# Patient Record
Sex: FEMALE | Race: WHITE | NOT HISPANIC OR LATINO | Employment: STUDENT | URBAN - METROPOLITAN AREA
[De-identification: names, ages, dates, MRNs, and addresses within clinical notes are randomized per-mention and may not be internally consistent; named-entity substitution may affect disease eponyms.]

---

## 2017-01-30 ENCOUNTER — TELEPHONE (OUTPATIENT)
Dept: FAMILY MEDICINE | Facility: CLINIC | Age: 22
End: 2017-01-30

## 2017-01-30 NOTE — TELEPHONE ENCOUNTER
----- Message from Ngozi Freeman sent at 1/30/2017 11:29 AM CST -----  Contact: [phone]/ Jessica   Patient would like to be scheduled as a knew patient since her has passed the age that her pediatrician treats. Patient is on ADD medication and would like to know if you prescribe that medication. Please advise.

## 2017-01-30 NOTE — TELEPHONE ENCOUNTER
Called pt left message that Naila does prescribe ADHD medication but she would need your records from the physician that diagnose you with ADHD so we need a release from sign to get the records.

## 2017-01-30 NOTE — TELEPHONE ENCOUNTER
----- Message from Jeny Mcdaniels sent at 1/30/2017  1:15 PM CST -----  Contact: [phone]/ self   Pt would like to know if Sarah prescribes ADHD medication . Please advise

## 2017-01-31 NOTE — TELEPHONE ENCOUNTER
Called pt concerning her ADD medication ask pt to call office need a release form sign to get pt records on her ADHD from pass physician, and inform her that Naila does prescribe ADD medication.

## 2017-02-13 ENCOUNTER — TELEPHONE (OUTPATIENT)
Dept: FAMILY MEDICINE | Facility: CLINIC | Age: 22
End: 2017-02-13

## 2017-02-13 NOTE — TELEPHONE ENCOUNTER
Call patient and get records from Dr. Harris on ADHD and immunization records first and then advise patient we will call her to set up appointment once records reviewed.  Keep this call open so you remember to keep checking for the record.

## 2017-02-13 NOTE — TELEPHONE ENCOUNTER
----- Message from Sarika Garcia sent at 2/10/2017 12:43 PM CST -----  Contact: [phone]  Patient came in wanting an appointment to see Naila. Patient stated that she would be a New Patient for you. She would like to come in and be treated for her  ADHD, and to get a med refill on her medication for it  as well. Please call # [phone]. Thanks.

## 2017-02-15 NOTE — TELEPHONE ENCOUNTER
Call Dr. Harris's office and inform them that we are waiting on her record before I can schedule her appointment - could they please send ASAP

## 2017-02-16 NOTE — TELEPHONE ENCOUNTER
Called and was told should receive about Monday have not receive in there system yet.had to call   [phone].

## 2017-02-17 NOTE — TELEPHONE ENCOUNTER
They did not send me the physician progress notes to show evaluation and what medications have been prescribed - please resent request with the correct records.

## 2017-02-21 NOTE — TELEPHONE ENCOUNTER
Spoke with patient on phone.  She has been on Adderall, Adderall XR, Ritalin, Vyvanse and now Adzenys XR.  She reports that the Vyvanse worked best in past but felt that it was no longer effective so that was why they had changed medication to Adzenys XR but that one is not effective.    Advised patient that since her ADHD is uncontrolled despite multiple medication trials, I would recommend that she she a psych. Specialist.  Advised patient that there is a psych. NP at [facility] in [place].  States she will call there to set up appointment.

## 2017-02-22 ENCOUNTER — TELEPHONE (OUTPATIENT)
Dept: FAMILY MEDICINE | Facility: CLINIC | Age: 22
End: 2017-02-22

## 2017-02-22 NOTE — TELEPHONE ENCOUNTER
----- Message from Bruna Mora sent at 2/22/2017 11:13 AM CST -----  Patient's mother, Jessica, called.  No. [phone]   Have you received the records from Dr. Gomez.   Please call.

## 2017-02-22 NOTE — TELEPHONE ENCOUNTER
No answer.  Left message on voicemail that I have already spoken with the patient about her ADHD and records and gave recommendations.  Advised to call back with questions.

## 2017-02-23 ENCOUNTER — TELEPHONE (OUTPATIENT)
Dept: FAMILY MEDICINE | Facility: CLINIC | Age: 22
End: 2017-02-23

## 2017-02-23 NOTE — TELEPHONE ENCOUNTER
Spoke with pt mother want to know what doctor Naila referred her to. MA told pt  [facility].

## 2017-02-23 NOTE — TELEPHONE ENCOUNTER
----- Message from Ofelia Mccormick sent at 2/23/2017  8:08 AM CST -----  Contact: [phone]  Please call back with the name of doctor you were referring for my daugther/Please advise.

## 2017-03-08 ENCOUNTER — TELEPHONE (OUTPATIENT)
Dept: FAMILY MEDICINE | Facility: CLINIC | Age: 22
End: 2017-03-08

## 2017-03-08 NOTE — TELEPHONE ENCOUNTER
----- Message from Regina Zhu sent at 3/8/2017  3:50 PM CST -----  Contact: self/[phone]  She needs an appointment on a Friday for a physical, she will be a new patient.

## 2017-03-08 NOTE — TELEPHONE ENCOUNTER
Spoke with pt said that she spoke with Naila about staying on Vyvance, asked pt did she see NP at [facility] pt said no she wants to stay on Vyvance.pt wants to schedule a physical for Friday as a new pt.

## 2017-03-08 NOTE — TELEPHONE ENCOUNTER
May schedule as new patient - physical.  Advise patient that I am willing to put back on Vyvanse but if we continue to have problems controlling ADHD or if there are other underlying psych conditions such as anxiety - will refer to psych.

## 2017-03-10 ENCOUNTER — OFFICE VISIT (OUTPATIENT)
Dept: FAMILY MEDICINE | Facility: CLINIC | Age: 22
End: 2017-03-10
Payer: COMMERCIAL

## 2017-03-10 VITALS
TEMPERATURE: 98 F | HEIGHT: 65 IN | OXYGEN SATURATION: 99 % | WEIGHT: 133.63 LBS | SYSTOLIC BLOOD PRESSURE: 116 MMHG | HEART RATE: 75 BPM | BODY MASS INDEX: 22.26 KG/M2 | DIASTOLIC BLOOD PRESSURE: 70 MMHG

## 2017-03-10 DIAGNOSIS — L30.9 ECZEMA, UNSPECIFIED TYPE: ICD-10-CM

## 2017-03-10 DIAGNOSIS — L56.4 POLYMORPHOUS LIGHT ERUPTION: Primary | ICD-10-CM

## 2017-03-10 DIAGNOSIS — F90.2 ATTENTION DEFICIT HYPERACTIVITY DISORDER (ADHD), COMBINED TYPE: ICD-10-CM

## 2017-03-10 LAB
AMPHET+METHAMPHET UR QL: NORMAL
BARBITURATES UR QL SCN>200 NG/ML: NEGATIVE
BENZODIAZ UR QL SCN>200 NG/ML: NEGATIVE
BZE UR QL SCN: NEGATIVE
CANNABINOIDS UR QL SCN: NEGATIVE
CREAT UR-MCNC: 71 MG/DL
ETHANOL UR-MCNC: <10 MG/DL
METHADONE UR QL SCN>300 NG/ML: NEGATIVE
OPIATES UR QL SCN: NEGATIVE
PCP UR QL SCN>25 NG/ML: NEGATIVE
TOXICOLOGY INFORMATION: NORMAL

## 2017-03-10 PROCEDURE — 1160F RVW MEDS BY RX/DR IN RCRD: CPT | Mod: S$GLB,,, | Performed by: NURSE PRACTITIONER

## 2017-03-10 PROCEDURE — 80307 DRUG TEST PRSMV CHEM ANLYZR: CPT

## 2017-03-10 PROCEDURE — 99999 PR PBB SHADOW E&M-EST. PATIENT-LVL IV: CPT | Mod: PBBFAC,,, | Performed by: NURSE PRACTITIONER

## 2017-03-10 PROCEDURE — 99203 OFFICE O/P NEW LOW 30 MIN: CPT | Mod: S$GLB,,, | Performed by: NURSE PRACTITIONER

## 2017-03-10 PROCEDURE — 82570 ASSAY OF URINE CREATININE: CPT

## 2017-03-10 RX ORDER — LISDEXAMFETAMINE DIMESYLATE 50 MG/1
50 CAPSULE ORAL EVERY MORNING
Qty: 30 CAPSULE | Refills: 0 | Status: SHIPPED | OUTPATIENT
Start: 2017-03-10 | End: 2017-04-19 | Stop reason: SDUPTHER

## 2017-03-10 RX ORDER — PIMECROLIMUS 10 MG/G
CREAM TOPICAL
COMMUNITY
Start: 2017-01-23 | End: 2017-10-04 | Stop reason: ALTCHOICE

## 2017-03-10 RX ORDER — NAPROXEN 375 MG/1
375 TABLET ORAL
COMMUNITY
Start: 2016-08-31 | End: 2018-04-16

## 2017-03-10 RX ORDER — TRIAMCINOLONE ACETONIDE 1 MG/G
CREAM TOPICAL
COMMUNITY
Start: 2016-06-10 | End: 2017-10-04 | Stop reason: ALTCHOICE

## 2017-03-10 NOTE — MR AVS SNAPSHOT
[facility]  [address] [place] [address]  Phone: [phone]  Fax: [phone]                  Elisha Grimes   3/10/2017 2:00 PM   Office Visit    Description:  Female : 1995   Provider:  Naila Smith NP   Department:  [facility] [place]           Reason for Visit     Annual Exam     Medication Refill           Diagnoses this Visit        Comments    Polymorphous light eruption    -  Primary     Attention deficit hyperactivity disorder (ADHD), combined type         Eczema, unspecified type                To Do List           Goals (5 Years of Data)     None      Follow-Up and Disposition     Return in about 4 weeks (around 2017) for if doing well, wellness exam.  If complaint, schedule follow up.       These Medications        Disp Refills Start End    lisdexamfetamine (VYVANSE) 50 MG capsule 30 capsule 0 3/10/2017     Take 1 capsule (50 mg total) by mouth every morning. - Oral    Pharmacy: [facility] [place] - [address] Ph #: [phone]         [facility]     [facility] Nurse Care Line -  Assistance  Registered nurses in the [facility] Call Center provide clinical advisement, health education, appointment booking, and other advisory services.  Call for this free service at [phone].             Medications           Message regarding Medications     Verify the changes and/or additions to your medication regime listed below are the same as discussed with your clinician today.  If any of these changes or additions are incorrect, please notify your healthcare provider.        START taking these NEW medications        Refills    lisdexamfetamine (VYVANSE) 50 MG capsule 0    Sig: Take 1 capsule (50 mg total) by mouth every morning.    Class: Print    Route: Oral      STOP taking these medications     ranitidine (ZANTAC) 150 MG tablet Take 150 mg by mouth 2 (two) times daily.     "norethindrone-ethinyl estradiol (JUNEL FE 1/20) 1-20 mg-mcg per tablet Take 1 tablet by mouth once daily.           Verify that the below list of medications is an accurate representation of the medications you are currently taking.  If none reported, the list may be blank. If incorrect, please contact your healthcare provider. Carry this list with you in case of emergency.           Current Medications     naproxen (NAPROSYN) 375 MG tablet Take 375 mg by mouth.    norethindrone-e.estradiol-iron (LO LOESTRIN FE) 1 mg-10 mcg (24)/10 mcg (2) Tab TAKE 1 TABLET BY MOUTH DAILY    pimecrolimus (ELIDEL) 1 % cream Apply topically 2 (two) times daily.    triamcinolone acetonide 0.1% (KENALOG) 0.1 % cream Apply to areas of eczema on body twice a day    lisdexamfetamine (VYVANSE) 50 MG capsule Take 1 capsule (50 mg total) by mouth every morning.           Clinical Reference Information           Your Vitals Were     BP Pulse Temp Height Weight SpO2    116/70 (BP Location: Right arm, Patient Position: Sitting, BP Method: Manual) 75 98.2 °F (36.8 °C) (Oral) 5' 5" (1.651 m) 60.6 kg (133 lb 9.6 oz) 99%    BMI                22.23 kg/m2          Blood Pressure          Most Recent Value    BP  116/70      Allergies as of 3/10/2017     No Known Allergies      Immunizations Administered on Date of Encounter - 3/10/2017     None      Orders Placed During Today's Visit      Normal Orders This Visit    Toxicology screen, urine       [facility] Sign-Up     Activating your [facility] account is as easy as 1-2-3!     1) Visit [email or web address], select Sign Up Now, enter this activation code and your date of birth, then select Next.  [email or web address]  Expires: 4/24/2017  3:06 PM      2) Create a username and password to use when you visit [facility] in the future and select a security question in case you lose your password and select Next.    3) Enter your e-mail address and click Sign Up!    Additional Information  If you have questions, " please e-mail [email or web address] or call [phone] to talk to our [facility] staff. Remember, [facility] is NOT to be used for urgent needs. For medical emergencies, dial 911.         Language Assistance Services     ATTENTION: Language assistance services are available, free of charge. Please call [phone].      ATENCIÓN: Si habla español, tiene a david disposición servicios gratuitos de asistencia lingüística. Llame al [phone].     [facility] Ý: N?u b?n nói Ti?ng Vi?t, có các d?ch v? h? tr? ngôn ng? mi?n phí dành cho b?n. G?i s? [phone].         [facility] complies with applicable Federal civil rights laws and does not discriminate on the basis of race, color, national origin, age, disability, or sex.

## 2017-03-10 NOTE — PROGRESS NOTES
Subjective:       Patient ID: Elisha Grimes is a 21 y.o. female.    Chief Complaint: Annual Exam and Medication Refill    HPI Comments: ####NEW PATIENT###    Patient is here today to establish care with new PCP.    Patient has ADHD and has been tried on several different ADHD medications by her pediatrician.  She states that the Vyvanse seems to work the best with the least amount of side effects.  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    Patient also complains of a rash to bilateral arm and the v of her chest - has a sunburn that turned into rash/red bumps.      Previous Medications    NAPROXEN (NAPROSYN) 375 MG TABLET    Take 375 mg by mouth.    NORETHINDRONE-E.ESTRADIOL-IRON (LO LOESTRIN FE) 1 MG-10 MCG (24)/10 MCG (2) TAB    TAKE 1 TABLET BY MOUTH DAILY    PIMECROLIMUS (ELIDEL) 1 % CREAM    Apply topically 2 (two) times daily.    TRIAMCINOLONE ACETONIDE 0.1% (KENALOG) 0.1 % CREAM    Apply to areas of eczema on body twice a day       Past Medical History:   Diagnosis Date    Attention deficit hyperactivity disorder (ADHD), combined type     Eczema        History reviewed. No pertinent surgical history.    Family History   Problem Relation Age of Onset    No Known Problems Mother     Heart disease Father      passed age 63 Acute MI; had prior cardiac arrest events prior -     Hypertension Father     Hyperlipidemia Father     No Known Problems Brother     Emphysema Maternal Grandmother        Social History     Social History    Marital status: Single     Spouse name: N/A    Number of children: N/A    Years of education: N/A     Occupational History    student      Social History Main Topics    Smoking status: Never Smoker    Smokeless tobacco: None    Alcohol use Yes      Comment: once monthly on average; vodka and water usually 2 drinks on a night she drinks    Drug use: No    Sexual activity: Yes      "Partners: Male     Birth control/ protection: OCP     Other Topics Concern    None     Social History Narrative    Going to [employer] - with plans for PA school.       Review of Systems   Constitutional: Negative for appetite change, chills, fatigue, fever and unexpected weight change.   HENT: Negative for congestion, ear pain, mouth sores, nosebleeds, postnasal drip, rhinorrhea, sinus pressure, sneezing, sore throat, trouble swallowing and voice change.    Eyes: Negative for photophobia, pain, discharge, redness, itching and visual disturbance.   Respiratory: Negative for cough, chest tightness and shortness of breath.    Cardiovascular: Negative for chest pain, palpitations and leg swelling.   Gastrointestinal: Negative for abdominal pain, blood in stool, constipation, diarrhea, nausea and vomiting.   Genitourinary: Negative for dysuria, frequency, hematuria and urgency.   Musculoskeletal: Negative for arthralgias, back pain, joint swelling and myalgias.   Skin: Positive for rash. Negative for color change.   Allergic/Immunologic: Negative for immunocompromised state.   Neurological: Negative for dizziness, seizures, syncope, weakness and headaches.   Hematological: Negative for adenopathy. Does not bruise/bleed easily.   Psychiatric/Behavioral: Negative for agitation, dysphoric mood, sleep disturbance and suicidal ideas. The patient is not nervous/anxious.          Objective:     Vitals:    03/10/17 1402   BP: 116/70   BP Location: Right arm   Patient Position: Sitting   BP Method: Manual   Pulse: 75   Temp: 98.2 °F (36.8 °C)   TempSrc: Oral   SpO2: 99%   Weight: 60.6 kg (133 lb 9.6 oz)   Height: 5' 5" (1.651 m)          Physical Exam   Constitutional: She is oriented to person, place, and time. She appears well-developed and well-nourished.   HENT:   Head: Normocephalic and atraumatic.   Right Ear: External ear normal.   Left Ear: External ear normal.   Nose: Nose normal.   Mouth/Throat: " Oropharynx is clear and moist. No oropharyngeal exudate.   Eyes: EOM are normal. Pupils are equal, round, and reactive to light.   Neck: Normal range of motion. Neck supple. No tracheal deviation present. No thyromegaly present.   Cardiovascular: Normal rate, regular rhythm and normal heart sounds.    No murmur heard.  Pulmonary/Chest: Effort normal and breath sounds normal. No respiratory distress.   Abdominal: Soft. She exhibits no distension.   Musculoskeletal: Normal range of motion. She exhibits no edema.   Lymphadenopathy:     She has no cervical adenopathy.   Neurological: She is alert and oriented to person, place, and time. No cranial nerve deficit. Coordination normal.   Skin: Skin is warm and dry. Rash noted. Rash is papular.        Psychiatric: She has a normal mood and affect.         Assessment:         ICD-10-CM ICD-9-CM   1. Attention deficit hyperactivity disorder (ADHD), combined type F90.2 314.01   2. Eczema, unspecified type L30.9 692.9       Plan:       Polymorphous light eruption  -  Patient is already prescribed triamcinolone for eczema - advised to apply to area.  Advised on using sunscreens, etc.    Attention deficit hyperactivity disorder (ADHD), combined type  -  Start back on Vyvanse 50 mg daily and return in 4 weeks for follow up.  -     lisdexamfetamine (VYVANSE) 50 MG capsule; Take 1 capsule (50 mg total) by mouth every morning.  Dispense: 30 capsule; Refill: 0  -     Toxicology screen, urine    Eczema, unspecified type      Return in about 4 weeks (around 4/7/2017) for if doing well, wellness exam.  If complaint, schedule follow up.     Patient's Medications   New Prescriptions    LISDEXAMFETAMINE (VYVANSE) 50 MG CAPSULE    Take 1 capsule (50 mg total) by mouth every morning.   Previous Medications    NAPROXEN (NAPROSYN) 375 MG TABLET    Take 375 mg by mouth.    NORETHINDRONE-E.ESTRADIOL-IRON (LO LOESTRIN FE) 1 MG-10 MCG (24)/10 MCG (2) TAB    TAKE 1 TABLET BY MOUTH DAILY     PIMECROLIMUS (ELIDEL) 1 % CREAM    Apply topically 2 (two) times daily.    TRIAMCINOLONE ACETONIDE 0.1% (KENALOG) 0.1 % CREAM    Apply to areas of eczema on body twice a day   Modified Medications    No medications on file   Discontinued Medications    NAPROXEN (NAPROSYN) 500 MG TABLET    Take 500 mg by mouth 2 (two) times daily.    NORETHINDRONE-ETHINYL ESTRADIOL (JUNEL FE 1/20) 1-20 MG-MCG PER TABLET    Take 1 tablet by mouth once daily.    RANITIDINE (ZANTAC) 150 MG TABLET    Take 150 mg by mouth 2 (two) times daily.

## 2017-03-12 DIAGNOSIS — Z13.220 SCREENING CHOLESTEROL LEVEL: ICD-10-CM

## 2017-03-12 DIAGNOSIS — Z13.1 DIABETES MELLITUS SCREENING: ICD-10-CM

## 2017-03-12 DIAGNOSIS — Z13.0 SCREENING, ANEMIA, DEFICIENCY, IRON: Primary | ICD-10-CM

## 2017-03-12 DIAGNOSIS — Z13.29 THYROID DISORDER SCREEN: ICD-10-CM

## 2017-04-19 ENCOUNTER — OFFICE VISIT (OUTPATIENT)
Dept: FAMILY MEDICINE | Facility: CLINIC | Age: 22
End: 2017-04-19
Payer: COMMERCIAL

## 2017-04-19 VITALS
OXYGEN SATURATION: 99 % | BODY MASS INDEX: 22.92 KG/M2 | HEART RATE: 73 BPM | DIASTOLIC BLOOD PRESSURE: 60 MMHG | WEIGHT: 137.56 LBS | TEMPERATURE: 98 F | HEIGHT: 65 IN | SYSTOLIC BLOOD PRESSURE: 110 MMHG

## 2017-04-19 DIAGNOSIS — F90.2 ATTENTION DEFICIT HYPERACTIVITY DISORDER (ADHD), COMBINED TYPE: Primary | ICD-10-CM

## 2017-04-19 PROCEDURE — 1160F RVW MEDS BY RX/DR IN RCRD: CPT | Mod: S$GLB,,, | Performed by: NURSE PRACTITIONER

## 2017-04-19 PROCEDURE — 99999 PR PBB SHADOW E&M-EST. PATIENT-LVL III: CPT | Mod: PBBFAC,,, | Performed by: NURSE PRACTITIONER

## 2017-04-19 PROCEDURE — 99213 OFFICE O/P EST LOW 20 MIN: CPT | Mod: S$GLB,,, | Performed by: NURSE PRACTITIONER

## 2017-04-19 RX ORDER — LISDEXAMFETAMINE DIMESYLATE 50 MG/1
50 CAPSULE ORAL EVERY MORNING
Qty: 30 CAPSULE | Refills: 0 | Status: SHIPPED | OUTPATIENT
Start: 2017-04-19 | End: 2017-06-23 | Stop reason: SDUPTHER

## 2017-04-19 RX ORDER — LISDEXAMFETAMINE DIMESYLATE 50 MG/1
50 CAPSULE ORAL EVERY MORNING
Qty: 30 CAPSULE | Refills: 0 | Status: SHIPPED | OUTPATIENT
Start: 2017-05-19 | End: 2017-10-04 | Stop reason: SDUPTHER

## 2017-04-19 NOTE — MR AVS SNAPSHOT
[facility]  [address]  [place] [address]  Phone: [phone]  Fax: [phone]                  Elisha Grimes   2017 8:00 AM   Office Visit    Description:  Female : 1995   Provider:  Naila Smith NP   Department:  [facility]           Reason for Visit     Medication Refill           Diagnoses this Visit        Comments    Attention deficit hyperactivity disorder (ADHD), combined type    -  Primary            To Do List           Goals (5 Years of Data)     None      Follow-Up and Disposition     Return in about 2 months (around 2017).    Follow-up and Disposition History       These Medications        Disp Refills Start End    lisdexamfetamine (VYVANSE) 50 MG capsule 30 capsule 0 2017     Take 1 capsule (50 mg total) by mouth every morning. - Oral    [facility] [facility] [address] Ph #: [phone]       lisdexamfetamine (VYVANSE) 50 MG capsule 30 capsule 0 2017     Take 1 capsule (50 mg total) by mouth every morning. - Oral    Pharmacy: [place] [address] Ph #: [phone]         [facility]     [facility] Care Line -  Assistance  Unless otherwise directed by your provider, please contact [facility], our nurse care line that is available for  assistance.     Registered nurses in the [facility] Center provide: appointment scheduling, clinical advisement, health education, and other advisory services.  Call: [phone] (toll free)               Medications           Message regarding Medications     Verify the changes and/or additions to your medication regime listed below are the same as discussed with your clinician today.  If any of these changes or additions are incorrect, please notify your healthcare provider.        START taking these NEW medications        Refills     "lisdexamfetamine (VYVANSE) 50 MG capsule 0    Starting on: 5/19/2017    Sig: Take 1 capsule (50 mg total) by mouth every morning.    Class: Print    Route: Oral           Verify that the below list of medications is an accurate representation of the medications you are currently taking.  If none reported, the list may be blank. If incorrect, please contact your healthcare provider. Carry this list with you in case of emergency.           Current Medications     lisdexamfetamine (VYVANSE) 50 MG capsule Take 1 capsule (50 mg total) by mouth every morning.    norethindrone-e.estradiol-iron (LO LOESTRIN FE) 1 mg-10 mcg (24)/10 mcg (2) Tab TAKE 1 TABLET BY MOUTH DAILY    pimecrolimus (ELIDEL) 1 % cream Apply topically 2 (two) times daily.    triamcinolone acetonide 0.1% (KENALOG) 0.1 % cream Apply to areas of eczema on body twice a day    lisdexamfetamine (VYVANSE) 50 MG capsule Starting on May 19, 2017. Take 1 capsule (50 mg total) by mouth every morning.    naproxen (NAPROSYN) 375 MG tablet Take 375 mg by mouth.           Clinical Reference Information           Your Vitals Were     BP Pulse Temp Height Weight SpO2    110/60 (BP Location: Right arm, Patient Position: Sitting, BP Method: Automatic) 73 98.2 °F (36.8 °C) (Oral) 5' 5" (1.651 m) 62.4 kg (137 lb 9.1 oz) 99%    BMI                22.89 kg/m2          Blood Pressure          Most Recent Value    BP  110/60      Allergies as of 4/19/2017     No Known Allergies      Immunizations Administered on Date of Encounter - 4/19/2017     None      Language Assistance Services     ATTENTION: Language assistance services are available, free of charge. Please call [phone].      ATENCIÓN: Si selenala teodoro, tiene a david disposición servicios gratuitos de asistencia lingüística. Llame al [phone].     CHÚ Ý: N?u b?n nói Ti?ng Vi?t, có các d?ch v? h? tr? ngôn ng? mi?n phí dành cho b?n. G?i s? [phone].         [facility] complies with applicable " Federal civil rights laws and does not discriminate on the basis of race, color, national origin, age, disability, or sex.

## 2017-04-19 NOTE — PROGRESS NOTES
Subjective:       Patient ID: Elisha Grimes is a 21 y.o. female.    Chief Complaint: Medication Refill    HPI Comments: Patient has ADHD.  Patient was started back on Vyvanse after trial of several different medications by another provider and the Vyvanse is working the best.  No side effects reported.  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          Previous Medications    NAPROXEN (NAPROSYN) 375 MG TABLET    Take 375 mg by mouth.    NORETHINDRONE-E.ESTRADIOL-IRON (LO LOESTRIN FE) 1 MG-10 MCG (24)/10 MCG (2) TAB    TAKE 1 TABLET BY MOUTH DAILY    PIMECROLIMUS (ELIDEL) 1 % CREAM    Apply topically 2 (two) times daily.    TRIAMCINOLONE ACETONIDE 0.1% (KENALOG) 0.1 % CREAM    Apply to areas of eczema on body twice a day       Past Medical History:   Diagnosis Date    Attention deficit hyperactivity disorder (ADHD), combined type     Eczema        History reviewed. No pertinent surgical history.    Family History   Problem Relation Age of Onset    No Known Problems Mother     Heart disease Father      passed age 63 Acute MI; had prior cardiac arrest events prior -     Hypertension Father     Hyperlipidemia Father     No Known Problems Brother     Emphysema Maternal Grandmother        Social History     Social History    Marital status: Single     Spouse name: N/A    Number of children: N/A    Years of education: N/A     Occupational History    student      Social History Main Topics    Smoking status: Never Smoker    Smokeless tobacco: None    Alcohol use Yes      Comment: once monthly on average; vodka and water usually 2 drinks on a night she drinks    Drug use: No    Sexual activity: Yes     Partners: Male     Birth control/ protection: OCP     Other Topics Concern    None     Social History Narrative    Going to [employer] [place] - with plans for PA school.       Review of Systems  "  Constitutional: Negative for appetite change, chills, fatigue, fever and unexpected weight change.   HENT: Negative for congestion, ear pain, mouth sores, nosebleeds, postnasal drip, rhinorrhea, sinus pressure, sneezing, sore throat, trouble swallowing and voice change.    Eyes: Negative for photophobia, pain, discharge, redness, itching and visual disturbance.   Respiratory: Negative for cough, chest tightness and shortness of breath.    Cardiovascular: Negative for chest pain, palpitations and leg swelling.   Gastrointestinal: Negative for abdominal pain, blood in stool, constipation, diarrhea, nausea and vomiting.   Genitourinary: Negative for dysuria, frequency, hematuria and urgency.   Musculoskeletal: Negative for arthralgias, back pain, joint swelling and myalgias.   Skin: Negative for color change and rash.   Allergic/Immunologic: Negative for immunocompromised state.   Neurological: Negative for dizziness, seizures, syncope, weakness and headaches.   Hematological: Negative for adenopathy. Does not bruise/bleed easily.   Psychiatric/Behavioral: Negative for agitation, dysphoric mood, sleep disturbance and suicidal ideas. The patient is not nervous/anxious.          Objective:     Vitals:    04/19/17 0812   BP: 110/60   BP Location: Right arm   Patient Position: Sitting   BP Method: Automatic   Pulse: 73   Temp: 98.2 °F (36.8 °C)   TempSrc: Oral   SpO2: 99%   Weight: 62.4 kg (137 lb 9.1 oz)   Height: 5' 5" (1.651 m)          Physical Exam   Constitutional: She is oriented to person, place, and time. She appears well-developed and well-nourished.   HENT:   Head: Normocephalic and atraumatic.   Right Ear: External ear normal.   Left Ear: External ear normal.   Nose: Nose normal.   Mouth/Throat: Oropharynx is clear and moist. No oropharyngeal exudate.   Eyes: EOM are normal. Pupils are equal, round, and reactive to light.   Neck: Normal range of motion. Neck supple. No tracheal deviation present. No " thyromegaly present.   Cardiovascular: Normal rate, regular rhythm and normal heart sounds.    No murmur heard.  Pulmonary/Chest: Effort normal and breath sounds normal. No respiratory distress.   Abdominal: Soft. She exhibits no distension.   Musculoskeletal: Normal range of motion. She exhibits no edema.   Lymphadenopathy:     She has no cervical adenopathy.   Neurological: She is alert and oriented to person, place, and time. No cranial nerve deficit. Coordination normal.   Skin: Skin is warm and dry.   Psychiatric: She has a normal mood and affect.         Assessment:         ICD-10-CM ICD-9-CM   1. Attention deficit hyperactivity disorder (ADHD), combined type F90.2 314.01       Plan:       Attention deficit hyperactivity disorder (ADHD), combined type  -  Controlled on present medication.  Follow up in 2 months.  -     lisdexamfetamine (VYVANSE) 50 MG capsule; Take 1 capsule (50 mg total) by mouth every morning.  Dispense: 30 capsule; Refill: 0  -     lisdexamfetamine (VYVANSE) 50 MG capsule; Take 1 capsule (50 mg total) by mouth every morning.  Dispense: 30 capsule; Refill: 0      Return in about 2 months (around 6/19/2017).     Patient's Medications   New Prescriptions    LISDEXAMFETAMINE (VYVANSE) 50 MG CAPSULE    Take 1 capsule (50 mg total) by mouth every morning.   Previous Medications    NAPROXEN (NAPROSYN) 375 MG TABLET    Take 375 mg by mouth.    NORETHINDRONE-E.ESTRADIOL-IRON (LO LOESTRIN FE) 1 MG-10 MCG (24)/10 MCG (2) TAB    TAKE 1 TABLET BY MOUTH DAILY    PIMECROLIMUS (ELIDEL) 1 % CREAM    Apply topically 2 (two) times daily.    TRIAMCINOLONE ACETONIDE 0.1% (KENALOG) 0.1 % CREAM    Apply to areas of eczema on body twice a day   Modified Medications    Modified Medication Previous Medication    LISDEXAMFETAMINE (VYVANSE) 50 MG CAPSULE lisdexamfetamine (VYVANSE) 50 MG capsule       Take 1 capsule (50 mg total) by mouth every morning.    Take 1 capsule (50 mg total) by mouth every morning.    Discontinued Medications    No medications on file

## 2017-05-16 DIAGNOSIS — F90.2 ATTENTION DEFICIT HYPERACTIVITY DISORDER (ADHD), COMBINED TYPE: ICD-10-CM

## 2017-05-16 RX ORDER — LISDEXAMFETAMINE DIMESYLATE 50 MG/1
50 CAPSULE ORAL EVERY MORNING
Qty: 30 CAPSULE | Refills: 0 | OUTPATIENT
Start: 2017-05-16

## 2017-05-16 RX ORDER — LISDEXAMFETAMINE DIMESYLATE 50 MG/1
50 CAPSULE ORAL EVERY MORNING
Qty: 30 CAPSULE | Refills: 0 | OUTPATIENT
Start: 2017-05-19

## 2017-05-16 NOTE — TELEPHONE ENCOUNTER
Advise patient when I seen her at office, I gave her a prescription For April 2017 and also gave her a prescription for May 19th, 2017 - she should already have her prescription for this month and then we follow up in June.

## 2017-05-16 NOTE — TELEPHONE ENCOUNTER
----- Message from Rabia Ravi sent at 5/16/2017 12:21 PM CDT -----  Contact: [phone]  [facility]     Patient is requesting an  Order for vyvance

## 2017-05-17 RX ORDER — LISDEXAMFETAMINE DIMESYLATE 50 MG/1
50 CAPSULE ORAL EVERY MORNING
Qty: 30 CAPSULE | Refills: 0 | OUTPATIENT
Start: 2017-05-17

## 2017-05-17 NOTE — TELEPHONE ENCOUNTER
Spoke with pt mom and said the pt going out of town jese the prescription has the 19 th on it and the pharmacy said if physician would send an E-script thay would fill early but pt does have prescription for may just wanted it filled early, spoke with ZULEYKA Yoo and inform her of this matter and said this is a control substance can not be filled early or sent E scribe pt would have to fill when back in town.

## 2017-05-17 NOTE — TELEPHONE ENCOUNTER
----- Message from Bruna Mora sent at 5/17/2017  9:34 AM CDT -----  No. [phone]   Patient is going out of town today.   Vyvanse is not at the pharmacy yet.   [facility]

## 2017-05-17 NOTE — TELEPHONE ENCOUNTER
Advise patient that she was given two  prescriptions at her last visit - one was dated for April and one was dated for May - she was handed both prescriptions at office visit -  she has the prescription.

## 2017-06-23 ENCOUNTER — OFFICE VISIT (OUTPATIENT)
Dept: FAMILY MEDICINE | Facility: CLINIC | Age: 22
End: 2017-06-23
Payer: COMMERCIAL

## 2017-06-23 VITALS
HEART RATE: 88 BPM | HEIGHT: 65 IN | TEMPERATURE: 99 F | DIASTOLIC BLOOD PRESSURE: 70 MMHG | SYSTOLIC BLOOD PRESSURE: 108 MMHG | BODY MASS INDEX: 22.3 KG/M2 | WEIGHT: 133.81 LBS | OXYGEN SATURATION: 98 %

## 2017-06-23 DIAGNOSIS — F90.2 ATTENTION DEFICIT HYPERACTIVITY DISORDER (ADHD), COMBINED TYPE: Primary | ICD-10-CM

## 2017-06-23 DIAGNOSIS — L60.0 INGROWN TOENAIL: ICD-10-CM

## 2017-06-23 PROCEDURE — 99213 OFFICE O/P EST LOW 20 MIN: CPT | Mod: S$GLB,,, | Performed by: NURSE PRACTITIONER

## 2017-06-23 PROCEDURE — 99999 PR PBB SHADOW E&M-EST. PATIENT-LVL V: CPT | Mod: PBBFAC,,, | Performed by: NURSE PRACTITIONER

## 2017-06-23 RX ORDER — LISDEXAMFETAMINE DIMESYLATE 50 MG/1
50 CAPSULE ORAL EVERY MORNING
Qty: 30 CAPSULE | Refills: 0 | Status: SHIPPED | OUTPATIENT
Start: 2017-07-21 | End: 2017-10-04 | Stop reason: SDUPTHER

## 2017-06-23 RX ORDER — LISDEXAMFETAMINE DIMESYLATE 50 MG/1
50 CAPSULE ORAL EVERY MORNING
Qty: 30 CAPSULE | Refills: 0 | Status: SHIPPED | OUTPATIENT
Start: 2017-06-23 | End: 2017-06-23 | Stop reason: SDUPTHER

## 2017-06-23 RX ORDER — LISDEXAMFETAMINE DIMESYLATE 50 MG/1
50 CAPSULE ORAL EVERY MORNING
Qty: 30 CAPSULE | Refills: 0 | Status: SHIPPED | OUTPATIENT
Start: 2017-08-21 | End: 2017-10-04 | Stop reason: SDUPTHER

## 2017-06-23 NOTE — PATIENT INSTRUCTIONS
Understanding Ingrown Toenails    An ingrown nail is the result of a nail growing into the skin that surrounds it. This often occurs at either edge of the big toe. Ingrown nails may be caused by improper trimming, inherited nail deformities, injuries, fungal infections, or pressure.  Symptoms  Ingrown nails may cause pain at the tip of the toe or all the way to the base of the toe. The pain is often worse while walking. An ingrown nail may also lead to infection, inflammation, or a more serious condition. If its infected, you might see pus or redness.  Evaluation  To determine the extent of your problem, your healthcare provider examines and possibly presses the painful area. If other problems are suspected, blood tests, cultures, and X-rays may be done as well.  Treatment  If the nail isnt infected, your healthcare provider may trim the corner of it to help relieve your symptoms. He or she may need to remove one side of your nail back to the cuticle. The base of the nail may then be treated with a chemical to keep the ingrown part from growing back. Severe infections or ingrown nails may require antibiotics and temporary or permanent removal of a portion of the nail. To prevent pain, a local anesthetic may be used in these procedures. This treatment is usually done at your healthcare provider's office.  Prevention  Many nail problems can be prevented by wearing the right shoes and trimming your nails properly. To help avoid infection, keep your feet clean and dry. If you have diabetes, talk with your healthcare provider before doing any foot self-care.  · The right shoes: Get your feet measured (your size may change as you age). Wear shoes that are supportive and roomy enough for your toes to wiggle. Look for shoes made of natural materials such as leather, which allow your feet to breathe.  · Proper trimming: To avoid problems, trim your toenails straight across without cutting down into the corners. If you  cant trim your own nails, ask your healthcare provider to do so for you.  Date Last Reviewed: 10/1/2016  © 3091-4984 [employer]. [address] [place] [address], [place] [address]. All rights reserved. This information is not intended as a substitute for professional medical care. Always follow your healthcare professional's instructions.        Ingrown Toenail, Not Infected (Home Treatment)  An ingrown toenail occurs when the nail grows sideways into the skin next to the nail. This can cause pain, especially when wearing tight shoes. It can also lead to an infection with redness, swelling, and pus drainage. Most people respond to the treatments described here. Sometimes surgery is needed, however.  Home care  The following guidelines will help you care for your toenail at home:  · Soak the painful toe in warm water twice a day for 10 to 20 minutes each time. Wash the entire foot with an antibacterial soap.  · If there is redness or swelling around the toenail, apply an antibiotic ointment three times a day.  · Insert a small piece of rolled-up cotton under the corner of the nail to promote growth of the nail outward, away from the cuticle.  · Wear shoes that do not put pressure on the toes, such as a sandal or open shoe. Closed shoes should be big enough in the toes so that there is no pressure on the painful toe.  · You may use acetaminophen or ibuprofen for pain, unless another pain medicine was prescribed. Talk with your healthcare provider before using these medicines if you have chronic liver or kidney disease. Also tell your healthcare provider if you have ever had a stomach ulcer or GI bleeding.  Prevention  The following tips will help you prevent ingrown toenails:  · Avoid pointed, tight, or narrow shoes.  · Trim toenails once a month so they dont grow too long. Cut the nail straight across.  Follow-up care  Follow up with your healthcare provider or as advised.  When to seek medical advice  Call  your health care provider right away if any of these occur:  · Increasing redness, pain, or swelling of the toe  · Tender red streaks in the skin leading toward the ankle  · Pus or fluid drainage from the toe  · Fever of 100.4°F (38°C) or higher  Date Last Reviewed: 5/14/2015  © 9211-5813 [employer]. [address] [place] [address]. All rights reserved. This information is not intended as a substitute for professional medical care. Always follow your healthcare professional's instructions.

## 2017-06-23 NOTE — PROGRESS NOTES
Subjective:       Patient ID: Elisha Grimes is a 21 y.o. female.    Chief Complaint: No chief complaint on file.    Patient has ADHD.  Patient was started back on Vyvanse after trial of several different medications by another provider and the Vyvanse is working the best.  No side effects reported.  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     Patient has a recurring ingrown toenail to left 2nd toenail with swelling present but no infection or drainage - had removed but recurs - requesting referral to podiatrist.        Previous Medications    LISDEXAMFETAMINE (VYVANSE) 50 MG CAPSULE    Take 1 capsule (50 mg total) by mouth every morning.    LISDEXAMFETAMINE (VYVANSE) 50 MG CAPSULE    Take 1 capsule (50 mg total) by mouth every morning.    NAPROXEN (NAPROSYN) 375 MG TABLET    Take 375 mg by mouth.    NORETHINDRONE-E.ESTRADIOL-IRON (LO LOESTRIN FE) 1 MG-10 MCG (24)/10 MCG (2) TAB    TAKE 1 TABLET BY MOUTH DAILY    PIMECROLIMUS (ELIDEL) 1 % CREAM    Apply topically 2 (two) times daily.    TRIAMCINOLONE ACETONIDE 0.1% (KENALOG) 0.1 % CREAM    Apply to areas of eczema on body twice a day       Past Medical History:   Diagnosis Date    Attention deficit hyperactivity disorder (ADHD), combined type     Eczema        No past surgical history on file.    Family History   Problem Relation Age of Onset    No Known Problems Mother     Heart disease Father      passed age 63 Acute MI; had prior cardiac arrest events prior -     Hypertension Father     Hyperlipidemia Father     No Known Problems Brother     Emphysema Maternal Grandmother        Social History     Social History    Marital status: Single     Spouse name: N/A    Number of children: N/A    Years of education: N/A     Occupational History    student      Social History Main Topics    Smoking status: Never Smoker    Smokeless tobacco: Not on file    Alcohol use  "Yes      Comment: once monthly on average; vodka and water usually 2 drinks on a night she drinks    Drug use: No    Sexual activity: Yes     Partners: Male     Birth control/ protection: OCP     Other Topics Concern    Not on file     Social History Narrative    Going to [employer] - with plans for PA school.       Review of Systems   Constitutional: Negative for appetite change, chills, fatigue, fever and unexpected weight change.   HENT: Negative for congestion, ear pain, mouth sores, nosebleeds, postnasal drip, rhinorrhea, sinus pressure, sneezing, sore throat, trouble swallowing and voice change.    Eyes: Negative for photophobia, pain, discharge, redness, itching and visual disturbance.   Respiratory: Negative for cough, chest tightness and shortness of breath.    Cardiovascular: Negative for chest pain, palpitations and leg swelling.   Gastrointestinal: Negative for abdominal pain, blood in stool, constipation, diarrhea, nausea and vomiting.   Genitourinary: Negative for dysuria, frequency, hematuria and urgency.   Musculoskeletal: Negative for arthralgias, back pain, joint swelling and myalgias.   Skin: Negative for color change and rash.        Ingrown toenail   Allergic/Immunologic: Negative for immunocompromised state.   Neurological: Negative for dizziness, seizures, syncope, weakness and headaches.   Hematological: Negative for adenopathy. Does not bruise/bleed easily.   Psychiatric/Behavioral: Negative for agitation, dysphoric mood, sleep disturbance and suicidal ideas. The patient is not nervous/anxious.          Objective:     Vitals:    06/23/17 1454   BP: 108/70   BP Location: Left arm   Patient Position: Sitting   BP Method: Manual   Pulse: 88   Temp: 98.6 °F (37 °C)   TempSrc: Oral   SpO2: 98%   Weight: 60.7 kg (133 lb 13.1 oz)   Height: 5' 5" (1.651 m)          Physical Exam   Constitutional: She is oriented to person, place, and time. She appears well-developed and " well-nourished.   HENT:   Head: Normocephalic and atraumatic.   Right Ear: External ear normal.   Left Ear: External ear normal.   Nose: Nose normal.   Mouth/Throat: Oropharynx is clear and moist. No oropharyngeal exudate.   Eyes: EOM are normal. Pupils are equal, round, and reactive to light.   Neck: Normal range of motion. Neck supple. No tracheal deviation present. No thyromegaly present.   Cardiovascular: Normal rate, regular rhythm and normal heart sounds.    No murmur heard.  Pulmonary/Chest: Effort normal and breath sounds normal. No respiratory distress.   Abdominal: Soft. She exhibits no distension.   Musculoskeletal: Normal range of motion. She exhibits no edema.        Feet:    + ingrown toenail to left 2nd toenail - no infection   Lymphadenopathy:     She has no cervical adenopathy.   Neurological: She is alert and oriented to person, place, and time. No cranial nerve deficit. Coordination normal.   Skin: Skin is warm and dry.   Psychiatric: She has a normal mood and affect.         Assessment:         ICD-10-CM ICD-9-CM   1. Attention deficit hyperactivity disorder (ADHD), combined type F90.2 314.01   2. Ingrown toenail L60.0 703.0       Plan:       Attention deficit hyperactivity disorder (ADHD), combined type  -  Follow up in 3 months.  -     lisdexamfetamine (VYVANSE) 50 MG capsule; Take 1 capsule (50 mg total) by mouth every morning.  Dispense: 30 capsule; Refill: 0  -     lisdexamfetamine (VYVANSE) 50 MG capsule; Take 1 capsule (50 mg total) by mouth every morning.  Dispense: 30 capsule; Refill: 0  -     lisdexamfetamine (VYVANSE) 50 MG capsule; Take 1 capsule (50 mg total) by mouth every morning.  Dispense: 30 capsule; Refill: 0    Ingrown toenail  -     Ambulatory Referral to Podiatry      Return in about 3 months (around 9/23/2017).     Patient's Medications   New Prescriptions    LISDEXAMFETAMINE (VYVANSE) 50 MG CAPSULE    Take 1 capsule (50 mg total) by mouth every morning.   Previous  Medications    LISDEXAMFETAMINE (VYVANSE) 50 MG CAPSULE    Take 1 capsule (50 mg total) by mouth every morning.    NAPROXEN (NAPROSYN) 375 MG TABLET    Take 375 mg by mouth.    NORETHINDRONE-E.ESTRADIOL-IRON (LO LOESTRIN FE) 1 MG-10 MCG (24)/10 MCG (2) TAB    TAKE 1 TABLET BY MOUTH DAILY    PIMECROLIMUS (ELIDEL) 1 % CREAM    Apply topically 2 (two) times daily.    TRIAMCINOLONE ACETONIDE 0.1% (KENALOG) 0.1 % CREAM    Apply to areas of eczema on body twice a day   Modified Medications    Modified Medication Previous Medication    LISDEXAMFETAMINE (VYVANSE) 50 MG CAPSULE lisdexamfetamine (VYVANSE) 50 MG capsule       Take 1 capsule (50 mg total) by mouth every morning.    Take 1 capsule (50 mg total) by mouth every morning.   Discontinued Medications    No medications on file

## 2017-06-28 ENCOUNTER — HOSPITAL ENCOUNTER (OUTPATIENT)
Dept: RADIOLOGY | Facility: HOSPITAL | Age: 22
Discharge: HOME | End: 2017-06-28
Attending: NURSE PRACTITIONER
Payer: COMMERCIAL

## 2017-06-28 ENCOUNTER — OFFICE VISIT (OUTPATIENT)
Dept: FAMILY MEDICINE | Facility: CLINIC | Age: 22
End: 2017-06-28
Payer: COMMERCIAL

## 2017-06-28 VITALS
SYSTOLIC BLOOD PRESSURE: 130 MMHG | OXYGEN SATURATION: 99 % | WEIGHT: 133.81 LBS | TEMPERATURE: 98 F | BODY MASS INDEX: 22.3 KG/M2 | DIASTOLIC BLOOD PRESSURE: 80 MMHG | HEART RATE: 89 BPM | HEIGHT: 65 IN

## 2017-06-28 DIAGNOSIS — R10.9 ACUTE LEFT FLANK PAIN: ICD-10-CM

## 2017-06-28 DIAGNOSIS — R31.9 BLOOD IN URINE: ICD-10-CM

## 2017-06-28 DIAGNOSIS — R10.9 ACUTE LEFT FLANK PAIN: Primary | ICD-10-CM

## 2017-06-28 DIAGNOSIS — N39.0 URINARY TRACT INFECTION WITH HEMATURIA, SITE UNSPECIFIED: ICD-10-CM

## 2017-06-28 DIAGNOSIS — R30.0 DYSURIA: ICD-10-CM

## 2017-06-28 DIAGNOSIS — R31.9 URINARY TRACT INFECTION WITH HEMATURIA, SITE UNSPECIFIED: ICD-10-CM

## 2017-06-28 LAB
B-HCG UR QL: NEGATIVE
BILIRUB SERPL-MCNC: NEGATIVE MG/DL
BLOOD URINE, POC: ABNORMAL
COLOR, POC UA: ABNORMAL
CTP QC/QA: YES
GLUCOSE UR QL STRIP: NEGATIVE
KETONES UR QL STRIP: NEGATIVE
LEUKOCYTE ESTERASE URINE, POC: ABNORMAL
NITRITE, POC UA: NEGATIVE
PH, POC UA: 6.5
PROTEIN, POC: NEGATIVE
SPECIFIC GRAVITY, POC UA: 1
UROBILINOGEN, POC UA: 0.2

## 2017-06-28 PROCEDURE — 99999 PR PBB SHADOW E&M-EST. PATIENT-LVL V: CPT | Mod: PBBFAC,,, | Performed by: NURSE PRACTITIONER

## 2017-06-28 PROCEDURE — 99213 OFFICE O/P EST LOW 20 MIN: CPT | Mod: 25,S$GLB,, | Performed by: NURSE PRACTITIONER

## 2017-06-28 PROCEDURE — 74176 CT ABD & PELVIS W/O CONTRAST: CPT | Mod: 26,,, | Performed by: RADIOLOGY

## 2017-06-28 PROCEDURE — 74176 CT ABD & PELVIS W/O CONTRAST: CPT | Mod: TC

## 2017-06-28 PROCEDURE — 81001 URINALYSIS AUTO W/SCOPE: CPT | Mod: S$GLB,,, | Performed by: NURSE PRACTITIONER

## 2017-06-28 PROCEDURE — 81025 URINE PREGNANCY TEST: CPT | Mod: QW,S$GLB,, | Performed by: NURSE PRACTITIONER

## 2017-06-28 RX ORDER — KETOROLAC TROMETHAMINE 10 MG/1
10 TABLET, FILM COATED ORAL EVERY 6 HOURS
Qty: 30 TABLET | Refills: 0 | Status: SHIPPED | OUTPATIENT
Start: 2017-06-28 | End: 2017-10-04 | Stop reason: ALTCHOICE

## 2017-06-28 RX ORDER — SULFAMETHOXAZOLE AND TRIMETHOPRIM 800; 160 MG/1; MG/1
1 TABLET ORAL 2 TIMES DAILY
Qty: 20 TABLET | Refills: 0 | Status: SHIPPED | OUTPATIENT
Start: 2017-06-28 | End: 2017-10-04 | Stop reason: ALTCHOICE

## 2017-06-28 RX ORDER — TAMSULOSIN HYDROCHLORIDE 0.4 MG/1
0.4 CAPSULE ORAL DAILY
Qty: 30 CAPSULE | Refills: 0 | Status: SHIPPED | OUTPATIENT
Start: 2017-06-28 | End: 2017-10-04 | Stop reason: ALTCHOICE

## 2017-06-28 NOTE — PATIENT INSTRUCTIONS
Kidney Stone (with Pain)    The sharp cramping pain on either side of your lower back and nausea/vomiting that you have are because of a small stone that has formed in the kidney. It is now passing down a narrow tube (ureter) on its way to your bladder. Once the stone reaches your bladder, the pain will often stop. But it may come back as the stone continues to pass out of the bladder and through the urethra. The stone may pass in your urine stream in one piece. The size may be 1/16 inch to 1/4 inch (1 mm to 6 mm). Or, the stone may break up into pamella fragments that you may not even notice.  Once you have had a kidney stone, you are at risk of getting another one in the future. There are 4 types of kidney stones. Eighty percent are calcium stones--mostly calcium oxalate but also some with calcium phosphate. The other 3 types include uric acid stones, struvite stones (from a preceding infection), and rarely, cystine stones.  Most stones will pass on their own, but may take from a few hours to a few days. Sometimes the stone is too large to pass by itself. In that case, the healthcare provider will need to use other ways to remove the stone. These techniques include:  · Lithotripsy. This uses ultrasound waves to break up the stone.  · Ureteroscopy. This pushes a basket-like instrument through the urethra and bladder and into the ureter to pull out the stone.  · Various types of direct surgery through the skin  Home care  The following are general care guidelines:  · Drink plenty of fluids. This means at least 12, 8-ounce glasses of fluid--mostly water--a day.  · Each time you urinate, do so in a jar. Pour the urine from the jar through the strainer and into the toilet. Continue doing this until 24 hours after your pain stops. By then, if there was a kidney stone, it should pass from your bladder. Some stones dissolve into sand-like particles and pass right through the strainer. In that case, you wont ever see a  stone.  · Save any stone that you find in the strainer and bring it to your healthcare provider to look at. It may be possible to stop certain types of stones from forming. For this reason, it is important to know what kind of stone you have.  · Try to stay as active as possible. This will help the stone pass. Don't stay in bed unless your pain keeps you from getting up. You may notice a red, pink, or brown color to your urine. This is normal while passing a kidney stone.  · If you develop pain, you may take ibuprofen or naproxen for pain, unless another medicine was prescribed. If you have chronic liver or kidney disease, talk with your healthcare provider before taking these medicines. Also talk with your provider if you've had a stomach ulcer or GI bleeding.  Preventing stones  Each year for the next 5 to 7 years, you are at risk that a new stone will form. Your risk is a 50% chance over this time period. The risk is higher if you have a family history of kidney stones or have certain chronic illnesses like hypertension, obesity, or diabetes. But you can make changes to your lifestyle and diet that can lower your risk for another stone.  Most kidney stones are made of calcium. The following is advice for preventing another calcium stone. If you dont know the type of stone you have, follow this advice until the cause of your stone is found.  Things that help:  · The most important thing you can do is to drink plenty of fluids each day. See home care above.   · Eat foods that contain phytates. These include wheat, rice, rye, barley, and beans. Phytates are substances that may lower your risk for any type of stone to form.  · Eat more fruits and vegetables. Choose those that are high in potassium.  · Eat foods high in natural citrate like fruit and low-sugar fruit juices.  · Having too little calcium in your diet can put you at risk for calcium kidney stones. Eat a normal amount of calcium in your diet and  talk with your healthcare provider if you are taking calcium supplements. Cutting back on your calcium intake may raise your risk. New research shows that eating calcium-rich and oxalate-rich foods together lowers your risk for stones by binding the minerals in the stomach and intestines before they can reach the kidneys.    · Limit salt intake to 2 grams (1 teaspoon) per day. Use limited amounts when cooking, and dont add salt at the table. Processed and canned foods are usually high in salt.   · Spinach, rhubarb, peanuts, cashews, almonds, grapefruit, and grapefruit juice are all high oxalate foods. You should limit how much of these you eat. Or eat them with calcium-rich foods. These include dairy products, dark leafy greens, soy products, and calcium-enriched foods.  · Reducing the amount of animal meat and high protein foods in your diet may lower your risk for uric acid stones.  · Avoid excess sugar (sucrose) and fructose (sweetener in many soft drinks) in your diet.   · If you take vitamin C as a supplement, don't take more than 1,000 mg a day.  · A dietitian or your healthcare provider can give you information about changes in your diet that will help prevent more kidney stones from forming.  Follow-up care  Follow up with your healthcare provider, or as advised, if the pain lasts more than 48 hours. Talk with your provider about urine and blood tests to find out the cause of your stone. If you had an X-ray, CT scan, or other diagnostic test, you will be told of any new findings that may affect your care.  Call 911  Call 911 if you have any of these:  · Weakness, dizziness, or fainting  When to seek medical advice  Call your healthcare provider right away if any of these occur:  · Pain that is not controlled by the medicine given  · Repeated vomiting or unable to keep down fluids  · Fever of 100.4ºF (38ºC) or higher, or as directed by your healthcare provider  · Passage of solid red or brown urine (can't  see through it) or urine with lots of blood clots  · Foul-smelling or cloudy urine  · Unable to pass urine for 8 hours and increasing bladder pressure  Date Last Reviewed: 10/1/2016  © 5870-7466 [employer]. [address] [place] [address], [place] [address]. All rights reserved. This information is not intended as a substitute for professional medical care. Always follow your healthcare professional's instructions.

## 2017-06-28 NOTE — PROGRESS NOTES
Subjective:       Patient ID: Elisha Grimes is a 21 y.o. female.    Chief Complaint: Flank Pain (started this morning to side and lower abdomin,pt states hurts to sit,pt states urin has a loud smell) and Urinary Tract Infection    Patient is here today with ACUTE Left Flank Pain.    Patient is a 21 year old female with report that she started on Sunday, 6/25/17, with some burning on urination and frequency.  Reports she also had some vaginal burning.  States she did sit around in wet bathing suit all day Sunday at [place] so she treated with OTC AZO AND Monistat.  Patient reports the burning with urination continued and the urine now had a foul, strong odor.  She reports she then started with ACUTE left flank pain this morning - could not stay in college class - very painful flank pain and some mild pain to the left suprapubic area.  Denies any history of kidney stones.  Denies family history of kidney stones.  Patient is sexually active and on birth control - will check UA and Urine pregnancy and sent for CT stone study.      Flank Pain   This is a new problem. The current episode started today. The problem occurs constantly. The problem is unchanged. The pain is present in the costovertebral angle. Quality: acute sharp shooting/aching pain. The pain is at a severity of 8/10. The pain is severe. Associated symptoms include dysuria. Pertinent negatives include no abdominal pain, bladder incontinence, bowel incontinence, chest pain, fever, headaches or weakness. (Some mild left suprapubic pain; acute left flank pain) Risk factors: no history of kidney stones, denies chance of pregnancy. Treatments tried: AZO - no relief. The treatment provided no relief.   Urinary Tract Infection    This is a new problem. Episode onset: started on Sunday 6/25/17 with some dysuria/burning with urination. The problem occurs every urination. The problem has been gradually worsening. The quality of the pain is described as  burning. The pain is at a severity of 6/10. There has been no fever. She is sexually active. There is no history of pyelonephritis. Associated symptoms include flank pain, frequency and urgency. Pertinent negatives include no chills, nausea, vomiting, constipation or rash. Associated symptoms comments: Reports strong, foul odor to urine. Treatments tried: AZO. The treatment provided mild relief. There is no history of diabetes insipidus, diabetes mellitus, hypertension or kidney stones.       Previous Medications    LISDEXAMFETAMINE (VYVANSE) 50 MG CAPSULE    Take 1 capsule (50 mg total) by mouth every morning.    LISDEXAMFETAMINE (VYVANSE) 50 MG CAPSULE    Take 1 capsule (50 mg total) by mouth every morning.    LISDEXAMFETAMINE (VYVANSE) 50 MG CAPSULE    Take 1 capsule (50 mg total) by mouth every morning.    NAPROXEN (NAPROSYN) 375 MG TABLET    Take 375 mg by mouth.    NORETHINDRONE-E.ESTRADIOL-IRON (LO LOESTRIN FE) 1 MG-10 MCG (24)/10 MCG (2) TAB    TAKE 1 TABLET BY MOUTH DAILY    PIMECROLIMUS (ELIDEL) 1 % CREAM    Apply topically 2 (two) times daily.    TRIAMCINOLONE ACETONIDE 0.1% (KENALOG) 0.1 % CREAM    Apply to areas of eczema on body twice a day       Past Medical History:   Diagnosis Date    Attention deficit hyperactivity disorder (ADHD), combined type     Eczema        History reviewed. No pertinent surgical history.    Family History   Problem Relation Age of Onset    No Known Problems Mother     Heart disease Father      passed age 63 Acute MI; had prior cardiac arrest events prior -     Hypertension Father     Hyperlipidemia Father     No Known Problems Brother     Emphysema Maternal Grandmother        Social History     Social History    Marital status: Single     Spouse name: N/A    Number of children: N/A    Years of education: N/A     Occupational History    student      Social History Main Topics    Smoking status: Never Smoker    Smokeless tobacco: Never Used    Alcohol use Yes     "  Comment: once monthly on average; vodka and water usually 2 drinks on a night she drinks    Drug use: No    Sexual activity: Yes     Partners: Male     Birth control/ protection: OCP     Other Topics Concern    None     Social History Narrative    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       Review of Systems   Constitutional: Negative for appetite change, chills, fatigue, fever and unexpected weight change.   HENT: Negative for congestion, ear pain, mouth sores, nosebleeds, postnasal drip, rhinorrhea, sinus pressure, sneezing, sore throat, trouble swallowing and voice change.    Eyes: Negative for photophobia, pain, discharge, redness, itching and visual disturbance.   Respiratory: Negative for cough, chest tightness and shortness of breath.    Cardiovascular: Negative for chest pain, palpitations and leg swelling.   Gastrointestinal: Negative for abdominal pain, blood in stool, bowel incontinence, constipation, diarrhea, nausea and vomiting.   Genitourinary: Positive for dysuria, flank pain, frequency and urgency. Negative for bladder incontinence.   Musculoskeletal: Negative for arthralgias, back pain, joint swelling and myalgias.   Skin: Negative for color change and rash.   Allergic/Immunologic: Negative for immunocompromised state.   Neurological: Negative for dizziness, seizures, syncope, weakness and headaches.   Hematological: Negative for adenopathy. Does not bruise/bleed easily.   Psychiatric/Behavioral: Negative for agitation, dysphoric mood, sleep disturbance and suicidal ideas. The patient is not nervous/anxious.          Objective:     Vitals:    06/28/17 1435   BP: 130/80   BP Location: Right arm   Patient Position: Sitting   BP Method: Manual   Pulse: 89   Temp: 98.2 °F (36.8 °C)   TempSrc: Oral   SpO2: 99%   Weight: 60.7 kg (133 lb 12.8 oz)   Height: 5' 5" (1.651 m)          Physical Exam   Constitutional: She is oriented to person, place, and time. She appears " well-developed and well-nourished.   HENT:   Head: Normocephalic and atraumatic.   Right Ear: External ear normal.   Left Ear: External ear normal.   Nose: Nose normal.   Mouth/Throat: Oropharynx is clear and moist. No oropharyngeal exudate.   Eyes: EOM are normal. Pupils are equal, round, and reactive to light.   Neck: Normal range of motion. Neck supple. No tracheal deviation present. No thyromegaly present.   Cardiovascular: Normal rate, regular rhythm and normal heart sounds.    No murmur heard.  Pulmonary/Chest: Effort normal and breath sounds normal. No respiratory distress.   Abdominal: Soft. She exhibits no distension and no mass. There is tenderness in the suprapubic area. There is CVA tenderness.       + Molina's punch sign   Musculoskeletal: Normal range of motion. She exhibits no edema.        Arms:  + left flank pain.     Lymphadenopathy:     She has no cervical adenopathy.   Neurological: She is alert and oriented to person, place, and time. No cranial nerve deficit. Coordination normal.   Skin: Skin is warm and dry. No rash noted.   Psychiatric: She has a normal mood and affect.       Office Visit on 06/28/2017   Component Date Value Ref Range Status    Color, UA 06/28/2017 DK YELLOW   Final    Spec Grav UA 06/28/2017 1.005   Final    pH, UA 06/28/2017 6.5   Final    WBC, UA 06/28/2017 LARGE   Final    Nitrite, UA 06/28/2017 NEGATIVE   Final    Protein 06/28/2017 NEGATIVE   Final    Glucose, UA 06/28/2017 NEGATIVE   Final    Ketones, UA 06/28/2017 NEGATIVE   Final    Urobilinogen, UA 06/28/2017 0.2   Final    Bilirubin 06/28/2017 NEGATIVE   Final    Blood, UA 06/28/2017 MODERATE   Final    POC Preg Test, Ur 06/28/2017 Negative  Negative Final     Acceptable 06/28/2017 Yes   Final       Assessment:         ICD-10-CM ICD-9-CM   1. Acute left flank pain R10.9 789.09     338.19   2. Blood in urine R31.9 599.70   3. Dysuria R30.0 788.1   4. Urinary tract infection with  hematuria, site unspecified N39.0 599.0    R31.9        Plan:       Acute left flank pain  -  Patient sent straight to [facility] for CT stone study.  Advised patient that regardless of results of CT - needs to start Bactrim DS twice daily, Flomax daily and Toradol prn pain.  If no obstructing kidney stone, will continue antibiotic, flomax and toradol and will see if can pass stone.  If any obstruction present, will have to set patient up with urology for procedure.  Dr. Rosenthal, urologist, was in office today and reviewed patient's symptoms and POC.    -     CT Renal Stone Study ABD Pelvis WO; Future; Expected date: 06/28/2017  -     POCT urine pregnancy  -     tamsulosin (FLOMAX) 0.4 mg Cp24; Take 1 capsule (0.4 mg total) by mouth once daily.  Dispense: 30 capsule; Refill: 0  -     ketorolac (TORADOL) 10 mg tablet; Take 1 tablet (10 mg total) by mouth every 6 (six) hours.  Dispense: 30 tablet; Refill: 0    Blood in urine    Dysuria  -     POCT urinalysis, dipstick or tablet reag    Urinary tract infection with hematuria, site unspecified  -     Urine culture  -     sulfamethoxazole-trimethoprim 800-160mg (BACTRIM DS) 800-160 mg Tab; Take 1 tablet by mouth 2 (two) times daily.  Dispense: 20 tablet; Refill: 0      Return for pending CT stone study results; go straight to [facility] today for stone study.     Patient's Medications   New Prescriptions    KETOROLAC (TORADOL) 10 MG TABLET    Take 1 tablet (10 mg total) by mouth every 6 (six) hours.    SULFAMETHOXAZOLE-TRIMETHOPRIM 800-160MG (BACTRIM DS) 800-160 MG TAB    Take 1 tablet by mouth 2 (two) times daily.    TAMSULOSIN (FLOMAX) 0.4 MG CP24    Take 1 capsule (0.4 mg total) by mouth once daily.   Previous Medications    LISDEXAMFETAMINE (VYVANSE) 50 MG CAPSULE    Take 1 capsule (50 mg total) by mouth every morning.    LISDEXAMFETAMINE (VYVANSE) 50 MG CAPSULE    Take 1 capsule (50 mg total) by mouth every morning.    LISDEXAMFETAMINE (VYVANSE) 50 MG CAPSULE     Take 1 capsule (50 mg total) by mouth every morning.    NAPROXEN (NAPROSYN) 375 MG TABLET    Take 375 mg by mouth.    NORETHINDRONE-E.ESTRADIOL-IRON (LO LOESTRIN FE) 1 MG-10 MCG (24)/10 MCG (2) TAB    TAKE 1 TABLET BY MOUTH DAILY    PIMECROLIMUS (ELIDEL) 1 % CREAM    Apply topically 2 (two) times daily.    TRIAMCINOLONE ACETONIDE 0.1% (KENALOG) 0.1 % CREAM    Apply to areas of eczema on body twice a day   Modified Medications    No medications on file   Discontinued Medications    No medications on file

## 2017-06-29 ENCOUNTER — OFFICE VISIT (OUTPATIENT)
Dept: PODIATRY | Facility: CLINIC | Age: 22
End: 2017-06-29
Payer: COMMERCIAL

## 2017-06-29 VITALS
BODY MASS INDEX: 21.33 KG/M2 | HEIGHT: 65 IN | HEART RATE: 100 BPM | DIASTOLIC BLOOD PRESSURE: 83 MMHG | WEIGHT: 128 LBS | SYSTOLIC BLOOD PRESSURE: 125 MMHG

## 2017-06-29 DIAGNOSIS — L03.032 PARONYCHIA, TOE, LEFT: Primary | ICD-10-CM

## 2017-06-29 PROCEDURE — 11730 AVULSION NAIL PLATE SIMPLE 1: CPT | Mod: T1,S$GLB,, | Performed by: PODIATRIST

## 2017-06-29 PROCEDURE — 99999 PR PBB SHADOW E&M-EST. PATIENT-LVL III: CPT | Mod: PBBFAC,,, | Performed by: PODIATRIST

## 2017-06-29 PROCEDURE — 99203 OFFICE O/P NEW LOW 30 MIN: CPT | Mod: 25,S$GLB,, | Performed by: PODIATRIST

## 2017-06-29 NOTE — PATIENT INSTRUCTIONS
If you notice recurrence call and we can perform the phenol and alcohol matrixectomy procedure discussed. It would be performed in the office.   [facility] called. They are looking for clarification for a script that was sent. Please clarify directions and dosing for gabapentin and resend script. Kevin Nunn RN

## 2017-06-29 NOTE — PROGRESS NOTES
"Subjective:      Patient ID: Elisha Grimes is a 21 y.o. female.    Chief Complaint: Ingrown Toenail (Left 2nd Toe)    Complains of painful, infected left second toe ingrown nail for past 2 weeks. History of nail avulsion at urgent care center for left second toe medial nail border. Exercises regularly with spin classes and running. Feels toe more in spin class.    Vitals:    06/29/17 0941   BP: 125/83   Pulse: 100   Weight: 58.1 kg (128 lb)   Height: 5' 5" (1.651 m)   PainSc:   3   PainLoc: Toe      Past Medical History:   Diagnosis Date    Attention deficit hyperactivity disorder (ADHD), combined type     Eczema        No past surgical history on file.    Family History   Problem Relation Age of Onset    No Known Problems Mother     Heart disease Father      passed age 63 Acute MI; had prior cardiac arrest events prior -     Hypertension Father     Hyperlipidemia Father     No Known Problems Brother     Emphysema Maternal Grandmother        Social History     Social History    Marital status: Single     Spouse name: N/A    Number of children: N/A    Years of education: N/A     Occupational History    student      Social History Main Topics    Smoking status: Never Smoker    Smokeless tobacco: Never Used    Alcohol use Yes      Comment: once monthly on average; vodka and water usually 2 drinks on a night she drinks    Drug use: No    Sexual activity: Yes     Partners: Male     Birth control/ protection: OCP     Other Topics Concern    None     Social History Narrative    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       Current Outpatient Prescriptions   Medication Sig Dispense Refill    ketorolac (TORADOL) 10 mg tablet Take 1 tablet (10 mg total) by mouth every 6 (six) hours. 30 tablet 0    lisdexamfetamine (VYVANSE) 50 MG capsule Take 1 capsule (50 mg total) by mouth every morning. 30 capsule 0    [START ON 7/21/2017] lisdexamfetamine (VYVANSE) 50 MG capsule Take 1 " capsule (50 mg total) by mouth every morning. 30 capsule 0    [START ON 8/21/2017] lisdexamfetamine (VYVANSE) 50 MG capsule Take 1 capsule (50 mg total) by mouth every morning. 30 capsule 0    naproxen (NAPROSYN) 375 MG tablet Take 375 mg by mouth.      norethindrone-e.estradiol-iron (LO LOESTRIN FE) 1 mg-10 mcg (24)/10 mcg (2) Tab TAKE 1 TABLET BY MOUTH DAILY      pimecrolimus (ELIDEL) 1 % cream Apply topically 2 (two) times daily.      sulfamethoxazole-trimethoprim 800-160mg (BACTRIM DS) 800-160 mg Tab Take 1 tablet by mouth 2 (two) times daily. 20 tablet 0    tamsulosin (FLOMAX) 0.4 mg Cp24 Take 1 capsule (0.4 mg total) by mouth once daily. 30 capsule 0    triamcinolone acetonide 0.1% (KENALOG) 0.1 % cream Apply to areas of eczema on body twice a day      promethazine (PHENERGAN) 25 MG tablet Take 1 tablet (25 mg total) by mouth every 4 (four) hours as needed for Nausea. 30 tablet 0     No current facility-administered medications for this visit.        Review of patient's allergies indicates:  No Known Allergies      Review of Systems   Constitution: Negative for chills, fever, weakness and malaise/fatigue.   Cardiovascular: Negative for chest pain, claudication and leg swelling.   Respiratory: Negative for cough and shortness of breath.    Skin: Positive for color change and nail changes.   Musculoskeletal: Negative for back pain, joint pain, muscle cramps and muscle weakness.   Gastrointestinal: Negative for nausea and vomiting.   Neurological: Negative for numbness and paresthesias.   Psychiatric/Behavioral: Negative for altered mental status.           Objective:      Physical Exam   Constitutional: She is oriented to person, place, and time. She appears well-developed and well-nourished. No distress.   Cardiovascular: Intact distal pulses.    Pulses:       Dorsalis pedis pulses are 2+ on the right side, and 2+ on the left side.        Posterior tibial pulses are 2+ on the right side, and 2+ on the  left side.   CFT< 3 secs all toes bilateral foot, skin temp warm bilateral foot, diminished digital hair growth bilateral foot, no lower extremity edema bilateral.     Musculoskeletal:   Adequate joint range of motion without pain, limitation, nor crepitation Bilateral feet and ankle joints. Muscle strength is 5/5 in all groups bilaterally.    Elongated second toe bilateral.   Neurological: She is alert and oriented to person, place, and time. She has normal strength. No sensory deficit.   Skin: Skin is warm, dry and intact. Capillary refill takes less than 2 seconds. No ecchymosis and no rash noted. She is not diaphoretic. No cyanosis or erythema. No pallor. Nails show no clubbing.   Localized edema, erythema and pain lateral nail border left second toe with scant purulent drainage, no odor.    Remainder of toenails are normal in appearance bilateral foot.             Assessment:       Encounter Diagnosis   Name Primary?    Paronychia, toe, left Yes         Plan:       Elisha was seen today for ingrown toenail.    Diagnoses and all orders for this visit:    Paronychia, toe, left      I counseled the patient on her conditions, their implications and medical management.    Procedure: Left second toe lateral nail border avulsion  Pathology: none  EBL: < 1 mL  Materials: none  Injectibles: 2.5 mL 1% plain lidocaine  Complications: none    Procedure in detail: Time out called verifying patient, procedure and toe. Skin prepped with alcohol followed by ethyl chloride application and infiltration of 2.5 mL 1% plain lidocaine into proximal toe. Skin was prepped with betadine. A sterile tourniquet was applied to the toe. Sterile instrumentation was used to excise the affected nail border. The tourniquet was released noting instant return of the toe color and capillary fill time was instant. Bacitracin ointment was applied to the wound followed by gauze secured with coban.     The patient tolerated the procedure well  without complication.    Discussed inspecting her tennis shoes to ensure adequate length with thumb space from end of shoe to the end of toe box.    RTC 2 weeks or prn.      .

## 2017-06-29 NOTE — LETTER
July 2, 2017      [place]  [address] [facility]  [address]  [facility] [place] [address]           [facility]  [address]  [place] [address]  Phone: [phone]          Patient: Elisha Grimes   MR Number: [id number]   YOB: 1995   Date of Visit: 6/29/2017       Dear Naila Smith:    Thank you for referring Elisha Grimes to me for evaluation. Attached you will find relevant portions of my assessment and plan of care.    If you have questions, please do not hesitate to call me. I look forward to following Elisha Grimes along with you.    Sincerely,    Can Davis, MARC    Enclosure  CC:  No Recipients    If you would like to receive this communication electronically, please contact [email or web address] or [phone] to request more information on [employer] access.    For providers and/or their staff who would like to refer a patient to [facility], please contact us through our one-stop-shop provider referral line, Brit Ramírez, at [phone].    If you feel you have received this communication in error or would no longer like to receive these types of communications, please e-mail [email or web address]

## 2017-06-30 ENCOUNTER — TELEPHONE (OUTPATIENT)
Dept: FAMILY MEDICINE | Facility: CLINIC | Age: 22
End: 2017-06-30

## 2017-06-30 RX ORDER — PROMETHAZINE HYDROCHLORIDE 25 MG/1
25 TABLET ORAL EVERY 4 HOURS PRN
Qty: 30 TABLET | Refills: 0 | Status: SHIPPED | OUTPATIENT
Start: 2017-06-30 | End: 2017-10-04 | Stop reason: ALTCHOICE

## 2017-06-30 NOTE — TELEPHONE ENCOUNTER
----- Message from Regina Zhu sent at 6/30/2017  8:10 AM CDT -----  Contact: mom/ [phone]  She needs to have lab orders put in to have labs drawn at [facility] this morning.

## 2017-06-30 NOTE — TELEPHONE ENCOUNTER
Spoke with the patient on phone.  Patient states that the left flank pain and urinary symptoms are mostly resolved.  But last night felt nauseated but with no vomiting.  States she just feels shaky and feels like heart racing - she reports heart rate was 100 at podiatrist.  She had constipation - large amount of retained stool on CT so was advised to take bottle mag citrate - patient reports she instead took Miralax and had good results.  Advised patient that she should take a capful of Mralax daily to ensure complete evacuation of retained stool.  Advised to make sure she is staying hydrated - plenty of fluids.  She report that the nausea is now resolved, just doesn't feel back to herself.  Denies fever.  Advised patient that she is already on antibiotic for + UTI on dipstick - since patient is feeling better today - will hold off on labs and prescribe a nausea medication.  Call for worsening or change in symptoms.  Patient verbalizes understanding.

## 2017-07-03 ENCOUNTER — PATIENT MESSAGE (OUTPATIENT)
Dept: FAMILY MEDICINE | Facility: CLINIC | Age: 22
End: 2017-07-03

## 2017-07-03 DIAGNOSIS — R31.9 URINARY TRACT INFECTION WITH HEMATURIA, SITE UNSPECIFIED: Primary | ICD-10-CM

## 2017-07-03 DIAGNOSIS — N39.0 URINARY TRACT INFECTION WITH HEMATURIA, SITE UNSPECIFIED: Primary | ICD-10-CM

## 2017-07-03 RX ORDER — CIPROFLOXACIN 500 MG/1
500 TABLET ORAL EVERY 12 HOURS
Qty: 14 TABLET | Refills: 0 | Status: SHIPPED | OUTPATIENT
Start: 2017-07-03 | End: 2017-07-10

## 2017-10-04 ENCOUNTER — OFFICE VISIT (OUTPATIENT)
Dept: FAMILY MEDICINE | Facility: CLINIC | Age: 22
End: 2017-10-04
Payer: COMMERCIAL

## 2017-10-04 VITALS
TEMPERATURE: 98 F | HEIGHT: 65 IN | BODY MASS INDEX: 22.51 KG/M2 | DIASTOLIC BLOOD PRESSURE: 64 MMHG | SYSTOLIC BLOOD PRESSURE: 100 MMHG | WEIGHT: 135.13 LBS | OXYGEN SATURATION: 99 % | HEART RATE: 65 BPM

## 2017-10-04 DIAGNOSIS — F90.2 ATTENTION DEFICIT HYPERACTIVITY DISORDER (ADHD), COMBINED TYPE: Primary | ICD-10-CM

## 2017-10-04 PROCEDURE — 99213 OFFICE O/P EST LOW 20 MIN: CPT | Mod: S$GLB,,, | Performed by: NURSE PRACTITIONER

## 2017-10-04 PROCEDURE — 99999 PR PBB SHADOW E&M-EST. PATIENT-LVL IV: CPT | Mod: PBBFAC,,, | Performed by: NURSE PRACTITIONER

## 2017-10-04 RX ORDER — HYDROXYZINE HYDROCHLORIDE 10 MG/1
TABLET, FILM COATED ORAL
COMMUNITY
Start: 2017-09-11 | End: 2018-04-16

## 2017-10-04 RX ORDER — LISDEXAMFETAMINE DIMESYLATE 50 MG/1
50 CAPSULE ORAL EVERY MORNING
Qty: 30 CAPSULE | Refills: 0 | Status: SHIPPED | OUTPATIENT
Start: 2017-11-03 | End: 2018-01-12 | Stop reason: SDUPTHER

## 2017-10-04 RX ORDER — LISDEXAMFETAMINE DIMESYLATE 50 MG/1
50 CAPSULE ORAL EVERY MORNING
Qty: 30 CAPSULE | Refills: 0 | Status: SHIPPED | OUTPATIENT
Start: 2017-10-04 | End: 2018-01-12 | Stop reason: SDUPTHER

## 2017-10-04 RX ORDER — DOXYCYCLINE 40 MG/1
40 CAPSULE ORAL DAILY
COMMUNITY
End: 2018-08-06

## 2017-10-04 RX ORDER — LISDEXAMFETAMINE DIMESYLATE 50 MG/1
50 CAPSULE ORAL EVERY MORNING
Qty: 30 CAPSULE | Refills: 0 | Status: SHIPPED | OUTPATIENT
Start: 2017-12-01 | End: 2018-01-12 | Stop reason: SDUPTHER

## 2017-10-04 NOTE — PROGRESS NOTES
"Subjective:       Patient ID: Elisha Grimes is a 21 y.o. female.    Chief Complaint: Medication Refill    Patient has ADHD that is well controlled on Vyvanse at present dose.  Patient graduated [employer] with a 4 year degree in Biology and has plans to go to Physician Assistant program in the future.  /64 (BP Location: Left arm, Patient Position: Sitting, BP Method: Medium (Manual))   Pulse 65   Temp 98.4 °F (36.9 °C) (Oral)   Ht 5' 5" (1.651 m)   Wt 61.3 kg (135 lb 2.3 oz)   SpO2 99%   BMI 22.49 kg/m²             Previous Medications    DOXYCYCLINE (ORACEA) 40 MG CAPSULE    Take 40 mg by mouth once daily.    HYDROXYZINE HCL (ATARAX) 10 MG TAB        LISDEXAMFETAMINE (VYVANSE) 50 MG CAPSULE    Take 1 capsule (50 mg total) by mouth every morning.    LISDEXAMFETAMINE (VYVANSE) 50 MG CAPSULE    Take 1 capsule (50 mg total) by mouth every morning.    LISDEXAMFETAMINE (VYVANSE) 50 MG CAPSULE    Take 1 capsule (50 mg total) by mouth every morning.    NAPROXEN (NAPROSYN) 375 MG TABLET    Take 375 mg by mouth.    NORETHINDRONE-E.ESTRADIOL-IRON (LO LOESTRIN FE) 1 MG-10 MCG (24)/10 MCG (2) TAB    TAKE 1 TABLET BY MOUTH DAILY       Past Medical History:   Diagnosis Date    Attention deficit hyperactivity disorder (ADHD), combined type     Eczema        History reviewed. No pertinent surgical history.    Family History   Problem Relation Age of Onset    No Known Problems Mother     Heart disease Father      passed age 63 Acute MI; had prior cardiac arrest events prior -     Hypertension Father     Hyperlipidemia Father     No Known Problems Brother     Emphysema Maternal Grandmother        Social History     Social History    Marital status: Single     Spouse name: N/A    Number of children: N/A    Years of education: N/A     Occupational History    student      Social History Main Topics    Smoking status: Never Smoker    Smokeless tobacco: Never Used    Alcohol use Yes      Comment: once " "monthly on average; vodka and water usually 2 drinks on a night she drinks    Drug use: No    Sexual activity: Yes     Partners: Male     Birth control/ protection: OCP     Other Topics Concern    None     Social History Narrative    Going to [employer] [facility] [employer] - with plans for PA school.       Review of Systems   Constitutional: Negative for appetite change, chills, fatigue, fever and unexpected weight change.   HENT: Negative for congestion, ear pain, mouth sores, nosebleeds, postnasal drip, rhinorrhea, sinus pressure, sneezing, sore throat, trouble swallowing and voice change.    Eyes: Negative for photophobia, pain, discharge, redness, itching and visual disturbance.   Respiratory: Negative for cough, chest tightness and shortness of breath.    Cardiovascular: Negative for chest pain, palpitations and leg swelling.   Gastrointestinal: Negative for abdominal pain, blood in stool, constipation, diarrhea, nausea and vomiting.   Genitourinary: Negative for dysuria, frequency, hematuria and urgency.   Musculoskeletal: Negative for arthralgias, back pain, joint swelling and myalgias.   Skin: Negative for color change and rash.   Allergic/Immunologic: Negative for immunocompromised state.   Neurological: Negative for dizziness, seizures, syncope, weakness and headaches.   Hematological: Negative for adenopathy. Does not bruise/bleed easily.   Psychiatric/Behavioral: Negative for agitation, dysphoric mood, sleep disturbance and suicidal ideas. The patient is not nervous/anxious.          Objective:     Vitals:    10/04/17 1337   BP: 100/64   BP Location: Left arm   Patient Position: Sitting   BP Method: Medium (Manual)   Pulse: 65   Temp: 98.4 °F (36.9 °C)   TempSrc: Oral   SpO2: 99%   Weight: 61.3 kg (135 lb 2.3 oz)   Height: 5' 5" (1.651 m)          Physical Exam   Constitutional: She is oriented to person, place, and time. She appears well-developed and well-nourished.   HENT:   Head: Normocephalic and " atraumatic.   Right Ear: External ear normal.   Left Ear: External ear normal.   Nose: Nose normal.   Mouth/Throat: Oropharynx is clear and moist. No oropharyngeal exudate.   Eyes: EOM are normal. Pupils are equal, round, and reactive to light.   Neck: Normal range of motion. Neck supple. No tracheal deviation present. No thyromegaly present.   Cardiovascular: Normal rate, regular rhythm and normal heart sounds.    No murmur heard.  Pulmonary/Chest: Effort normal and breath sounds normal. No respiratory distress.   Abdominal: Soft. She exhibits no distension.   Musculoskeletal: Normal range of motion. She exhibits no edema.   Lymphadenopathy:     She has no cervical adenopathy.   Neurological: She is alert and oriented to person, place, and time. No cranial nerve deficit. Coordination normal.   Skin: Skin is warm and dry.   Psychiatric: She has a normal mood and affect.         Assessment:         ICD-10-CM ICD-9-CM   1. Attention deficit hyperactivity disorder (ADHD), combined type F90.2 314.01       Plan:       Attention deficit hyperactivity disorder (ADHD), combined type  -  Controlled on present medication - follow up in 3 months.  -     lisdexamfetamine (VYVANSE) 50 MG capsule; Take 1 capsule (50 mg total) by mouth every morning.  Dispense: 30 capsule; Refill: 0  -     lisdexamfetamine (VYVANSE) 50 MG capsule; Take 1 capsule (50 mg total) by mouth every morning.  Dispense: 30 capsule; Refill: 0  -     lisdexamfetamine (VYVANSE) 50 MG capsule; Take 1 capsule (50 mg total) by mouth every morning.  Dispense: 30 capsule; Refill: 0      Return in about 3 months (around 1/4/2018).     Patient's Medications   New Prescriptions    No medications on file   Previous Medications    DOXYCYCLINE (ORACEA) 40 MG CAPSULE    Take 40 mg by mouth once daily.    HYDROXYZINE HCL (ATARAX) 10 MG TAB        NAPROXEN (NAPROSYN) 375 MG TABLET    Take 375 mg by mouth.    NORETHINDRONE-E.ESTRADIOL-IRON (LO LOESTRIN FE) 1 MG-10 MCG  (24)/10 MCG (2) TAB    TAKE 1 TABLET BY MOUTH DAILY   Modified Medications    Modified Medication Previous Medication    LISDEXAMFETAMINE (VYVANSE) 50 MG CAPSULE lisdexamfetamine (VYVANSE) 50 MG capsule       Take 1 capsule (50 mg total) by mouth every morning.    Take 1 capsule (50 mg total) by mouth every morning.    LISDEXAMFETAMINE (VYVANSE) 50 MG CAPSULE lisdexamfetamine (VYVANSE) 50 MG capsule       Take 1 capsule (50 mg total) by mouth every morning.    Take 1 capsule (50 mg total) by mouth every morning.    LISDEXAMFETAMINE (VYVANSE) 50 MG CAPSULE lisdexamfetamine (VYVANSE) 50 MG capsule       Take 1 capsule (50 mg total) by mouth every morning.    Take 1 capsule (50 mg total) by mouth every morning.   Discontinued Medications    KETOROLAC (TORADOL) 10 MG TABLET    Take 1 tablet (10 mg total) by mouth every 6 (six) hours.    PIMECROLIMUS (ELIDEL) 1 % CREAM    Apply topically 2 (two) times daily.    PROMETHAZINE (PHENERGAN) 25 MG TABLET    Take 1 tablet (25 mg total) by mouth every 4 (four) hours as needed for Nausea.    SULFAMETHOXAZOLE-TRIMETHOPRIM 800-160MG (BACTRIM DS) 800-160 MG TAB    Take 1 tablet by mouth 2 (two) times daily.    TAMSULOSIN (FLOMAX) 0.4 MG CP24    Take 1 capsule (0.4 mg total) by mouth once daily.    TRIAMCINOLONE ACETONIDE 0.1% (KENALOG) 0.1 % CREAM    Apply to areas of eczema on body twice a day

## 2018-01-12 ENCOUNTER — OFFICE VISIT (OUTPATIENT)
Dept: FAMILY MEDICINE | Facility: CLINIC | Age: 23
End: 2018-01-12
Payer: COMMERCIAL

## 2018-01-12 VITALS
OXYGEN SATURATION: 100 % | WEIGHT: 130.94 LBS | TEMPERATURE: 99 F | SYSTOLIC BLOOD PRESSURE: 110 MMHG | HEART RATE: 70 BPM | DIASTOLIC BLOOD PRESSURE: 60 MMHG | HEIGHT: 65 IN | BODY MASS INDEX: 21.81 KG/M2

## 2018-01-12 DIAGNOSIS — Z23 FLU VACCINE NEED: ICD-10-CM

## 2018-01-12 DIAGNOSIS — F90.2 ATTENTION DEFICIT HYPERACTIVITY DISORDER (ADHD), COMBINED TYPE: Primary | ICD-10-CM

## 2018-01-12 PROCEDURE — 99213 OFFICE O/P EST LOW 20 MIN: CPT | Mod: 25,S$GLB,, | Performed by: NURSE PRACTITIONER

## 2018-01-12 PROCEDURE — 99999 PR PBB SHADOW E&M-EST. PATIENT-LVL V: CPT | Mod: PBBFAC,,, | Performed by: NURSE PRACTITIONER

## 2018-01-12 PROCEDURE — 90686 IIV4 VACC NO PRSV 0.5 ML IM: CPT | Mod: S$GLB,,, | Performed by: FAMILY MEDICINE

## 2018-01-12 PROCEDURE — 90471 IMMUNIZATION ADMIN: CPT | Mod: S$GLB,,, | Performed by: FAMILY MEDICINE

## 2018-01-12 RX ORDER — LISDEXAMFETAMINE DIMESYLATE 50 MG/1
50 CAPSULE ORAL EVERY MORNING
Qty: 30 CAPSULE | Refills: 0 | Status: SHIPPED | OUTPATIENT
Start: 2018-02-12 | End: 2018-08-06 | Stop reason: SDUPTHER

## 2018-01-12 RX ORDER — LISDEXAMFETAMINE DIMESYLATE 50 MG/1
50 CAPSULE ORAL EVERY MORNING
Qty: 30 CAPSULE | Refills: 0 | Status: SHIPPED | OUTPATIENT
Start: 2018-03-13 | End: 2018-08-06 | Stop reason: SDUPTHER

## 2018-01-12 RX ORDER — LISDEXAMFETAMINE DIMESYLATE 50 MG/1
50 CAPSULE ORAL EVERY MORNING
Qty: 30 CAPSULE | Refills: 0 | Status: SHIPPED | OUTPATIENT
Start: 2018-01-12 | End: 2018-04-16 | Stop reason: SDUPTHER

## 2018-01-12 NOTE — PROGRESS NOTES
Subjective:       Patient ID: Elisha Grimes is a 22 y.o. female.    Chief Complaint: Medication Refill    Patient has ADHD that is well controlled on Vyvanse at present dose.  Patient graduated [employer] with a 4 year degree in Biology and has plans to go to Physician Assistant program in the future.        Previous Medications    DOXYCYCLINE (ORACEA) 40 MG CAPSULE    Take 40 mg by mouth once daily.    HYDROXYZINE HCL (ATARAX) 10 MG TAB        LISDEXAMFETAMINE (VYVANSE) 50 MG CAPSULE    Take 1 capsule (50 mg total) by mouth every morning.    LISDEXAMFETAMINE (VYVANSE) 50 MG CAPSULE    Take 1 capsule (50 mg total) by mouth every morning.    LISDEXAMFETAMINE (VYVANSE) 50 MG CAPSULE    Take 1 capsule (50 mg total) by mouth every morning.    NAPROXEN (NAPROSYN) 375 MG TABLET    Take 375 mg by mouth.    NORETHINDRONE-E.ESTRADIOL-IRON (LO LOESTRIN FE) 1 MG-10 MCG (24)/10 MCG (2) TAB    TAKE 1 TABLET BY MOUTH DAILY       Past Medical History:   Diagnosis Date    Attention deficit hyperactivity disorder (ADHD), combined type     Eczema        History reviewed. No pertinent surgical history.    Family History   Problem Relation Age of Onset    No Known Problems Mother     Heart disease Father      passed age 63 Acute MI; had prior cardiac arrest events prior -     Hypertension Father     Hyperlipidemia Father     No Known Problems Brother     Emphysema Maternal Grandmother        Social History     Social History    Marital status: Single     Spouse name: N/A    Number of children: N/A    Years of education: N/A     Occupational History    student      Social History Main Topics    Smoking status: Never Smoker    Smokeless tobacco: Never Used    Alcohol use Yes      Comment: once monthly on average; vodka and water usually 2 drinks on a night she drinks    Drug use: No    Sexual activity: Yes     Partners: Male     Birth control/ protection: OCP     Other Topics Concern    None     Social History  "Narrative    Going to [employer] - with plans for PA school.       Review of Systems   Constitutional: Negative for appetite change, chills, fatigue, fever and unexpected weight change.   HENT: Negative for congestion, ear pain, mouth sores, nosebleeds, postnasal drip, rhinorrhea, sinus pressure, sneezing, sore throat, trouble swallowing and voice change.    Eyes: Negative for photophobia, pain, discharge, redness, itching and visual disturbance.   Respiratory: Negative for cough, chest tightness and shortness of breath.    Cardiovascular: Negative for chest pain, palpitations and leg swelling.   Gastrointestinal: Negative for abdominal pain, blood in stool, constipation, diarrhea, nausea and vomiting.   Genitourinary: Negative for dysuria, frequency, hematuria and urgency.   Musculoskeletal: Negative for arthralgias, back pain, joint swelling and myalgias.   Skin: Negative for color change and rash.   Allergic/Immunologic: Negative for immunocompromised state.   Neurological: Negative for dizziness, seizures, syncope, weakness and headaches.   Hematological: Negative for adenopathy. Does not bruise/bleed easily.   Psychiatric/Behavioral: Negative for agitation, dysphoric mood, sleep disturbance and suicidal ideas. The patient is not nervous/anxious.          Objective:     Vitals:    01/12/18 1117   BP: 110/60   BP Location: Left arm   Patient Position: Sitting   BP Method: Medium (Manual)   Pulse: 70   Temp: 98.5 °F (36.9 °C)   SpO2: 100%   Weight: 59.4 kg (130 lb 15.3 oz)   Height: 5' 5" (1.651 m)          Physical Exam   Constitutional: She is oriented to person, place, and time. She appears well-developed and well-nourished.   HENT:   Head: Normocephalic and atraumatic.   Right Ear: External ear normal.   Left Ear: External ear normal.   Nose: Nose normal.   Mouth/Throat: Oropharynx is clear and moist. No oropharyngeal exudate.   Eyes: EOM are normal. Pupils are equal, round, and reactive to " light.   Neck: Normal range of motion. Neck supple. No tracheal deviation present. No thyromegaly present.   Cardiovascular: Normal rate, regular rhythm and normal heart sounds.    No murmur heard.  Pulmonary/Chest: Effort normal and breath sounds normal. No respiratory distress.   Abdominal: Soft. She exhibits no distension.   Musculoskeletal: Normal range of motion. She exhibits no edema.   Lymphadenopathy:     She has no cervical adenopathy.   Neurological: She is alert and oriented to person, place, and time. No cranial nerve deficit. Coordination normal.   Skin: Skin is warm and dry.   Psychiatric: She has a normal mood and affect.         Assessment:         ICD-10-CM ICD-9-CM   1. Attention deficit hyperactivity disorder (ADHD), combined type F90.2 314.01   2. Flu vaccine need Z23 V04.81       Plan:       Attention deficit hyperactivity disorder (ADHD), combined type  -     lisdexamfetamine (VYVANSE) 50 MG capsule; Take 1 capsule (50 mg total) by mouth every morning.  Dispense: 30 capsule; Refill: 0  -     lisdexamfetamine (VYVANSE) 50 MG capsule; Take 1 capsule (50 mg total) by mouth every morning.  Dispense: 30 capsule; Refill: 0  -     lisdexamfetamine (VYVANSE) 50 MG capsule; Take 1 capsule (50 mg total) by mouth every morning.  Dispense: 30 capsule; Refill: 0    Flu vaccine need  -     Influenza - Quadrivalent (3 years & older) (PF)      Follow-up in about 3 months (around 4/12/2018) for med check.     Patient's Medications   New Prescriptions    No medications on file   Previous Medications    DOXYCYCLINE (ORACEA) 40 MG CAPSULE    Take 40 mg by mouth once daily.    HYDROXYZINE HCL (ATARAX) 10 MG TAB        NAPROXEN (NAPROSYN) 375 MG TABLET    Take 375 mg by mouth.    NORETHINDRONE-E.ESTRADIOL-IRON (LO LOESTRIN FE) 1 MG-10 MCG (24)/10 MCG (2) TAB    TAKE 1 TABLET BY MOUTH DAILY   Modified Medications    Modified Medication Previous Medication    LISDEXAMFETAMINE (VYVANSE) 50 MG CAPSULE lisdexamfetamine  (VYVANSE) 50 MG capsule       Take 1 capsule (50 mg total) by mouth every morning.    Take 1 capsule (50 mg total) by mouth every morning.    LISDEXAMFETAMINE (VYVANSE) 50 MG CAPSULE lisdexamfetamine (VYVANSE) 50 MG capsule       Take 1 capsule (50 mg total) by mouth every morning.    Take 1 capsule (50 mg total) by mouth every morning.    LISDEXAMFETAMINE (VYVANSE) 50 MG CAPSULE lisdexamfetamine (VYVANSE) 50 MG capsule       Take 1 capsule (50 mg total) by mouth every morning.    Take 1 capsule (50 mg total) by mouth every morning.   Discontinued Medications    No medications on file

## 2018-01-22 ENCOUNTER — HOSPITAL ENCOUNTER (OUTPATIENT)
Dept: RADIOLOGY | Facility: HOSPITAL | Age: 23
Discharge: HOME | End: 2018-01-22
Attending: PODIATRIST
Payer: COMMERCIAL

## 2018-01-22 ENCOUNTER — OFFICE VISIT (OUTPATIENT)
Dept: PODIATRY | Facility: CLINIC | Age: 23
End: 2018-01-22
Payer: COMMERCIAL

## 2018-01-22 VITALS
BODY MASS INDEX: 21.66 KG/M2 | WEIGHT: 130 LBS | SYSTOLIC BLOOD PRESSURE: 111 MMHG | DIASTOLIC BLOOD PRESSURE: 68 MMHG | HEART RATE: 68 BPM | HEIGHT: 65 IN

## 2018-01-22 DIAGNOSIS — M67.40 MUCOID CYST OF JOINT: Primary | ICD-10-CM

## 2018-01-22 DIAGNOSIS — M77.52 BURSITIS OF LEFT FOOT: ICD-10-CM

## 2018-01-22 DIAGNOSIS — M67.40 MUCOID CYST OF JOINT: ICD-10-CM

## 2018-01-22 PROCEDURE — 99213 OFFICE O/P EST LOW 20 MIN: CPT | Mod: 25,S$GLB,, | Performed by: PODIATRIST

## 2018-01-22 PROCEDURE — 20600 DRAIN/INJ JOINT/BURSA W/O US: CPT | Mod: LT,S$GLB,, | Performed by: PODIATRIST

## 2018-01-22 PROCEDURE — 73630 X-RAY EXAM OF FOOT: CPT | Mod: TC,FY,LT

## 2018-01-22 PROCEDURE — 73630 X-RAY EXAM OF FOOT: CPT | Mod: 26,LT,, | Performed by: RADIOLOGY

## 2018-01-22 PROCEDURE — 99999 PR PBB SHADOW E&M-EST. PATIENT-LVL III: CPT | Mod: PBBFAC,,, | Performed by: PODIATRIST

## 2018-01-22 RX ORDER — CEPHALEXIN 500 MG/1
500 CAPSULE ORAL EVERY 6 HOURS
Qty: 40 CAPSULE | Refills: 0 | Status: SHIPPED | OUTPATIENT
Start: 2018-01-22 | End: 2018-02-15

## 2018-01-22 RX ORDER — DEXAMETHASONE SODIUM PHOSPHATE 4 MG/ML
6 INJECTION, SOLUTION INTRA-ARTICULAR; INTRALESIONAL; INTRAMUSCULAR; INTRAVENOUS; SOFT TISSUE ONCE
Status: COMPLETED | OUTPATIENT
Start: 2018-01-22 | End: 2018-01-22

## 2018-01-22 RX ORDER — MUPIROCIN 20 MG/G
OINTMENT TOPICAL 2 TIMES DAILY
Qty: 22 G | Refills: 1 | Status: SHIPPED | OUTPATIENT
Start: 2018-01-22 | End: 2018-04-16

## 2018-01-22 RX ADMIN — DEXAMETHASONE SODIUM PHOSPHATE 6 MG: 4 INJECTION, SOLUTION INTRA-ARTICULAR; INTRALESIONAL; INTRAMUSCULAR; INTRAVENOUS; SOFT TISSUE at 08:01

## 2018-01-22 NOTE — PROGRESS NOTES
"Subjective:      Patient ID: Elisha Grimes is a 22 y.o. female.    Chief Complaint: Nail Problem (3rd left metatarsal, 2nd right metatarsal)    Complains of painful left third toe, right second toe and pain under ball of left foot for past several weeks. History of previous ingrown nail treated with partial nail avulsion 6/29/17. Accompanied by her mother. Denies trauma. Relates she exercises a lot especially running. She completed a course of oral antiibiotic that she she can not recall the name > 2 weeks ago prescribed per an C. Wearing slip on shoes today.     Vitals:    01/22/18 0753   BP: 111/68   Pulse: 68   Weight: 59 kg (130 lb)   Height: 5' 5" (1.651 m)   PainSc:   7   PainLoc: Toe      Past Medical History:   Diagnosis Date    Attention deficit hyperactivity disorder (ADHD), combined type     Eczema        No past surgical history on file.    Family History   Problem Relation Age of Onset    No Known Problems Mother     Heart disease Father      passed age 63 Acute MI; had prior cardiac arrest events prior -     Hypertension Father     Hyperlipidemia Father     No Known Problems Brother     Emphysema Maternal Grandmother        Social History     Social History    Marital status: Single     Spouse name: N/A    Number of children: N/A    Years of education: N/A     Occupational History    student      Social History Main Topics    Smoking status: Never Smoker    Smokeless tobacco: Never Used    Alcohol use Yes      Comment: once monthly on average; vodka and water usually 2 drinks on a night she drinks    Drug use: No    Sexual activity: Yes     Partners: Male     Birth control/ protection: OCP     Other Topics Concern    None     Social History Narrative    Going to [employer] - major Biology - with plans for PA school.       Current Outpatient Prescriptions   Medication Sig Dispense Refill    cephALEXin (KEFLEX) 500 MG capsule Take 1 capsule (500 mg total) by mouth every 6 (six) " hours. 40 capsule 0    doxycycline (ORACEA) 40 mg capsule Take 40 mg by mouth once daily.      hydrOXYzine HCl (ATARAX) 10 MG Tab       lisdexamfetamine (VYVANSE) 50 MG capsule Take 1 capsule (50 mg total) by mouth every morning. 30 capsule 0    [START ON 2/12/2018] lisdexamfetamine (VYVANSE) 50 MG capsule Take 1 capsule (50 mg total) by mouth every morning. 30 capsule 0    [START ON 3/13/2018] lisdexamfetamine (VYVANSE) 50 MG capsule Take 1 capsule (50 mg total) by mouth every morning. 30 capsule 0    mupirocin (BACTROBAN) 2 % ointment Apply topically 2 (two) times daily. 22 g 1    naproxen (NAPROSYN) 375 MG tablet Take 375 mg by mouth.      norethindrone-e.estradiol-iron (LO LOESTRIN FE) 1 mg-10 mcg (24)/10 mcg (2) Tab TAKE 1 TABLET BY MOUTH DAILY       No current facility-administered medications for this visit.        Review of patient's allergies indicates:  No Known Allergies      Review of Systems   Constitution: Negative for chills, fever, weakness and malaise/fatigue.   Cardiovascular: Negative for chest pain, claudication and leg swelling.   Respiratory: Negative for cough and shortness of breath.    Skin: Positive for color change and nail changes. Negative for itching and poor wound healing.   Musculoskeletal: Negative for back pain, joint pain, muscle cramps and muscle weakness.   Gastrointestinal: Negative for nausea and vomiting.   Neurological: Negative for numbness and paresthesias.   Psychiatric/Behavioral: Negative for altered mental status.           Objective:      Physical Exam   Constitutional: She is oriented to person, place, and time. She appears well-developed and well-nourished. No distress.   Cardiovascular:   Pulses:       Dorsalis pedis pulses are 2+ on the right side, and 2+ on the left side.        Posterior tibial pulses are 2+ on the right side, and 2+ on the left side.   CFT< 3 secs all toes bilateral foot, skin temp warm bilateral foot, + digital hair growth bilateral  foot, no lower extremity edema bilateral except for localized to left third toe, plantar fifth met head region left foot and right second toe.     Musculoskeletal:        Feet:    + tailors bunion bilateral foot.    + equinus that reduces with knee bent bilateral.     Neurological: She is alert and oriented to person, place, and time. She has normal strength. No sensory deficit.   Skin: Skin is warm and dry. Capillary refill takes less than 2 seconds. No ecchymosis and no rash noted. She is not diaphoretic. There is erythema. No cyanosis. Nails show no clubbing.   Superficial abrasion with mixed granular and epithelial base that is superficial, no drainage, no odor, does not probe or undermine lateral left fifth met head.             Assessment:       Encounter Diagnoses   Name Primary?    Mucoid cyst of joint - Left Foot Yes    Bursitis of left foot - Left Foot          Plan:       Elisha was seen today for nail problem.    Diagnoses and all orders for this visit:    Mucoid cyst of joint - Left Foot    Bursitis of left foot - Left Foot    Other orders  -     dexamethasone injection 6 mg; Inject 1.5 mLs (6 mg total) into the muscle once.      I counseled the patient on her conditions, their implications and medical management.    Clinically appears to have cellulitis right second toe, mucinous cyst left third toe DIPJ and sub fifth bursa sac.    Discussed conservative care of mucoid cysts such as shoe gear and activity modifications, injections vs surgical excision in detail. With the patient's verbal consent the skin was prepped with alcohol and skin anesthesia achieved with ethyl chloride prior to injecting 2 mg dexamethasone mixed with 0.5 mL 1% plain lidocaine into the cyst/DIPJ left third toe. She tolerated the procedure well without complication. Instructed to rest, ice and elevate as discussed.    Discussed conservative care of painful bursal sacs such as orthotics, OTC inserts, shoe gear/activity  modifications, injections vs surgical excision in detail. With the patient's consent the skin was prepped with alcohol followed by skin anesthesia with ethyl chloride. Injected 4 mg dexamethasone with 1 mL 1% plain lidocaine into the bursa sac described above. She tolerated the procedure well without complication. Instructed to rest, ice and elevate.    Discussed appropriate shoe gear selection and how to appropriately size the toe box.    RTC 3 weeks or prn.  .

## 2018-02-02 ENCOUNTER — PATIENT MESSAGE (OUTPATIENT)
Dept: PODIATRY | Facility: CLINIC | Age: 23
End: 2018-02-02

## 2018-02-15 ENCOUNTER — OFFICE VISIT (OUTPATIENT)
Dept: PODIATRY | Facility: CLINIC | Age: 23
End: 2018-02-15
Payer: COMMERCIAL

## 2018-02-15 VITALS
SYSTOLIC BLOOD PRESSURE: 114 MMHG | DIASTOLIC BLOOD PRESSURE: 78 MMHG | WEIGHT: 130 LBS | HEIGHT: 65 IN | HEART RATE: 97 BPM | BODY MASS INDEX: 21.66 KG/M2

## 2018-02-15 DIAGNOSIS — L60.0 ONYCHOCRYPTOSIS: Primary | ICD-10-CM

## 2018-02-15 DIAGNOSIS — M77.52 BURSITIS OF LEFT FOOT: ICD-10-CM

## 2018-02-15 PROCEDURE — 99999 PR PBB SHADOW E&M-EST. PATIENT-LVL III: CPT | Mod: PBBFAC,,, | Performed by: PODIATRIST

## 2018-02-15 PROCEDURE — 3008F BODY MASS INDEX DOCD: CPT | Mod: S$GLB,,, | Performed by: PODIATRIST

## 2018-02-15 PROCEDURE — 99212 OFFICE O/P EST SF 10 MIN: CPT | Mod: 25,S$GLB,, | Performed by: PODIATRIST

## 2018-02-15 PROCEDURE — 20600 DRAIN/INJ JOINT/BURSA W/O US: CPT | Mod: LT,S$GLB,, | Performed by: PODIATRIST

## 2018-02-15 PROCEDURE — 11730 AVULSION NAIL PLATE SIMPLE 1: CPT | Mod: 51,TA,S$GLB, | Performed by: PODIATRIST

## 2018-02-15 RX ORDER — METHYLPREDNISOLONE ACETATE 40 MG/ML
40 INJECTION, SUSPENSION INTRA-ARTICULAR; INTRALESIONAL; INTRAMUSCULAR; SOFT TISSUE
Status: COMPLETED | OUTPATIENT
Start: 2018-02-15 | End: 2018-02-15

## 2018-02-15 RX ADMIN — METHYLPREDNISOLONE ACETATE 40 MG: 40 INJECTION, SUSPENSION INTRA-ARTICULAR; INTRALESIONAL; INTRAMUSCULAR; SOFT TISSUE at 10:02

## 2018-02-15 NOTE — PROGRESS NOTES
"Subjective:      Patient ID: Elisha Grimes is a 22 y.o. female.    Chief Complaint: Follow-up    Complains of painful left third toe, right second toe and pain under ball of left foot for past several weeks. History of previous ingrown nail treated with partial nail avulsion 6/29/17. Accompanied by her mother. Denies trauma. Relates she exercises a lot especially running. She completed a course of oral antiibiotic that she she can not recall the name > 2 weeks ago prescribed per an C. Wearing slip on shoes today.     2/15/18: Follow up from steroid injection to third toe and bursa sac plantar 5 MTP left foot. Relates pain signficantly improved but still has some mild pain and discoloration to left third toe and mild pain plantar left fifth met head.     Vitals:    02/15/18 1023   BP: 114/78   Pulse: 97   Weight: 59 kg (130 lb)   Height: 5' 5" (1.651 m)   PainSc: 0-No pain      Past Medical History:   Diagnosis Date    Attention deficit hyperactivity disorder (ADHD), combined type     Eczema        No past surgical history on file.    Family History   Problem Relation Age of Onset    No Known Problems Mother     Heart disease Father      passed age 63 Acute MI; had prior cardiac arrest events prior -     Hypertension Father     Hyperlipidemia Father     No Known Problems Brother     Emphysema Maternal Grandmother        Social History     Social History    Marital status: Single     Spouse name: N/A    Number of children: N/A    Years of education: N/A     Occupational History    student      Social History Main Topics    Smoking status: Never Smoker    Smokeless tobacco: Never Used    Alcohol use Yes      Comment: once monthly on average; vodka and water usually 2 drinks on a night she drinks    Drug use: No    Sexual activity: Yes     Partners: Male     Birth control/ protection: OCP     Other Topics Concern    None     Social History Narrative    Going to [employer] - major Biology - with " plans for PA school.       Current Outpatient Prescriptions   Medication Sig Dispense Refill    doxycycline (ORACEA) 40 mg capsule Take 40 mg by mouth once daily.      hydrOXYzine HCl (ATARAX) 10 MG Tab       lisdexamfetamine (VYVANSE) 50 MG capsule Take 1 capsule (50 mg total) by mouth every morning. 30 capsule 0    lisdexamfetamine (VYVANSE) 50 MG capsule Take 1 capsule (50 mg total) by mouth every morning. 30 capsule 0    [START ON 3/13/2018] lisdexamfetamine (VYVANSE) 50 MG capsule Take 1 capsule (50 mg total) by mouth every morning. 30 capsule 0    mupirocin (BACTROBAN) 2 % ointment Apply topically 2 (two) times daily. 22 g 1    naproxen (NAPROSYN) 375 MG tablet Take 375 mg by mouth.      norethindrone-e.estradiol-iron (LO LOESTRIN FE) 1 mg-10 mcg (24)/10 mcg (2) Tab TAKE 1 TABLET BY MOUTH DAILY       No current facility-administered medications for this visit.        Review of patient's allergies indicates:  No Known Allergies      Review of Systems   Constitution: Negative for chills, fever, weakness and malaise/fatigue.   Cardiovascular: Negative for chest pain, claudication and leg swelling.   Respiratory: Negative for cough and shortness of breath.    Skin: Positive for color change and nail changes. Negative for itching and poor wound healing.   Musculoskeletal: Negative for back pain, joint pain, muscle cramps and muscle weakness.   Gastrointestinal: Negative for nausea and vomiting.   Neurological: Negative for numbness and paresthesias.   Psychiatric/Behavioral: Negative for altered mental status.           Objective:      Physical Exam   Constitutional: She is oriented to person, place, and time. She appears well-developed and well-nourished. No distress.   Cardiovascular:   Pulses:       Dorsalis pedis pulses are 2+ on the right side, and 2+ on the left side.        Posterior tibial pulses are 2+ on the right side, and 2+ on the left side.   CFT< 3 secs all toes bilateral foot, skin temp warm  bilateral foot, + digital hair growth bilateral foot, no lower extremity edema bilateral except for localized to left third toe, plantar fifth met head region left foot and right second toe.     Musculoskeletal:        Feet:    + tailors bunion bilateral foot.    + equinus that reduces with knee bent bilateral.     Neurological: She is alert and oriented to person, place, and time. She has normal strength. No sensory deficit.   Skin: Skin is warm, dry and intact. Capillary refill takes less than 2 seconds. No ecchymosis and no rash noted. She is not diaphoretic. There is erythema. No cyanosis. Nails show no clubbing.   Mild localized pain, edema and erythema localized to left third toe medial nail border which is palpated deep to the nail fold. Previous mass no longer palpated left third toe.    Slight to mild resolving erythema right second toe medial nail border.    Previous wound left lateral fifth met head healed.             Assessment:       Encounter Diagnoses   Name Primary?    Onychocryptosis - Left Foot Yes    Bursitis of left foot - Left Foot          Plan:       Elisha was seen today for follow-up.    Diagnoses and all orders for this visit:    Onychocryptosis - Left Foot    Bursitis of left foot - Left Foot    Other orders  -     methylPREDNISolone acetate injection 40 mg; Inject 1 mL (40 mg total) into the muscle one time.      I counseled the patient on her conditions, their implications and medical management.    Clinically bursal sac left plantar fifth met head is resolving with steroid injection.    Procedure: Left third toe medial nail border avulsion  Pathology: none  EBL: < 1 mL  Materials: none  Injectibles: 3 mL 1% plain marcaine  Complications: none    Procedure in detail: Time out called verifying patient, procedure and toe. Skin prepped with alcohol followed by ethyl chloride application and infiltration of 4 mL 1% plain lidocaine into proximal toe. Skin was prepped with betadine. A  sterile tourniquet was applied to the toe. Sterile instrumentation was used to excise the entire nail plate. The tourniquet was released noting instant return of the toe color and capillary fill time was instant. Bacitracin ointment was applied to the wound followed by gauze secured with coban.  Home care instructions reviewed in detail with mupirocin ointment.    The patient tolerated the procedure well without complication.      Discussed conservative care of painful bursal sacs such as orthotics, OTC inserts, shoe gear/activity modifications, injections vs surgical excision in detail. With the patient's consent the skin was prepped with alcohol followed by skin anesthesia with ethyl chloride. Injected 4o mg depomedrol with 1 mL 0.25% plain marcaine into the bursa sac described above. She tolerated the procedure well without complication. Instructed to rest, ice and elevate.    Discussed appropriate shoe gear selection and how to appropriately size the toe box.    RTC 2 weeks or prn.  .

## 2018-04-16 ENCOUNTER — OFFICE VISIT (OUTPATIENT)
Dept: FAMILY MEDICINE | Facility: CLINIC | Age: 23
End: 2018-04-16
Payer: COMMERCIAL

## 2018-04-16 VITALS
HEIGHT: 67 IN | HEART RATE: 70 BPM | OXYGEN SATURATION: 100 % | RESPIRATION RATE: 18 BRPM | WEIGHT: 127.88 LBS | TEMPERATURE: 98 F | DIASTOLIC BLOOD PRESSURE: 62 MMHG | SYSTOLIC BLOOD PRESSURE: 120 MMHG | BODY MASS INDEX: 20.07 KG/M2

## 2018-04-16 DIAGNOSIS — F90.2 ATTENTION DEFICIT HYPERACTIVITY DISORDER (ADHD), COMBINED TYPE: ICD-10-CM

## 2018-04-16 PROCEDURE — 99999 PR PBB SHADOW E&M-EST. PATIENT-LVL IV: CPT | Mod: PBBFAC,,, | Performed by: NURSE PRACTITIONER

## 2018-04-16 PROCEDURE — 99213 OFFICE O/P EST LOW 20 MIN: CPT | Mod: S$GLB,,, | Performed by: NURSE PRACTITIONER

## 2018-04-16 RX ORDER — LISDEXAMFETAMINE DIMESYLATE 50 MG/1
50 CAPSULE ORAL EVERY MORNING
Qty: 30 CAPSULE | Refills: 0 | Status: SHIPPED | OUTPATIENT
Start: 2018-05-18 | End: 2018-04-16 | Stop reason: SDUPTHER

## 2018-04-16 RX ORDER — LISDEXAMFETAMINE DIMESYLATE 50 MG/1
50 CAPSULE ORAL EVERY MORNING
Qty: 30 CAPSULE | Refills: 0 | Status: SHIPPED | OUTPATIENT
Start: 2018-04-19 | End: 2018-04-16 | Stop reason: SDUPTHER

## 2018-04-16 RX ORDER — LISDEXAMFETAMINE DIMESYLATE 50 MG/1
50 CAPSULE ORAL EVERY MORNING
Qty: 30 CAPSULE | Refills: 0 | Status: SHIPPED | OUTPATIENT
Start: 2018-06-18 | End: 2018-08-06 | Stop reason: SDUPTHER

## 2018-04-16 NOTE — PROGRESS NOTES
Subjective:       Patient ID: Elisha Grimes is a 22 y.o. female.    Chief Complaint: ADHD (medication check up)    Patient has ADHD that is well controlled on Vyvanse at present dose.  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        Previous Medications    DOXYCYCLINE (ORACEA) 40 MG CAPSULE    Take 40 mg by mouth once daily.    LISDEXAMFETAMINE (VYVANSE) 50 MG CAPSULE    Take 1 capsule (50 mg total) by mouth every morning.    LISDEXAMFETAMINE (VYVANSE) 50 MG CAPSULE    Take 1 capsule (50 mg total) by mouth every morning.    LISDEXAMFETAMINE (VYVANSE) 50 MG CAPSULE    Take 1 capsule (50 mg total) by mouth every morning.    NORETHINDRONE-E.ESTRADIOL-IRON (LO LOESTRIN FE) 1 MG-10 MCG (24)/10 MCG (2) TAB    TAKE 1 TABLET BY MOUTH DAILY       Past Medical History:   Diagnosis Date    Attention deficit hyperactivity disorder (ADHD), combined type     Eczema        History reviewed. No pertinent surgical history.    Family History   Problem Relation Age of Onset    No Known Problems Mother     Heart disease Father      passed age 63 Acute MI; had prior cardiac arrest events prior -     Hypertension Father     Hyperlipidemia Father     No Known Problems Brother     Emphysema Maternal Grandmother        Social History     Social History    Marital status: Single     Spouse name: N/A    Number of children: N/A    Years of education: N/A     Occupational History    student      Social History Main Topics    Smoking status: Never Smoker    Smokeless tobacco: Never Used    Alcohol use Yes      Comment: once monthly on average; vodka and water usually 2 drinks on a night she drinks    Drug use: No    Sexual activity: Yes     Partners: Male     Birth control/ protection: OCP     Other Topics Concern    None     Social History Narrative    Going to [employer] - major Biology - with plans for PA school.       Review of  "Systems   Constitutional: Negative for appetite change, chills, fatigue, fever and unexpected weight change.   HENT: Negative for congestion, ear pain, mouth sores, nosebleeds, postnasal drip, rhinorrhea, sinus pressure, sneezing, sore throat, trouble swallowing and voice change.    Eyes: Negative for photophobia, pain, discharge, redness, itching and visual disturbance.   Respiratory: Negative for cough, chest tightness and shortness of breath.    Cardiovascular: Negative for chest pain, palpitations and leg swelling.   Gastrointestinal: Negative for abdominal pain, blood in stool, constipation, diarrhea, nausea and vomiting.   Genitourinary: Negative for dysuria, frequency, hematuria and urgency.   Musculoskeletal: Negative for arthralgias, back pain, joint swelling and myalgias.   Skin: Negative for color change and rash.   Allergic/Immunologic: Negative for immunocompromised state.   Neurological: Negative for dizziness, seizures, syncope, weakness and headaches.   Hematological: Negative for adenopathy. Does not bruise/bleed easily.   Psychiatric/Behavioral: Negative for agitation, dysphoric mood, sleep disturbance and suicidal ideas. The patient is not nervous/anxious.          Objective:     Vitals:    04/16/18 1035   BP: 120/62   Pulse: 70   Resp: 18   Temp: 98.1 °F (36.7 °C)   SpO2: 100%   Weight: 58 kg (127 lb 13.9 oz)   Height: 5' 7" (1.702 m)          Physical Exam   Constitutional: She is oriented to person, place, and time. She appears well-developed and well-nourished.   HENT:   Head: Normocephalic and atraumatic.   Right Ear: External ear normal.   Left Ear: External ear normal.   Nose: Nose normal.   Mouth/Throat: Oropharynx is clear and moist. No oropharyngeal exudate.   Eyes: EOM are normal. Pupils are equal, round, and reactive to light.   Neck: Normal range of motion. Neck supple. No tracheal deviation present. No thyromegaly present.   Cardiovascular: Normal rate, regular rhythm and normal " heart sounds.    No murmur heard.  Pulmonary/Chest: Effort normal and breath sounds normal. No respiratory distress.   Abdominal: Soft. She exhibits no distension.   Musculoskeletal: Normal range of motion. She exhibits no edema.   Lymphadenopathy:     She has no cervical adenopathy.   Neurological: She is alert and oriented to person, place, and time. No cranial nerve deficit. Coordination normal.   Skin: Skin is warm and dry.   Psychiatric: She has a normal mood and affect.         Assessment:         ICD-10-CM ICD-9-CM   1. Attention deficit hyperactivity disorder (ADHD), combined type F90.2 314.01       Plan:       Attention deficit hyperactivity disorder (ADHD), combined type  -  Follow up in 3 months.   Go get fasting labs first and then visit for full wellness exam.  -     Discontinue: lisdexamfetamine (VYVANSE) 50 MG capsule; Take 1 capsule (50 mg total) by mouth every morning.  Dispense: 30 capsule; Refill: 0  -     Discontinue: lisdexamfetamine (VYVANSE) 50 MG capsule; Take 1 capsule (50 mg total) by mouth every morning.  Dispense: 30 capsule; Refill: 0  -     lisdexamfetamine (VYVANSE) 50 MG capsule; Take 1 capsule (50 mg total) by mouth every morning.  Dispense: 30 capsule; Refill: 0      Follow-up in about 3 months (around 7/16/2018) for fasting labs and full wellness exam.     Patient's Medications   New Prescriptions    No medications on file   Previous Medications    DOXYCYCLINE (ORACEA) 40 MG CAPSULE    Take 40 mg by mouth once daily.    LISDEXAMFETAMINE (VYVANSE) 50 MG CAPSULE    Take 1 capsule (50 mg total) by mouth every morning.    LISDEXAMFETAMINE (VYVANSE) 50 MG CAPSULE    Take 1 capsule (50 mg total) by mouth every morning.    NORETHINDRONE-E.ESTRADIOL-IRON (LO LOESTRIN FE) 1 MG-10 MCG (24)/10 MCG (2) TAB    TAKE 1 TABLET BY MOUTH DAILY   Modified Medications    Modified Medication Previous Medication    LISDEXAMFETAMINE (VYVANSE) 50 MG CAPSULE lisdexamfetamine (VYVANSE) 50 MG capsule        Take 1 capsule (50 mg total) by mouth every morning.    Take 1 capsule (50 mg total) by mouth every morning.   Discontinued Medications    HYDROXYZINE HCL (ATARAX) 10 MG TAB        NAPROXEN (NAPROSYN) 375 MG TABLET    Take 375 mg by mouth.

## 2018-07-31 ENCOUNTER — LAB VISIT (OUTPATIENT)
Dept: LAB | Facility: HOSPITAL | Age: 23
End: 2018-07-31
Attending: NURSE PRACTITIONER
Payer: COMMERCIAL

## 2018-07-31 ENCOUNTER — TELEPHONE (OUTPATIENT)
Dept: FAMILY MEDICINE | Facility: CLINIC | Age: 23
End: 2018-07-31

## 2018-07-31 DIAGNOSIS — Z13.220 SCREENING FOR LIPOID DISORDERS: ICD-10-CM

## 2018-07-31 DIAGNOSIS — Z13.29 SCREENING FOR THYROID DISORDER: ICD-10-CM

## 2018-07-31 DIAGNOSIS — Z00.01 ENCOUNTER FOR GENERAL ADULT MEDICAL EXAMINATION WITH ABNORMAL FINDINGS: Primary | ICD-10-CM

## 2018-07-31 DIAGNOSIS — Z13.0 SCREENING FOR IRON DEFICIENCY ANEMIA: ICD-10-CM

## 2018-07-31 DIAGNOSIS — Z13.1 SCREENING FOR DIABETES MELLITUS: ICD-10-CM

## 2018-07-31 DIAGNOSIS — Z13.0 SCREENING FOR IRON DEFICIENCY ANEMIA: Primary | ICD-10-CM

## 2018-07-31 LAB
ALBUMIN SERPL BCP-MCNC: 4.5 G/DL
ALP SERPL-CCNC: 52 U/L
ALT SERPL W/O P-5'-P-CCNC: 18 U/L
ANION GAP SERPL CALC-SCNC: 7 MMOL/L
AST SERPL-CCNC: 24 U/L
BASOPHILS # BLD AUTO: 0.03 K/UL
BASOPHILS NFR BLD: 0.6 %
BILIRUB SERPL-MCNC: 0.3 MG/DL
BUN SERPL-MCNC: 9 MG/DL
CALCIUM SERPL-MCNC: 9.4 MG/DL
CHLORIDE SERPL-SCNC: 104 MMOL/L
CHOLEST SERPL-MCNC: 140 MG/DL
CHOLEST/HDLC SERPL: 3 {RATIO}
CO2 SERPL-SCNC: 29 MMOL/L
CREAT SERPL-MCNC: 0.67 MG/DL
DIFFERENTIAL METHOD: NORMAL
EOSINOPHIL # BLD AUTO: 0.3 K/UL
EOSINOPHIL NFR BLD: 5.6 %
ERYTHROCYTE [DISTWIDTH] IN BLOOD BY AUTOMATED COUNT: 12.6 %
EST. GFR  (AFRICAN AMERICAN): >60 ML/MIN/1.73 M^2
EST. GFR  (NON AFRICAN AMERICAN): >60 ML/MIN/1.73 M^2
GLUCOSE SERPL-MCNC: 86 MG/DL
HCT VFR BLD AUTO: 37.3 %
HDLC SERPL-MCNC: 47 MG/DL
HDLC SERPL: 33.6 %
HGB BLD-MCNC: 12.4 G/DL
LDLC SERPL CALC-MCNC: 79.4 MG/DL
LYMPHOCYTES # BLD AUTO: 1.9 K/UL
LYMPHOCYTES NFR BLD: 39.9 %
MCH RBC QN AUTO: 29.4 PG
MCHC RBC AUTO-ENTMCNC: 33.2 G/DL
MCV RBC AUTO: 88 FL
MONOCYTES # BLD AUTO: 0.3 K/UL
MONOCYTES NFR BLD: 7 %
NEUTROPHILS # BLD AUTO: 2.3 K/UL
NEUTROPHILS NFR BLD: 46.7 %
NONHDLC SERPL-MCNC: 93 MG/DL
PLATELET # BLD AUTO: 266 K/UL
PMV BLD AUTO: 10.3 FL
POTASSIUM SERPL-SCNC: 4.1 MMOL/L
PROT SERPL-MCNC: 7.5 G/DL
RBC # BLD AUTO: 4.22 M/UL
SODIUM SERPL-SCNC: 140 MMOL/L
TRIGL SERPL-MCNC: 68 MG/DL
TSH SERPL DL<=0.005 MIU/L-ACNC: 0.56 UIU/ML
WBC # BLD AUTO: 4.84 K/UL

## 2018-07-31 PROCEDURE — 80061 LIPID PANEL: CPT

## 2018-07-31 PROCEDURE — 84443 ASSAY THYROID STIM HORMONE: CPT | Mod: PO

## 2018-07-31 PROCEDURE — 36415 COLL VENOUS BLD VENIPUNCTURE: CPT | Mod: PO

## 2018-07-31 PROCEDURE — 85025 COMPLETE CBC W/AUTO DIFF WBC: CPT | Mod: PO

## 2018-07-31 PROCEDURE — 80053 COMPREHEN METABOLIC PANEL: CPT | Mod: PO

## 2018-07-31 NOTE — TELEPHONE ENCOUNTER
----- Message from Sally Locke sent at 7/31/2018 12:45 PM CDT -----  Contact: Self [phone]  Patient is requesting Lab orders. Please advice

## 2018-07-31 NOTE — TELEPHONE ENCOUNTER
Pt was due to return this month for fasting labs and wellness, need orders in for CBC,TSH, LIPID and CMP

## 2018-08-01 ENCOUNTER — TELEPHONE (OUTPATIENT)
Dept: FAMILY MEDICINE | Facility: CLINIC | Age: 23
End: 2018-08-01

## 2018-08-01 NOTE — TELEPHONE ENCOUNTER
----- Message from Louisa Moreno sent at 8/1/2018  1:45 PM CDT -----  Contact: [phone]/self  Pt requesting to speak with you concerning rescheduling her appointment  Please call and advise

## 2018-08-02 ENCOUNTER — APPOINTMENT (RX ONLY)
Dept: URBAN - METROPOLITAN AREA CLINIC 98 | Facility: CLINIC | Age: 23
Setting detail: DERMATOLOGY
End: 2018-08-02

## 2018-08-02 DIAGNOSIS — L20.89 OTHER ATOPIC DERMATITIS: ICD-10-CM

## 2018-08-02 DIAGNOSIS — L70.0 ACNE VULGARIS: ICD-10-CM

## 2018-08-02 PROBLEM — L20.84 INTRINSIC (ALLERGIC) ECZEMA: Status: ACTIVE | Noted: 2018-08-02

## 2018-08-02 PROCEDURE — ? PRESCRIPTION

## 2018-08-02 PROCEDURE — ? COUNSELING

## 2018-08-02 PROCEDURE — 99203 OFFICE O/P NEW LOW 30 MIN: CPT

## 2018-08-02 PROCEDURE — ? TREATMENT REGIMEN

## 2018-08-02 RX ORDER — CLOBETASOL PROPIONATE 0.5 MG/G
OINTMENT TOPICAL
Qty: 1 | Refills: 1 | Status: ERX | COMMUNITY
Start: 2018-08-02

## 2018-08-02 RX ORDER — MINOCYCLINE HYDROCHLORIDE 90 MG/1
CAPSULE, EXTENDED RELEASE ORAL QDAY
Qty: 30 | Refills: 1 | Status: ERX | COMMUNITY
Start: 2018-08-02

## 2018-08-02 RX ORDER — CLINDAMYCIN PHOSPHATE AND BENZOYL PEROXIDE 10; 50 MG/G; MG/G
GEL TOPICAL
Qty: 1 | Refills: 3 | Status: ERX | COMMUNITY
Start: 2018-08-02

## 2018-08-02 RX ORDER — SPIRONOLACTONE 100 MG/1
TABLET, FILM COATED ORAL QDAY
Qty: 30 | Refills: 6 | Status: ERX | COMMUNITY
Start: 2018-08-02

## 2018-08-02 RX ADMIN — MINOCYCLINE HYDROCHLORIDE: 90 CAPSULE, EXTENDED RELEASE ORAL at 21:23

## 2018-08-02 RX ADMIN — SPIRONOLACTONE: 100 TABLET, FILM COATED ORAL at 21:25

## 2018-08-02 RX ADMIN — CLOBETASOL PROPIONATE: 0.5 OINTMENT TOPICAL at 21:33

## 2018-08-02 RX ADMIN — CLINDAMYCIN PHOSPHATE AND BENZOYL PEROXIDE: 10; 50 GEL TOPICAL at 21:22

## 2018-08-02 NOTE — PROCEDURE: COUNSELING
Isotretinoin Counseling: Patient should get monthly blood tests, not donate blood, not drive at night if vision affected, not share medication, and not undergo elective surgery for 6 months after tx completed. Side effects reviewed, pt to contact office should one occur.
Topical Sulfur Applications Counseling: Topical Sulfur Counseling: Patient counseled that this medication may cause skin irritation or allergic reactions.  In the event of skin irritation, the patient was advised to reduce the amount of the drug applied or use it less frequently.   The patient verbalized understanding of the proper use and possible adverse effects of topical sulfur application.  All of the patient's questions and concerns were addressed.
Birth Control Pills Counseling: Birth Control Pill Counseling: I discussed with the patient the potential side effects of OCPs including but not limited to increased risk of stroke, heart attack, thrombophlebitis, deep venous thrombosis, hepatic adenomas, breast changes, GI upset, headaches, and depression.  The patient verbalized understanding of the proper use and possible adverse effects of OCPs. All of the patient's questions and concerns were addressed.
High Dose Vitamin A Pregnancy And Lactation Text: High dose vitamin A therapy is contraindicated during pregnancy and breast feeding.
Isotretinoin Pregnancy And Lactation Text: This medication is Pregnancy Category X and is considered extremely dangerous during pregnancy. It is unknown if it is excreted in breast milk.
Erythromycin Pregnancy And Lactation Text: This medication is Pregnancy Category B and is considered safe during pregnancy. It is also excreted in breast milk.
Tetracycline Pregnancy And Lactation Text: This medication is Pregnancy Category D and not consider safe during pregnancy. It is also excreted in breast milk.
Tazorac Counseling:  Patient advised that medication is irritating and drying.  Patient may need to apply sparingly and wash off after an hour before eventually leaving it on overnight.  The patient verbalized understanding of the proper use and possible adverse effects of tazorac.  All of the patient's questions and concerns were addressed.
Tazorac Pregnancy And Lactation Text: This medication is not safe during pregnancy. It is unknown if this medication is excreted in breast milk.
Topical Retinoid counseling:  Patient advised to apply a pea-sized amount only at bedtime and wait 30 minutes after washing their face before applying.  If too drying, patient may add a non-comedogenic moisturizer. The patient verbalized understanding of the proper use and possible adverse effects of retinoids.  All of the patient's questions and concerns were addressed.
Topical Sulfur Applications Pregnancy And Lactation Text: This medication is Pregnancy Category C and has an unknown safety profile during pregnancy. It is unknown if this topical medication is excreted in breast milk.
High Dose Vitamin A Counseling: Side effects reviewed, pt to contact office should one occur.
Azithromycin Counseling:  I discussed with the patient the risks of azithromycin including but not limited to GI upset, allergic reaction, drug rash, diarrhea, and yeast infections.
Benzoyl Peroxide Counseling: Patient counseled that medicine may cause skin irritation and bleach clothing.  In the event of skin irritation, the patient was advised to reduce the amount of the drug applied or use it less frequently.   The patient verbalized understanding of the proper use and possible adverse effects of benzoyl peroxide.  All of the patient's questions and concerns were addressed.
Include Pregnancy/Lactation Warning?: No
Detail Level: Detailed
Dapsone Pregnancy And Lactation Text: This medication is Pregnancy Category C and is not considered safe during pregnancy or breast feeding.
Topical Clindamycin Pregnancy And Lactation Text: This medication is Pregnancy Category B and is considered safe during pregnancy. It is unknown if it is excreted in breast milk.
Minocycline Counseling: Patient advised regarding possible photosensitivity and discoloration of the teeth, skin, lips, tongue and gums.  Patient instructed to avoid sunlight, if possible.  When exposed to sunlight, patients should wear protective clothing, sunglasses, and sunscreen.  The patient was instructed to call the office immediately if the following severe adverse effects occur:  hearing changes, easy bruising/bleeding, severe headache, or vision changes.  The patient verbalized understanding of the proper use and possible adverse effects of minocycline.  All of the patient's questions and concerns were addressed.
Topical Clindamycin Counseling: Patient counseled that this medication may cause skin irritation or allergic reactions.  In the event of skin irritation, the patient was advised to reduce the amount of the drug applied or use it less frequently.   The patient verbalized understanding of the proper use and possible adverse effects of clindamycin.  All of the patient's questions and concerns were addressed.
Spironolactone Counseling: Patient advised regarding risks of diarrhea, abdominal pain, hyperkalemia, birth defects (for female patients), liver toxicity and renal toxicity. The patient may need blood work to monitor liver and kidney function and potassium levels while on therapy. The patient verbalized understanding of the proper use and possible adverse effects of spironolactone.  All of the patient's questions and concerns were addressed.
Bactrim Counseling:  I discussed with the patient the risks of sulfa antibiotics including but not limited to GI upset, allergic reaction, drug rash, diarrhea, dizziness, photosensitivity, and yeast infections.  Rarely, more serious reactions can occur including but not limited to aplastic anemia, agranulocytosis, methemoglobinemia, blood dyscrasias, liver or kidney failure, lung infiltrates or desquamative/blistering drug rashes.
Topical Retinoid Pregnancy And Lactation Text: This medication is Pregnancy Category C. It is unknown if this medication is excreted in breast milk.
Azithromycin Pregnancy And Lactation Text: This medication is considered safe during pregnancy and is also secreted in breast milk.
Birth Control Pills Pregnancy And Lactation Text: This medication should be avoided if pregnant and for the first 30 days post-partum.
Erythromycin Counseling:  I discussed with the patient the risks of erythromycin including but not limited to GI upset, allergic reaction, drug rash, diarrhea, increase in liver enzymes, and yeast infections.
Tetracycline Counseling: Patient counseled regarding possible photosensitivity and increased risk for sunburn.  Patient instructed to avoid sunlight, if possible.  When exposed to sunlight, patients should wear protective clothing, sunglasses, and sunscreen.  The patient was instructed to call the office immediately if the following severe adverse effects occur:  hearing changes, easy bruising/bleeding, severe headache, or vision changes.  The patient verbalized understanding of the proper use and possible adverse effects of tetracycline.  All of the patient's questions and concerns were addressed. Patient understands to avoid pregnancy while on therapy due to potential birth defects.
Doxycycline Counseling:  Patient counseled regarding possible photosensitivity and increased risk for sunburn.  Patient instructed to avoid sunlight, if possible.  When exposed to sunlight, patients should wear protective clothing, sunglasses, and sunscreen.  The patient was instructed to call the office immediately if the following severe adverse effects occur:  hearing changes, easy bruising/bleeding, severe headache, or vision changes.  The patient verbalized understanding of the proper use and possible adverse effects of doxycycline.  All of the patient's questions and concerns were addressed.
Benzoyl Peroxide Pregnancy And Lactation Text: This medication is Pregnancy Category C. It is unknown if benzoyl peroxide is excreted in breast milk.
Spironolactone Pregnancy And Lactation Text: This medication can cause feminization of the male fetus and should be avoided during pregnancy. The active metabolite is also found in breast milk.
Dapsone Counseling: I discussed with the patient the risks of dapsone including but not limited to hemolytic anemia, agranulocytosis, rashes, methemoglobinemia, kidney failure, peripheral neuropathy, headaches, GI upset, and liver toxicity.  Patients who start dapsone require monitoring including baseline LFTs and weekly CBCs for the first month, then every month thereafter.  The patient verbalized understanding of the proper use and possible adverse effects of dapsone.  All of the patient's questions and concerns were addressed.
Bactrim Pregnancy And Lactation Text: This medication is Pregnancy Category D and is known to cause fetal risk.  It is also excreted in breast milk.
Doxycycline Pregnancy And Lactation Text: This medication is Pregnancy Category D and not consider safe during pregnancy. It is also excreted in breast milk but is considered safe for shorter treatment courses.

## 2018-08-02 NOTE — PROCEDURE: TREATMENT REGIMEN
Initiate Treatment: Clindamycin + BPO gel QAM spot treatment.\\nMNC 90 mg Qday\\nSpironolactone 100mg QDay
Detail Level: Zone
Continue Regimen: Gentle cleanser \\nCeraVe all over daily after bathing while skin is still damp\\nBetamethasone ointment BID to affected areas until tube runs out. Start Triamcinolone 0.1% cream BID

## 2018-08-06 ENCOUNTER — OFFICE VISIT (OUTPATIENT)
Dept: FAMILY MEDICINE | Facility: CLINIC | Age: 23
End: 2018-08-06
Payer: COMMERCIAL

## 2018-08-06 VITALS
HEIGHT: 67 IN | SYSTOLIC BLOOD PRESSURE: 110 MMHG | BODY MASS INDEX: 21.19 KG/M2 | DIASTOLIC BLOOD PRESSURE: 60 MMHG | RESPIRATION RATE: 16 BRPM | OXYGEN SATURATION: 99 % | HEART RATE: 86 BPM | WEIGHT: 135 LBS | TEMPERATURE: 98 F

## 2018-08-06 DIAGNOSIS — F90.2 ATTENTION DEFICIT HYPERACTIVITY DISORDER (ADHD), COMBINED TYPE: Primary | ICD-10-CM

## 2018-08-06 DIAGNOSIS — R00.2 PALPITATIONS: ICD-10-CM

## 2018-08-06 PROBLEM — L70.0 ACNE VULGARIS: Status: ACTIVE | Noted: 2018-08-06

## 2018-08-06 PROCEDURE — 99999 PR PBB SHADOW E&M-EST. PATIENT-LVL III: CPT | Mod: PBBFAC,,, | Performed by: FAMILY MEDICINE

## 2018-08-06 PROCEDURE — 3008F BODY MASS INDEX DOCD: CPT | Mod: CPTII,S$GLB,, | Performed by: FAMILY MEDICINE

## 2018-08-06 PROCEDURE — 99213 OFFICE O/P EST LOW 20 MIN: CPT | Mod: S$GLB,,, | Performed by: FAMILY MEDICINE

## 2018-08-06 RX ORDER — SPIRONOLACTONE 100 MG/1
100 TABLET, FILM COATED ORAL DAILY
Refills: 6 | COMMUNITY
Start: 2018-08-03 | End: 2018-11-27

## 2018-08-06 RX ORDER — LISDEXAMFETAMINE DIMESYLATE 50 MG/1
50 CAPSULE ORAL EVERY MORNING
Qty: 30 CAPSULE | Refills: 0 | Status: SHIPPED | OUTPATIENT
Start: 2018-08-06 | End: 2018-08-06 | Stop reason: SDUPTHER

## 2018-08-06 RX ORDER — LISDEXAMFETAMINE DIMESYLATE 50 MG/1
50 CAPSULE ORAL EVERY MORNING
Qty: 30 CAPSULE | Refills: 0 | Status: SHIPPED | OUTPATIENT
Start: 2018-08-06 | End: 2018-11-27 | Stop reason: SDUPTHER

## 2018-08-06 NOTE — ASSESSMENT & PLAN NOTE
- stable  - continue same meds  - monitor for further episodes of tachycardia and pt instructed to notify me if it recurs  - 3 Rx's Vyvanse 50 mg daily given to pt

## 2018-08-06 NOTE — PROGRESS NOTES
FAMILY MEDICINE    Patient Active Problem List   Diagnosis    Attention deficit hyperactivity disorder (ADHD), combined type    Eczema    Acne vulgaris       CC:   Chief Complaint   Patient presents with    Results     lab results from 7/31/18    Medication Refill       SUBJECTIVE:  Elisha Grimes   is a 22 y.o. female  - here for ADHD follow-up and refill of Vyvanse 50 mg daily. Prescribing provider Naila Smith is out of the office. Started medication as a Sophomore in high school and trialed several medications. Has been stable on Vyvanse for some time and pleased. No unwanted side effects.   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       Medication Refill   This is a chronic problem. Pertinent negatives include no abdominal pain, anorexia, arthralgias, change in bowel habit, chest pain, diaphoresis, fatigue, fever, headaches, joint swelling, nausea, neck pain, vertigo, vomiting or weakness.       ROS: Review of Systems   Constitutional: Negative for activity change, diaphoresis, fatigue, fever and unexpected weight change.   HENT: Negative for hearing loss, rhinorrhea and trouble swallowing.    Eyes: Negative for discharge and visual disturbance.   Respiratory: Negative for chest tightness and wheezing.    Cardiovascular: Positive for palpitations (1 episode lasting 5 mins about 1-2 weeks ago s/p swimming an sittng to eat lunch.  regular. with mild nausea and lightheadedness. no recurrence and no prior simialr issues). Negative for chest pain.   Gastrointestinal: Negative for abdominal pain, anorexia, blood in stool, change in bowel habit, constipation, diarrhea, nausea and vomiting.   Endocrine: Negative for polydipsia and polyuria.   Genitourinary: Negative for difficulty urinating, dysuria, hematuria and menstrual problem.   Musculoskeletal: Negative for arthralgias, joint swelling and neck pain.   Neurological: Negative for  "vertigo, weakness and headaches.   Psychiatric/Behavioral: Negative for confusion and dysphoric mood.       Past Medical History:   Diagnosis Date    Attention deficit hyperactivity disorder (ADHD), combined type     Eczema        History reviewed. No pertinent surgical history.    ALLERGIES: Review of patient's allergies indicates:  No Known Allergies    MEDS:   Current Outpatient Prescriptions:     norethindrone-e.estradiol-iron (LO LOESTRIN FE) 1 mg-10 mcg (24)/10 mcg (2) Tab, TAKE 1 TABLET BY MOUTH DAILY, Disp: , Rfl:     spironolactone (ALDACTONE) 100 MG tablet, Take 100 mg by mouth once daily., Disp: , Rfl: 6    lisdexamfetamine (VYVANSE) 50 MG capsule, Take 1 capsule (50 mg total) by mouth every morning., Disp: 30 capsule, Rfl: 0    OBJECTIVE:   Vitals:    08/06/18 0829   BP: 110/60   BP Location: Left arm   Patient Position: Sitting   BP Method: Medium (Manual)   Pulse: 86   Resp: 16   Temp: 98.4 °F (36.9 °C)   TempSrc: Oral   SpO2: 99%   Weight: 61.2 kg (135 lb)   Height: 5' 7" (1.702 m)       Physical Exam   Constitutional: No distress.   Neck: Neck supple.   Cardiovascular: Normal rate, regular rhythm, normal heart sounds and intact distal pulses.  Exam reveals no gallop.    No murmur heard.  Pulmonary/Chest: Effort normal and breath sounds normal.   Musculoskeletal: She exhibits no edema.   Neurological: She is alert.   Skin: Skin is warm.   Psychiatric: She has a normal mood and affect. Her speech is normal and behavior is normal. Judgment and thought content normal.         PERTINENT RESULTS:   Lab Visit on 07/31/2018   Component Date Value Ref Range Status    WBC 07/31/2018 4.84  3.90 - 12.70 K/uL Final    RBC 07/31/2018 4.22  4.00 - 5.40 M/uL Final    Hemoglobin 07/31/2018 12.4  12.0 - 16.0 g/dL Final    Hematocrit 07/31/2018 37.3  37.0 - 48.5 % Final    MCV 07/31/2018 88  82 - 98 fL Final    MCH 07/31/2018 29.4  27.0 - 31.0 pg Final    MCHC 07/31/2018 33.2  32.0 - 36.0 g/dL Final    RDW " 07/31/2018 12.6  11.5 - 14.5 % Final    Platelets 07/31/2018 266  150 - 350 K/uL Final    MPV 07/31/2018 10.3  9.2 - 12.9 fL Final    Gran # (ANC) 07/31/2018 2.3  1.8 - 7.7 K/uL Final    Lymph # 07/31/2018 1.9  1.0 - 4.8 K/uL Final    Mono # 07/31/2018 0.3  0.3 - 1.0 K/uL Final    Eos # 07/31/2018 0.3  0.0 - 0.5 K/uL Final    Baso # 07/31/2018 0.03  0.00 - 0.20 K/uL Final    Gran% 07/31/2018 46.7  38.0 - 73.0 % Final    Lymph% 07/31/2018 39.9  18.0 - 48.0 % Final    Mono% 07/31/2018 7.0  4.0 - 15.0 % Final    Eosinophil% 07/31/2018 5.6  0.0 - 8.0 % Final    Basophil% 07/31/2018 0.6  0.0 - 1.9 % Final    Differential Method 07/31/2018 Automated   Final    Sodium 07/31/2018 140  136 - 145 mmol/L Final    Potassium 07/31/2018 4.1  3.5 - 5.1 mmol/L Final    Chloride 07/31/2018 104  95 - 110 mmol/L Final    CO2 07/31/2018 29  23 - 29 mmol/L Final    Glucose 07/31/2018 86  70 - 110 mg/dL Final    BUN, Bld 07/31/2018 9  7 - 17 mg/dL Final    Creatinine 07/31/2018 0.67  0.50 - 1.40 mg/dL Final    Calcium 07/31/2018 9.4  8.7 - 10.5 mg/dL Final    Total Protein 07/31/2018 7.5  6.0 - 8.4 g/dL Final    Albumin 07/31/2018 4.5  3.5 - 5.2 g/dL Final    Total Bilirubin 07/31/2018 0.3  0.1 - 1.0 mg/dL Final    Comment: For infants and newborns, interpretation of results should be based  on gestational age, weight and in agreement with clinical  observations.  Premature Infant recommended reference ranges:  Up to 24 hours.............<8.0 mg/dL  Up to 48 hours............<12.0 mg/dL  3-5 days..................<15.0 mg/dL  6-29 days.................<15.0 mg/dL      Alkaline Phosphatase 07/31/2018 52  38 - 126 U/L Final    AST 07/31/2018 24  15 - 46 U/L Final    ALT 07/31/2018 18  10 - 44 U/L Final    Anion Gap 07/31/2018 7* 8 - 16 mmol/L Final    eGFR if African American 07/31/2018 >60.0  >60 mL/min/1.73 m^2 Final    eGFR if non African American 07/31/2018 >60.0  >60 mL/min/1.73 m^2 Final    Comment:  Calculation used to obtain the estimated glomerular filtration  rate (eGFR) is the CKD-EPI equation.       TSH 07/31/2018 0.558  0.400 - 4.000 uIU/mL Final    Comment: Warning:  Heterophilic antibodies in serum or plasma of   certain individuals are known to cause interference with   immunoassays. These antibodies may be present in blood samples   from individuals regularly exposed to animal or who have been   treated with animal products.       Cholesterol 07/31/2018 140  120 - 199 mg/dL Final    Comment: The National Cholesterol Education Program (NCEP) has set the  following guidelines (reference ranges) for Cholesterol:  Optimal.....................<200 mg/dL  Borderline High.............200-239 mg/dL  High........................> or = 240 mg/dL      Triglycerides 07/31/2018 68  30 - 150 mg/dL Final    Comment: The National Cholesterol Education Program (NCEP) has set the  following guidelines (reference values) for triglycerides:  Normal......................<150 mg/dL  Borderline High.............150-199 mg/dL  High........................200-499 mg/dL      HDL 07/31/2018 47  40 - 75 mg/dL Final    Comment: The National Cholesterol Education Program (NCEP) has set the  following guidelines (reference values) for HDL Cholesterol:  Low...............<40 mg/dL  Optimal...........>60 mg/dL      LDL Cholesterol 07/31/2018 79.4  63.0 - 159.0 mg/dL Final    Comment: The National Cholesterol Education Program (NCEP) has set the  following guidelines (reference values) for LDL Cholesterol:  Optimal.......................<130 mg/dL  Borderline High...............130-159 mg/dL  High..........................160-189 mg/dL  Very High.....................>190 mg/dL      HDL/Chol Ratio 07/31/2018 33.6  20.0 - 50.0 % Final    Total Cholesterol/HDL Ratio 07/31/2018 3.0  2.0 - 5.0 Final    Non-HDL Cholesterol 07/31/2018 93  mg/dL Final    Comment: Risk category and Non-HDL cholesterol goals:  Coronary heart disease  (CHD)or equivalent (10-year risk of CHD >20%):  Non-HDL cholesterol goal     <130 mg/dL  Two or more CHD risk factors and 10-year risk of CHD <= 20%:  Non-HDL cholesterol goal     <160 mg/dL  0 to 1 CHD risk factor:  Non-HDL cholesterol goal     <190 mg/dL         ASSESSMENT:  Problem List Items Addressed This Visit     Attention deficit hyperactivity disorder (ADHD), combined type - Primary    Current Assessment & Plan     - stable  - continue same meds  - monitor for further episodes of tachycardia and pt instructed to notify me if it recurs  - 3 Rx's Vyvanse 50 mg daily given to pt         Relevant Medications    lisdexamfetamine (VYVANSE) 50 MG capsule      Other Visit Diagnoses     Palpitations        - possible hypovolemia since not on diurectic vs vasovagal  - rec montior and if recurs will consider Holter          PLAN:   Orders Placed This Encounter    lisdexamfetamine (VYVANSE) 50 MG capsule       Follow-up with Naila Smith  in 3 months.     Dr. Florinda Brown D.O.   Family Medicine

## 2018-10-08 ENCOUNTER — APPOINTMENT (RX ONLY)
Dept: URBAN - METROPOLITAN AREA CLINIC 98 | Facility: CLINIC | Age: 23
Setting detail: DERMATOLOGY
End: 2018-10-08

## 2018-10-08 DIAGNOSIS — L20.89 OTHER ATOPIC DERMATITIS: ICD-10-CM

## 2018-10-08 DIAGNOSIS — L70.0 ACNE VULGARIS: ICD-10-CM | Status: INADEQUATELY CONTROLLED

## 2018-10-08 DIAGNOSIS — D22 MELANOCYTIC NEVI: ICD-10-CM

## 2018-10-08 PROBLEM — D22.72 MELANOCYTIC NEVI OF LEFT LOWER LIMB, INCLUDING HIP: Status: ACTIVE | Noted: 2018-10-08

## 2018-10-08 PROBLEM — D22.5 MELANOCYTIC NEVI OF TRUNK: Status: ACTIVE | Noted: 2018-10-08

## 2018-10-08 PROBLEM — D22.71 MELANOCYTIC NEVI OF RIGHT LOWER LIMB, INCLUDING HIP: Status: ACTIVE | Noted: 2018-10-08

## 2018-10-08 PROCEDURE — ? URINE PREGNANCY TEST

## 2018-10-08 PROCEDURE — 81025 URINE PREGNANCY TEST: CPT

## 2018-10-08 PROCEDURE — ? PRESCRIPTION

## 2018-10-08 PROCEDURE — ? COUNSELING: ISOTRETINOIN

## 2018-10-08 PROCEDURE — ? ORDER TESTS

## 2018-10-08 PROCEDURE — 99214 OFFICE O/P EST MOD 30 MIN: CPT

## 2018-10-08 PROCEDURE — ? TREATMENT REGIMEN

## 2018-10-08 PROCEDURE — ? COUNSELING

## 2018-10-08 RX ORDER — SPIRONOLACTONE 50 MG/1
TABLET, FILM COATED ORAL
Qty: 90 | Refills: 1 | Status: ERX | COMMUNITY
Start: 2018-10-08

## 2018-10-08 RX ORDER — DAPSONE 75 MG/G
GEL TOPICAL
Qty: 1 | Refills: 3 | Status: ERX | COMMUNITY
Start: 2018-10-08

## 2018-10-08 RX ADMIN — SPIRONOLACTONE: 50 TABLET, FILM COATED ORAL at 19:12

## 2018-10-08 RX ADMIN — DAPSONE: 75 GEL TOPICAL at 19:13

## 2018-10-08 ASSESSMENT — LOCATION SIMPLE DESCRIPTION DERM
LOCATION SIMPLE: RIGHT CHEEK
LOCATION SIMPLE: LEFT PLANTAR SURFACE
LOCATION SIMPLE: RIGHT LOWER BACK
LOCATION SIMPLE: LEFT CHEEK
LOCATION SIMPLE: PLANTAR SURFACE OF RIGHT 2ND TOE
LOCATION SIMPLE: CHIN

## 2018-10-08 ASSESSMENT — LOCATION ZONE DERM
LOCATION ZONE: FEET
LOCATION ZONE: TOE
LOCATION ZONE: FACE
LOCATION ZONE: TRUNK

## 2018-10-08 ASSESSMENT — LOCATION DETAILED DESCRIPTION DERM
LOCATION DETAILED: LEFT SUPERIOR LATERAL BUCCAL CHEEK
LOCATION DETAILED: LEFT PLANTAR FOREFOOT OVERLYING 3RD METATARSAL
LOCATION DETAILED: RIGHT CENTRAL MALAR CHEEK
LOCATION DETAILED: RIGHT INFERIOR MEDIAL MIDBACK
LOCATION DETAILED: LEFT CHIN
LOCATION DETAILED: RIGHT SUPERIOR LATERAL LOWER BACK
LOCATION DETAILED: LEFT CENTRAL MALAR CHEEK
LOCATION DETAILED: RIGHT CENTRAL BUCCAL CHEEK
LOCATION DETAILED: RIGHT MEDIAL PLANTAR 2ND TOE

## 2018-10-08 NOTE — PROCEDURE: TREATMENT REGIMEN
Detail Level: Zone
Initiate Treatment: Aczone 7.5% pump in the mornings
Modify Regimen: Spirinolactone 50mg tablets take 2 pill in the mornings and one pill in the evenings.
Discontinue Regimen: Acanya gel pump in the mornings
Otc Regimen: CeraVe cream BID\\nTake Zyrtec daily

## 2018-10-08 NOTE — PROCEDURE: ORDER TESTS
Performing Laboratory: [id number]
Expected Date Of Service: 10/08/2018
Billing Type: Third-Party Bill
Lab Facility: [id number]
Bill For Surgical Tray: no

## 2018-11-06 ENCOUNTER — APPOINTMENT (RX ONLY)
Dept: URBAN - METROPOLITAN AREA CLINIC 98 | Facility: CLINIC | Age: 23
Setting detail: DERMATOLOGY
End: 2018-11-06

## 2018-11-06 DIAGNOSIS — L20.89 OTHER ATOPIC DERMATITIS: ICD-10-CM

## 2018-11-06 DIAGNOSIS — K13.0 DISEASES OF LIPS: ICD-10-CM

## 2018-11-06 DIAGNOSIS — L70.0 ACNE VULGARIS: ICD-10-CM

## 2018-11-06 PROCEDURE — ? TREATMENT REGIMEN

## 2018-11-06 PROCEDURE — ? PRESCRIPTION

## 2018-11-06 PROCEDURE — 99213 OFFICE O/P EST LOW 20 MIN: CPT

## 2018-11-06 PROCEDURE — ? COUNSELING

## 2018-11-06 RX ORDER — NYSTATIN 100000 [USP'U]/G
OINTMENT TOPICAL
Qty: 1 | Refills: 6 | Status: ERX | COMMUNITY
Start: 2018-11-06

## 2018-11-06 RX ORDER — TACROLIMUS 1 MG/G
OINTMENT TOPICAL
Qty: 1 | Refills: 6 | Status: ERX | COMMUNITY
Start: 2018-11-06

## 2018-11-06 RX ADMIN — TACROLIMUS: 1 OINTMENT TOPICAL at 17:14

## 2018-11-06 RX ADMIN — NYSTATIN: 100000 OINTMENT TOPICAL at 17:15

## 2018-11-06 ASSESSMENT — LOCATION SIMPLE DESCRIPTION DERM
LOCATION SIMPLE: RIGHT CHEEK
LOCATION SIMPLE: RIGHT LIP
LOCATION SIMPLE: LEFT CHEEK
LOCATION SIMPLE: INFERIOR FOREHEAD
LOCATION SIMPLE: LEFT LIP

## 2018-11-06 ASSESSMENT — LOCATION DETAILED DESCRIPTION DERM
LOCATION DETAILED: RIGHT INFERIOR VERMILION LIP
LOCATION DETAILED: INFERIOR MID FOREHEAD
LOCATION DETAILED: LEFT CENTRAL MALAR CHEEK
LOCATION DETAILED: LEFT INFERIOR CENTRAL MALAR CHEEK
LOCATION DETAILED: LEFT INFERIOR MEDIAL MALAR CHEEK
LOCATION DETAILED: RIGHT SUPERIOR MEDIAL BUCCAL CHEEK
LOCATION DETAILED: RIGHT INFERIOR CENTRAL MALAR CHEEK
LOCATION DETAILED: LEFT SUPERIOR VERMILION LIP
LOCATION DETAILED: RIGHT CENTRAL MALAR CHEEK

## 2018-11-06 ASSESSMENT — LOCATION ZONE DERM
LOCATION ZONE: FACE
LOCATION ZONE: LIP

## 2018-11-06 ASSESSMENT — SEVERITY ASSESSMENT OVERALL AMONG ALL PATIENTS
IN YOUR EXPERIENCE, AMONG ALL PATIENTS YOU HAVE SEEN WITH THIS CONDITION, HOW SEVERE IS THIS PATIENT'S CONDITION?: FEW INFLAMMATORY LESIONS, SOME NONINFLAMMATORY

## 2018-11-06 NOTE — PROCEDURE: TREATMENT REGIMEN
Initiate Treatment: Protopic 0.1% oint Twice daily
Plan: Good Rx .con if Protopic is not covered \\nAvoid sun\\nKeep house cool\\nNO NAIL POLISH
Samples Given: Vaniply ointment \\nCeraVe healing oint \\nAveeno hydrating gel cleanser
Detail Level: Zone
Continue Regimen: Benadryl 25mg nightly
Otc Regimen: Rinse with water QAM then apply Protopic 0.1% ointment \\nVanicream on normal skin twice daily \\nGentle cleanser in evening
Discontinue Regimen: Sal acid cleanser \\nAczone 7.5% gel
Continue Regimen: Spirolactone 50 mg 2 pills QAM and 1 pill nightly

## 2018-11-19 ENCOUNTER — PATIENT MESSAGE (OUTPATIENT)
Dept: FAMILY MEDICINE | Facility: CLINIC | Age: 23
End: 2018-11-19

## 2018-11-19 ENCOUNTER — APPOINTMENT (RX ONLY)
Dept: URBAN - METROPOLITAN AREA CLINIC 98 | Facility: CLINIC | Age: 23
Setting detail: DERMATOLOGY
End: 2018-11-19

## 2018-11-19 DIAGNOSIS — L70.0 ACNE VULGARIS: ICD-10-CM

## 2018-11-19 DIAGNOSIS — L72.0 EPIDERMAL CYST: ICD-10-CM

## 2018-11-19 DIAGNOSIS — L20.89 OTHER ATOPIC DERMATITIS: ICD-10-CM

## 2018-11-19 PROCEDURE — ? TREATMENT REGIMEN

## 2018-11-19 PROCEDURE — ? PRESCRIPTION

## 2018-11-19 PROCEDURE — ? COUNSELING

## 2018-11-19 PROCEDURE — 99213 OFFICE O/P EST LOW 20 MIN: CPT

## 2018-11-19 RX ORDER — SPIRONOLACTONE 50 MG/1
TABLET, FILM COATED ORAL
Qty: 60 | Refills: 11 | Status: ERX

## 2018-11-19 ASSESSMENT — LOCATION DETAILED DESCRIPTION DERM
LOCATION DETAILED: RIGHT LATERAL TRAPEZIAL NECK
LOCATION DETAILED: LEFT SUPERIOR LATERAL BUCCAL CHEEK
LOCATION DETAILED: LEFT INFERIOR MEDIAL MALAR CHEEK

## 2018-11-19 ASSESSMENT — SEVERITY ASSESSMENT OVERALL AMONG ALL PATIENTS
IN YOUR EXPERIENCE, AMONG ALL PATIENTS YOU HAVE SEEN WITH THIS CONDITION, HOW SEVERE IS THIS PATIENT'S CONDITION?: ALMOST CLEAR

## 2018-11-19 ASSESSMENT — LOCATION SIMPLE DESCRIPTION DERM
LOCATION SIMPLE: LEFT CHEEK
LOCATION SIMPLE: POSTERIOR NECK

## 2018-11-19 ASSESSMENT — LOCATION ZONE DERM
LOCATION ZONE: FACE
LOCATION ZONE: NECK

## 2018-11-19 ASSESSMENT — SEVERITY ASSESSMENT: SEVERITY: CLEAR

## 2018-11-19 NOTE — PROCEDURE: TREATMENT REGIMEN
Plan: No makeup x1wk then start reading back old products x 1 product per wk \\nNo nail polish until January then if desired restart on toes
Modify Regimen: Stop Protopic, restart PRN for flares
Continue Regimen: Vaniply QD to lips
Detail Level: Zone
Initiate Treatment: Restart spironolactone 50mg BID
Plan: Wait until January then try restarting green cream

## 2018-11-20 RX ORDER — FLUCONAZOLE 150 MG/1
150 TABLET ORAL ONCE
Qty: 1 TABLET | Refills: 0 | Status: SHIPPED | OUTPATIENT
Start: 2018-11-20 | End: 2018-11-20

## 2018-11-27 ENCOUNTER — OFFICE VISIT (OUTPATIENT)
Dept: FAMILY MEDICINE | Facility: CLINIC | Age: 23
End: 2018-11-27
Payer: COMMERCIAL

## 2018-11-27 VITALS
RESPIRATION RATE: 14 BRPM | HEIGHT: 67 IN | TEMPERATURE: 99 F | HEART RATE: 75 BPM | SYSTOLIC BLOOD PRESSURE: 110 MMHG | BODY MASS INDEX: 21.99 KG/M2 | OXYGEN SATURATION: 98 % | WEIGHT: 140.13 LBS | DIASTOLIC BLOOD PRESSURE: 68 MMHG

## 2018-11-27 DIAGNOSIS — F90.2 ATTENTION DEFICIT HYPERACTIVITY DISORDER (ADHD), COMBINED TYPE: ICD-10-CM

## 2018-11-27 DIAGNOSIS — Z02.0 SCHOOL PHYSICAL EXAM: ICD-10-CM

## 2018-11-27 DIAGNOSIS — Z00.00 ANNUAL PHYSICAL EXAM: Primary | ICD-10-CM

## 2018-11-27 DIAGNOSIS — Z11.1 SCREENING-PULMONARY TB: ICD-10-CM

## 2018-11-27 PROCEDURE — 86580 TB INTRADERMAL TEST: CPT | Mod: S$GLB,,, | Performed by: NURSE PRACTITIONER

## 2018-11-27 PROCEDURE — 99395 PREV VISIT EST AGE 18-39: CPT | Mod: S$GLB,,, | Performed by: NURSE PRACTITIONER

## 2018-11-27 PROCEDURE — 99999 PR PBB SHADOW E&M-EST. PATIENT-LVL IV: CPT | Mod: PBBFAC,,, | Performed by: NURSE PRACTITIONER

## 2018-11-27 RX ORDER — LISDEXAMFETAMINE DIMESYLATE 50 MG/1
50 CAPSULE ORAL EVERY MORNING
Qty: 30 CAPSULE | Refills: 0 | Status: SHIPPED | OUTPATIENT
Start: 2018-12-27 | End: 2019-03-08 | Stop reason: SDUPTHER

## 2018-11-27 RX ORDER — LISDEXAMFETAMINE DIMESYLATE 60 MG/1
60 CAPSULE ORAL
COMMUNITY
End: 2018-11-27

## 2018-11-27 RX ORDER — TACROLIMUS 1 MG/G
1 OINTMENT TOPICAL 2 TIMES DAILY
Refills: 6 | COMMUNITY
Start: 2018-11-06

## 2018-11-27 RX ORDER — SPIRONOLACTONE 50 MG/1
TABLET, FILM COATED ORAL
Refills: 1 | COMMUNITY
Start: 2018-11-10 | End: 2019-02-01 | Stop reason: SDUPTHER

## 2018-11-27 RX ORDER — NAPROXEN 375 MG/1
375 TABLET ORAL
COMMUNITY
Start: 2016-08-31 | End: 2022-10-31

## 2018-11-27 RX ORDER — LISDEXAMFETAMINE DIMESYLATE 50 MG/1
50 CAPSULE ORAL EVERY MORNING
Qty: 30 CAPSULE | Refills: 0 | Status: SHIPPED | OUTPATIENT
Start: 2018-11-27 | End: 2019-03-08 | Stop reason: SDUPTHER

## 2018-11-27 RX ORDER — LISDEXAMFETAMINE DIMESYLATE 50 MG/1
50 CAPSULE ORAL EVERY MORNING
Qty: 30 CAPSULE | Refills: 0 | Status: SHIPPED | OUTPATIENT
Start: 2019-01-25 | End: 2019-03-08 | Stop reason: SDUPTHER

## 2018-11-27 RX ORDER — DAPSONE 75 MG/G
GEL TOPICAL
Refills: 3 | COMMUNITY
Start: 2018-10-09

## 2018-11-27 NOTE — PROGRESS NOTES
Subjective:       Patient ID: Elisha Grimes is a 23 y.o. female.    Chief Complaint: Annual Exam and Medication Refill    Patient is a 23 year old white female with ADHD, Acne Vulgaris and Eczema that is treated by Dermatology that is here today for annual physical exam with fasting labs results done a few months ago but I was out on personal leave for surgery so here to discuss results now.  Patient also has physical form and immunizations forms to complete for school - patient was accepted into PA school.    Patient has ADHD that is well controlled on Vyvanse at present dose.  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 [facility] [employer] in [place].  Patient had TB skin test administered today and knows to return in 48 to 72 hours to have TB skin test reading.  She reports that she had titers done at [facility] so she will get records to see what was done - she will need MMR titers and varicella titers done for school physical form.  She reports her Hepatitis B surface antibody was negative so she was started on the Hepatitis B  Series and has already received 2 doses and due to receive 3rd dose on 12/6/2018 - she will get these administration records of the series of 3 from [facility] so I can add to her immunization profile.  Immunization record on file from [facility] immunization site showed that patient had not received varicella vaccines but was able to find written immunization records from Pediatrician file that had documented Varicella immunization x 2 so will add on historical immunizations to [facility] site.  Patient also reports that she received the Gardisil/HPV vaccination series from pediatrician but I have no records of this so advised to go to pediatric MD to request all immunization records from them.    The Acne Vulgaris and Eczema are treated  by Dermatologist, Dr. Maria Luz Martinez.    Wellness Labs:  -  CBC WNL  -  CMP WNL  -  TSH WNL  -  Cholesteroll WNL    Health Maintenance:  -  Reports having had HPV vaccines - she will try to obtain records from Pediatrician.      Component      Latest Ref Rng & Units 7/31/2018   WBC      3.90 - 12.70 K/uL 4.84   RBC      4.00 - 5.40 M/uL 4.22   Hemoglobin      12.0 - 16.0 g/dL 12.4   Hematocrit      37.0 - 48.5 % 37.3   MCV      82 - 98 fL 88   MCH      27.0 - 31.0 pg 29.4   MCHC      32.0 - 36.0 g/dL 33.2   RDW      11.5 - 14.5 % 12.6   Platelets      150 - 350 K/uL 266   MPV      9.2 - 12.9 fL 10.3   Gran # (ANC)      1.8 - 7.7 K/uL 2.3   Lymph #      1.0 - 4.8 K/uL 1.9   Mono #      0.3 - 1.0 K/uL 0.3   Eos #      0.0 - 0.5 K/uL 0.3   Baso #      0.00 - 0.20 K/uL 0.03   Gran%      38.0 - 73.0 % 46.7   Lymph%      18.0 - 48.0 % 39.9   Mono%      4.0 - 15.0 % 7.0   Eosinophil%      0.0 - 8.0 % 5.6   Basophil%      0.0 - 1.9 % 0.6   Differential Method       Automated   Sodium      136 - 145 mmol/L 140   Potassium      3.5 - 5.1 mmol/L 4.1   Chloride      95 - 110 mmol/L 104   CO2      23 - 29 mmol/L 29   Glucose      70 - 110 mg/dL 86   BUN, Bld      7 - 17 mg/dL 9   Creatinine      0.50 - 1.40 mg/dL 0.67   Calcium      8.7 - 10.5 mg/dL 9.4   Total Protein      6.0 - 8.4 g/dL 7.5   Albumin      3.5 - 5.2 g/dL 4.5   Total Bilirubin      0.1 - 1.0 mg/dL 0.3   Alkaline Phosphatase      38 - 126 U/L 52   AST      15 - 46 U/L 24   ALT      10 - 44 U/L 18   Anion Gap      8 - 16 mmol/L 7 (L)   eGFR if African American      >60 mL/min/1.73 m:2 >60.0   eGFR if non African American      >60 mL/min/1.73 m:2 >60.0   Cholesterol      120 - 199 mg/dL 140   Triglycerides      30 - 150 mg/dL 68   HDL      40 - 75 mg/dL 47   LDL Cholesterol      63.0 - 159.0 mg/dL 79.4   HDL/Chol Ratio      20.0 - 50.0 % 33.6   Total Cholesterol/HDL Ratio      2.0 - 5.0 3.0   Non-HDL Cholesterol      mg/dL 93   TSH      0.400 - 4.000 uIU/mL 0.558      Current Outpatient Medications   Medication Sig Dispense Refill    ACZONE 7.5 % GlwP SPOT TREAT FACE EVERY MORNING  3    lisdexamfetamine (VYVANSE) 50 MG capsule Take 1 capsule (50 mg total) by mouth every morning. 30 capsule 0    norethindrone-e.estradiol-iron (LO LOESTRIN FE) 1 mg-10 mcg (24)/10 mcg (2) Tab TAKE 1 TABLET BY MOUTH DAILY      spironolactone (ALDACTONE) 50 MG tablet Take 2 pills in the morning and one pill at night  1    tacrolimus (PROTOPIC) 0.1 % ointment 1 application 2 (two) times daily.  6    naproxen (NAPROSYN) 375 MG tablet Take 375 mg by mouth.       No current facility-administered medications for this visit.        Past Medical History:   Diagnosis Date    Acne vulgaris 08/06/2018    TREATED BY CANDI QUINTEROS    Attention deficit hyperactivity disorder (ADHD), combined type     Eczema        History reviewed. No pertinent surgical history.    Family History   Problem Relation Age of Onset    No Known Problems Mother     Heart disease Father         passed age 63 Acute MI; had prior cardiac arrest events prior -     Hypertension Father     Hyperlipidemia Father     No Known Problems Brother     Emphysema Maternal Grandmother        Social History     Socioeconomic History    Marital status: Single     Spouse name: None    Number of children: None    Years of education: None    Highest education level: None   Social Needs    Financial resource strain: None    Food insecurity - worry: None    Food insecurity - inability: None    Transportation needs - medical: None    Transportation needs - non-medical: None   Occupational History    Occupation: student   Tobacco Use    Smoking status: Never Smoker    Smokeless tobacco: Never Used   Substance and Sexual Activity    Alcohol use: Yes     Comment: once monthly on average; vodka and water usually 2 drinks on a night she drinks    Drug use: No    Sexual activity: Yes     Partners: Male     Birth control/protection: OCP  "  Other Topics Concern    None   Social History Narrative    Going to [employer] - with plans for PA school.       Review of Systems   Constitutional: Negative for appetite change, chills, fatigue, fever and unexpected weight change.   HENT: Negative for congestion, ear pain, mouth sores, nosebleeds, postnasal drip, rhinorrhea, sinus pressure, sneezing, sore throat, trouble swallowing and voice change.    Eyes: Negative for photophobia, pain, discharge, redness, itching and visual disturbance.   Respiratory: Negative for cough, chest tightness and shortness of breath.    Cardiovascular: Negative for chest pain, palpitations and leg swelling.   Gastrointestinal: Negative for abdominal pain, blood in stool, constipation, diarrhea, nausea and vomiting.   Genitourinary: Negative for dysuria, frequency, hematuria and urgency.   Musculoskeletal: Negative for arthralgias, back pain, joint swelling and myalgias.   Skin: Negative for color change and rash.   Allergic/Immunologic: Negative for immunocompromised state.   Neurological: Negative for dizziness, seizures, syncope, weakness and headaches.   Hematological: Negative for adenopathy. Does not bruise/bleed easily.   Psychiatric/Behavioral: Negative for agitation, dysphoric mood, sleep disturbance and suicidal ideas. The patient is not nervous/anxious.          Objective:     Vitals:    11/27/18 1553   BP: 110/68   Pulse: 75   Resp: 14   Temp: 98.5 °F (36.9 °C)   TempSrc: Oral   SpO2: 98%   Weight: 63.6 kg (140 lb 1.6 oz)   Height: 5' 7" (1.702 m)          Physical Exam   Constitutional: She is oriented to person, place, and time. She appears well-developed and well-nourished.   HENT:   Head: Normocephalic and atraumatic.   Right Ear: External ear normal.   Left Ear: External ear normal.   Nose: Nose normal.   Mouth/Throat: Oropharynx is clear and moist. No oropharyngeal exudate.   Eyes: EOM are normal. Pupils are equal, round, and reactive to light. "   Neck: Normal range of motion. Neck supple. No tracheal deviation present. No thyromegaly present.   Cardiovascular: Normal rate, regular rhythm and normal heart sounds.   No murmur heard.  Pulmonary/Chest: Effort normal and breath sounds normal. No respiratory distress.   Abdominal: Soft. She exhibits no distension.   Musculoskeletal: Normal range of motion. She exhibits no edema.   Lymphadenopathy:     She has no cervical adenopathy.   Neurological: She is alert and oriented to person, place, and time. No cranial nerve deficit. Coordination normal.   Skin: Skin is warm and dry.   Psychiatric: She has a normal mood and affect.         Assessment:         ICD-10-CM ICD-9-CM   1. Annual physical exam Z00.00 V70.0   2. School physical exam Z02.0 V70.5   3. Screening-pulmonary TB Z11.1 V74.1   4. Attention deficit hyperactivity disorder (ADHD), combined type F90.2 314.01       Plan:       Annual physical exam  -  Reports having had HPV vaccines from pediatrician in the past - will look for records at home or the patient will request records from the pediatrician to obtain records.    School physical exam  -  Needs to return in 48 to 72 hours for TB skin test reading.  -  Needs to bring in records of titers for MMR and Varicella - states had done at [facility]  -  Reports receiving Hepatitis B vaccinations series again and will get 3rd injection on 12/6/2018 - advised to get me those records of immunizations so I can put on her immunization record/file officially.  -  Will release school form once all information is obtained - patient verbalizes understanding.    Screening-pulmonary TB  -     POCT TB Skin Test Read    Attention deficit hyperactivity disorder (ADHD), combined type  -  Controlled on present medication.  Recheck in 3 months.  -     lisdexamfetamine (VYVANSE) 50 MG capsule; Take 1 capsule (50 mg total) by mouth every morning.  Dispense: 30 capsule; Refill: 0  -     lisdexamfetamine  (VYVANSE) 50 MG capsule; Take 1 capsule (50 mg total) by mouth every morning.  Dispense: 30 capsule; Refill: 0  -     lisdexamfetamine (VYVANSE) 50 MG capsule; Take 1 capsule (50 mg total) by mouth every morning.  Dispense: 30 capsule; Refill: 0      Follow-up in about 3 months (around 2/27/2019) for med check.        Medication List           Accurate as of 11/27/18  9:55 PM. If you have any questions, ask your nurse or doctor.               CHANGE how you take these medications    * lisdexamfetamine 50 MG capsule  Commonly known as:  VYVANSE  Take 1 capsule (50 mg total) by mouth every morning.  What changed:    · Another medication with the same name was added. Make sure you understand how and when to take each.  · Another medication with the same name was changed. Make sure you understand how and when to take each.  Changed by:  Naila Smith NP     * lisdexamfetamine 50 MG capsule  Commonly known as:  VYVANSE  Take 1 capsule (50 mg total) by mouth every morning.  Start taking on:  12/27/2018  What changed:    · medication strength  · how much to take  · when to take this  · These instructions start on 12/27/2018. If you are unsure what to do until then, ask your doctor or other care provider.  Changed by:  Naila Smith NP     * lisdexamfetamine 50 MG capsule  Commonly known as:  VYVANSE  Take 1 capsule (50 mg total) by mouth every morning.  Start taking on:  1/25/2019  What changed:  You were already taking a medication with the same name, and this prescription was added. Make sure you understand how and when to take each.  Changed by:  Naila Smith NP     spironolactone 50 MG tablet  Commonly known as:  ALDACTONE  What changed:  Another medication with the same name was removed. Continue taking this medication, and follow the directions you see here.  Changed by:  Naila Smith NP         * This list has 3 medication(s) that are the same as other medications prescribed for you. Read the directions  carefully, and ask your doctor or other care provider to review them with you.            CONTINUE taking these medications    ACZONE 7.5 % Glwp  Generic drug:  dapsone     LO LOESTRIN FE 1 mg-10 mcg (24)/10 mcg (2) Tab  Generic drug:  norethindrone-e.estradiol-iron     NAPROSYN 375 MG tablet  Generic drug:  naproxen     tacrolimus 0.1 % ointment  Commonly known as:  PROTOPIC           Where to Get Your Medications      You can get these medications from any pharmacy    Bring a paper prescription for each of these medications  · lisdexamfetamine 50 MG capsule  · lisdexamfetamine 50 MG capsule  · lisdexamfetamine 50 MG capsule

## 2018-11-28 ENCOUNTER — PATIENT MESSAGE (OUTPATIENT)
Dept: FAMILY MEDICINE | Facility: CLINIC | Age: 23
End: 2018-11-28

## 2018-11-29 LAB
TB INDURATION - 48 HR READ: 0 MM
TB INDURATION - 72 HR READ: 0 MM
TB SKIN TEST - 48 HR READ: NEGATIVE
TB SKIN TEST - 72 HR READ: NEGATIVE

## 2018-12-06 ENCOUNTER — PATIENT MESSAGE (OUTPATIENT)
Dept: FAMILY MEDICINE | Facility: CLINIC | Age: 23
End: 2018-12-06

## 2018-12-06 DIAGNOSIS — Z78.9 MEASLES, MUMPS, RUBELLA (MMR) VACCINATION STATUS UNKNOWN: Primary | ICD-10-CM

## 2018-12-06 NOTE — TELEPHONE ENCOUNTER
Okay - MMR titers ordered and linked to your lab appt tomorrow.  Once I have results and paperwork ready, I will call you to  paperwork

## 2018-12-07 ENCOUNTER — LAB VISIT (OUTPATIENT)
Dept: LAB | Facility: HOSPITAL | Age: 23
End: 2018-12-07
Attending: NURSE PRACTITIONER
Payer: COMMERCIAL

## 2018-12-07 DIAGNOSIS — Z78.9 MEASLES, MUMPS, RUBELLA (MMR) VACCINATION STATUS UNKNOWN: ICD-10-CM

## 2018-12-07 PROCEDURE — 86765 RUBEOLA ANTIBODY: CPT

## 2018-12-07 PROCEDURE — 36415 COLL VENOUS BLD VENIPUNCTURE: CPT | Mod: PO

## 2018-12-07 PROCEDURE — 86762 RUBELLA ANTIBODY: CPT

## 2018-12-07 PROCEDURE — 86735 MUMPS ANTIBODY: CPT

## 2018-12-08 LAB
MUMPS IGG INTERPRETATION: POSITIVE
MUMPS IGG SCREEN: 2.8 ISR
RUBEOLA IGG ANTIBODY: 1.47 ISR
RUBEOLA INTERPRETATION: POSITIVE

## 2018-12-10 ENCOUNTER — PATIENT MESSAGE (OUTPATIENT)
Dept: FAMILY MEDICINE | Facility: CLINIC | Age: 23
End: 2018-12-10

## 2018-12-10 LAB
RUBV IGG SER-ACNC: 23.2 IU/ML
RUBV IGG SER-IMP: REACTIVE

## 2018-12-12 ENCOUNTER — PATIENT MESSAGE (OUTPATIENT)
Dept: FAMILY MEDICINE | Facility: CLINIC | Age: 23
End: 2018-12-12

## 2019-01-29 ENCOUNTER — PATIENT MESSAGE (OUTPATIENT)
Dept: FAMILY MEDICINE | Facility: CLINIC | Age: 24
End: 2019-01-29

## 2019-02-01 ENCOUNTER — OFFICE VISIT (OUTPATIENT)
Dept: PODIATRY | Facility: CLINIC | Age: 24
End: 2019-02-01
Payer: COMMERCIAL

## 2019-02-01 VITALS
BODY MASS INDEX: 21.97 KG/M2 | SYSTOLIC BLOOD PRESSURE: 126 MMHG | HEART RATE: 103 BPM | HEIGHT: 67 IN | WEIGHT: 140 LBS | DIASTOLIC BLOOD PRESSURE: 79 MMHG

## 2019-02-01 DIAGNOSIS — B07.0 PLANTAR WART, RIGHT FOOT: ICD-10-CM

## 2019-02-01 DIAGNOSIS — L03.032 CELLULITIS OF THIRD TOE, LEFT: Primary | ICD-10-CM

## 2019-02-01 PROCEDURE — 99999 PR PBB SHADOW E&M-EST. PATIENT-LVL III: ICD-10-PCS | Mod: PBBFAC,,, | Performed by: PODIATRIST

## 2019-02-01 PROCEDURE — 17000 DESTRUCT PREMALG LESION: CPT | Mod: S$GLB,,, | Performed by: PODIATRIST

## 2019-02-01 PROCEDURE — 3008F PR BODY MASS INDEX (BMI) DOCUMENTED: ICD-10-PCS | Mod: CPTII,S$GLB,, | Performed by: PODIATRIST

## 2019-02-01 PROCEDURE — 3008F BODY MASS INDEX DOCD: CPT | Mod: CPTII,S$GLB,, | Performed by: PODIATRIST

## 2019-02-01 PROCEDURE — 99999 PR PBB SHADOW E&M-EST. PATIENT-LVL III: CPT | Mod: PBBFAC,,, | Performed by: PODIATRIST

## 2019-02-01 PROCEDURE — 99213 OFFICE O/P EST LOW 20 MIN: CPT | Mod: 25,S$GLB,, | Performed by: PODIATRIST

## 2019-02-01 PROCEDURE — 99213 PR OFFICE/OUTPT VISIT, EST, LEVL III, 20-29 MIN: ICD-10-PCS | Mod: 25,S$GLB,, | Performed by: PODIATRIST

## 2019-02-01 PROCEDURE — 17000 PR DESTRUCTION(LASER SURGERY,CRYOSURGERY,CHEMOSURGERY),PREMALIGNANT LESIONS,FIRST LESION: ICD-10-PCS | Mod: S$GLB,,, | Performed by: PODIATRIST

## 2019-02-01 RX ORDER — TRIAMCINOLONE ACETONIDE 1 MG/G
CREAM TOPICAL
COMMUNITY
Start: 2016-06-10

## 2019-02-01 RX ORDER — METHYLPHENIDATE HYDROCHLORIDE 5 MG/1
TABLET ORAL
COMMUNITY
Start: 2016-06-21 | End: 2019-03-08

## 2019-02-01 RX ORDER — LISDEXAMFETAMINE DIMESYLATE 60 MG/1
60 CAPSULE ORAL
COMMUNITY
End: 2019-03-08

## 2019-02-01 RX ORDER — PIMECROLIMUS 10 MG/G
CREAM TOPICAL
COMMUNITY
Start: 2017-01-23 | End: 2019-03-08

## 2019-02-01 RX ORDER — CEPHALEXIN 500 MG/1
500 CAPSULE ORAL 4 TIMES DAILY
Qty: 40 CAPSULE | Refills: 0 | Status: SHIPPED | OUTPATIENT
Start: 2019-02-01 | End: 2019-03-08

## 2019-02-01 RX ORDER — FLURANDRENOLIDE 0.5 MG/ML
LOTION TOPICAL
COMMUNITY
End: 2021-04-22 | Stop reason: ALTCHOICE

## 2019-02-01 NOTE — PATIENT INSTRUCTIONS
Keep dressing intact x 3 days then remove and wash. Pat dry and keep covered with band aid daily.

## 2019-02-02 NOTE — PROGRESS NOTES
"Subjective:      Patient ID: Elisha Grimes is a 23 y.o. female.    Chief Complaint: Toe Pain (Left 2nd metatarsal redness)    Complains of painful left third toe, right second toe and pain under ball of left foot for past several weeks. History of previous ingrown nail treated with partial nail avulsion 6/29/17. Accompanied by her mother. Denies trauma. Relates she exercises a lot especially running. She completed a course of oral antiibiotic that she she can not recall the name > 2 weeks ago prescribed per an C. Wearing slip on shoes today.     2/15/18: Follow up from steroid injection to third toe and bursa sac plantar 5 MTP left foot. Relates pain signficantly improved but still has some mild pain and discoloration to left third toe and mild pain plantar left fifth met head.     2/1/19: Complains of pain and discoloration to left third toe. 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 Denies trauma.     Vitals:    02/01/19 1500   BP: 126/79   Pulse: 103   Weight: 63.5 kg (140 lb)   Height: 5' 7" (1.702 m)   PainSc:   5   PainLoc: Toe      Past Medical History:   Diagnosis Date    Acne vulgaris 08/06/2018    TREATED BY CANDI QUINTEROS    Attention deficit hyperactivity disorder (ADHD), combined type     Eczema        History reviewed. No pertinent surgical history.    Family History   Problem Relation Age of Onset    No Known Problems Mother     Heart disease Father         passed age 63 Acute MI; had prior cardiac arrest events prior -     Hypertension Father     Hyperlipidemia Father     No Known Problems Brother     Emphysema Maternal Grandmother        Social History     Socioeconomic History    Marital status: Single     Spouse name: None    Number of children: None    Years of education: None    Highest education level: None   Social Needs    Financial resource strain: None    Food insecurity - worry: None "    Food insecurity - inability: None    Transportation needs - medical: None    Transportation needs - non-medical: None   Occupational History    Occupation: student   Tobacco Use    Smoking status: Never Smoker    Smokeless tobacco: Never Used   Substance and Sexual Activity    Alcohol use: Yes     Comment: once monthly on average; vodka and water usually 2 drinks on a night she drinks    Drug use: No    Sexual activity: Yes     Partners: Male     Birth control/protection: OCP   Other Topics Concern    None   Social History Narrative    Going to [employer] - [id number] - with plans for PA school.       Current Outpatient Medications   Medication Sig Dispense Refill    methylphenidate HCl (RITALIN) 5 MG tablet TAKE 1 TABLET BY MOUTH every day in the afternoon      norethindrone-e.estradiol-iron (LO LOESTRIN FE) 1 mg-10 mcg (24)/10 mcg (2) Tab TAKE 1 TABLET BY MOUTH DAILY      pimecrolimus (ELIDEL) 1 % cream Apply topically.      triamcinolone acetonide 0.1% (KENALOG) 0.1 % cream Apply to areas of eczema on body twice a day      ACZONE 7.5 % GlwP SPOT TREAT FACE EVERY MORNING  3    cephALEXin (KEFLEX) 500 MG capsule Take 1 capsule (500 mg total) by mouth 4 (four) times daily. 40 capsule 0    flurandrenolide (CORDRAN) 0.05 % lotion Apply topically.      lisdexamfetamine (VYVANSE) 50 MG capsule Take 1 capsule (50 mg total) by mouth every morning. 30 capsule 0    lisdexamfetamine (VYVANSE) 50 MG capsule Take 1 capsule (50 mg total) by mouth every morning. 30 capsule 0    lisdexamfetamine (VYVANSE) 50 MG capsule Take 1 capsule (50 mg total) by mouth every morning. 30 capsule 0    lisdexamfetamine (VYVANSE) 60 MG capsule Take 60 mg by mouth.      mupirocin (BACTROBAN) 2 % ointment APPLY TOPICALLY 2 TIMES DAILY. 22 g 1    naproxen (NAPROSYN) 375 MG tablet Take 375 mg by mouth.      nystatin (MYCOSTATIN) ointment APPLY TOPICALLY  2 TO 3 TIMES DAILY FOR 1 TO 2 WEEKS 30 g 6    spironolactone  (ALDACTONE) 50 MG tablet TAKE 1 TABLET BY MOUTH TWICE A DAY 60 tablet 11    tacrolimus (PROTOPIC) 0.1 % ointment 1 application 2 (two) times daily.  6    tacrolimus (PROTOPIC) 0.1 % ointment Apply topically TWICE DAILY. 60 g 6     No current facility-administered medications for this visit.        Review of patient's allergies indicates:  No Known Allergies      Review of Systems   Constitution: Negative for chills, fever, weakness and malaise/fatigue.   Cardiovascular: Negative for chest pain, claudication and leg swelling.   Respiratory: Negative for cough and shortness of breath.    Skin: Positive for color change and suspicious lesions. Negative for itching, nail changes and poor wound healing.   Musculoskeletal: Positive for myalgias. Negative for back pain, joint pain, muscle cramps and muscle weakness.   Gastrointestinal: Negative for nausea and vomiting.   Neurological: Negative for numbness and paresthesias.   Psychiatric/Behavioral: Negative for altered mental status.           Objective:      Physical Exam   Constitutional: She is oriented to person, place, and time. She appears well-developed and well-nourished. No distress.   Cardiovascular:   Pulses:       Dorsalis pedis pulses are 2+ on the right side, and 2+ on the left side.        Posterior tibial pulses are 2+ on the right side, and 2+ on the left side.   CFT< 3 secs all toes bilateral foot, skin temp warm bilateral foot, + digital hair growth bilateral foot, no lower extremity edema bilateral except for localized to left third toe, plantar fifth met head region left foot and right second toe.     Musculoskeletal:   + tailors bunion bilateral foot.    + equinus that reduces with knee bent bilateral.     Neurological: She is alert and oriented to person, place, and time. She has normal strength. No sensory deficit.   Skin: Skin is warm, dry and intact. Capillary refill takes less than 2 seconds. No ecchymosis and no rash noted. She is not  diaphoretic. There is erythema. No cyanosis. Nails show no clubbing.   Mild localized pain, edema and erythema localized to left third toe noting loose nail at proximal nail fold. Previous mass no longer palpated left third toe.    Verrucous lesion noted adistal right 2nd toe with a disruption of skin lines, papular bleeding upon debridement and pain with side-to side compression.    Previous wound left lateral fifth met head healed.             Assessment:       Encounter Diagnoses   Name Primary?    Cellulitis of third toe, left Yes    Plantar wart, right foot          Plan:       Elisha was seen today for toe pain.    Diagnoses and all orders for this visit:    Cellulitis of third toe, left    Plantar wart, right foot    Other orders  -     cephALEXin (KEFLEX) 500 MG capsule; Take 1 capsule (500 mg total) by mouth 4 (four) times daily.      I counseled the patient on her conditions, their implications and medical management.    Clinically bursal sac left plantar fifth met head is resolving with steroid injection.    Rx cephalexin for cellulitis left third toe. Discussed wearing appropriate shoe gear to avoid further causing irritation to the toe. Discussed importance of wearing shoes with adequate room in toe box.    Debrided wart distal right second toe and applied blistering agent. She tolerated the procedure well without complication. No blood loss, no pain. Dressed in bandaid and tape.    rtc 10 days    Assisted by Grey Gomez, PGY2    I have personally taken the history and examined this patient and agree with the resident's note as stated as above.   Can Davis DPM, FACFAS      .

## 2019-02-15 ENCOUNTER — PATIENT MESSAGE (OUTPATIENT)
Dept: FAMILY MEDICINE | Facility: CLINIC | Age: 24
End: 2019-02-15

## 2019-02-20 ENCOUNTER — TELEPHONE (OUTPATIENT)
Dept: PODIATRY | Facility: CLINIC | Age: 24
End: 2019-02-20

## 2019-02-20 NOTE — TELEPHONE ENCOUNTER
----- Message from Akira Daniels sent at 2/20/2019  2:30 PM CST -----  Contact: Tercicahart  Message from [facility], System Message sent at 2/20/2019 11:51 AM CST -----    Elisha Grimes would like to cancel the following appointments:    Can Davis DPM in [facility] ([id number]), 2/22/2019  1:30 PM    Comments:  Can't come in because of school

## 2019-02-20 NOTE — TELEPHONE ENCOUNTER
----- Message from Lucia Hernández sent at 2/20/2019 11:55 AM CST -----  Contact: self  Message from [facility], System Message sent at 2/20/2019 11:50 AM CST -----    Elisha Grimes would like to cancel the following appointments:    Can Davis DPM in [facility] ([id number]), 2/22/2019  1:30 PM    Comments:

## 2019-03-08 ENCOUNTER — OFFICE VISIT (OUTPATIENT)
Dept: FAMILY MEDICINE | Facility: CLINIC | Age: 24
End: 2019-03-08
Payer: COMMERCIAL

## 2019-03-08 VITALS
OXYGEN SATURATION: 95 % | BODY MASS INDEX: 20.82 KG/M2 | RESPIRATION RATE: 16 BRPM | SYSTOLIC BLOOD PRESSURE: 118 MMHG | TEMPERATURE: 99 F | WEIGHT: 132.63 LBS | HEIGHT: 67 IN | HEART RATE: 95 BPM | DIASTOLIC BLOOD PRESSURE: 78 MMHG

## 2019-03-08 DIAGNOSIS — F90.2 ATTENTION DEFICIT HYPERACTIVITY DISORDER (ADHD), COMBINED TYPE: ICD-10-CM

## 2019-03-08 PROCEDURE — 99999 PR PBB SHADOW E&M-EST. PATIENT-LVL IV: CPT | Mod: PBBFAC,,, | Performed by: NURSE PRACTITIONER

## 2019-03-08 PROCEDURE — 3008F BODY MASS INDEX DOCD: CPT | Mod: CPTII,S$GLB,, | Performed by: NURSE PRACTITIONER

## 2019-03-08 PROCEDURE — 99213 PR OFFICE/OUTPT VISIT, EST, LEVL III, 20-29 MIN: ICD-10-PCS | Mod: S$GLB,,, | Performed by: NURSE PRACTITIONER

## 2019-03-08 PROCEDURE — 99999 PR PBB SHADOW E&M-EST. PATIENT-LVL IV: ICD-10-PCS | Mod: PBBFAC,,, | Performed by: NURSE PRACTITIONER

## 2019-03-08 PROCEDURE — 3008F PR BODY MASS INDEX (BMI) DOCUMENTED: ICD-10-PCS | Mod: CPTII,S$GLB,, | Performed by: NURSE PRACTITIONER

## 2019-03-08 PROCEDURE — 99213 OFFICE O/P EST LOW 20 MIN: CPT | Mod: S$GLB,,, | Performed by: NURSE PRACTITIONER

## 2019-03-08 RX ORDER — LISDEXAMFETAMINE DIMESYLATE 50 MG/1
50 CAPSULE ORAL EVERY MORNING
Qty: 30 CAPSULE | Refills: 0 | Status: SHIPPED | OUTPATIENT
Start: 2019-03-21 | End: 2019-05-03 | Stop reason: SDUPTHER

## 2019-03-08 RX ORDER — LISDEXAMFETAMINE DIMESYLATE 50 MG/1
50 CAPSULE ORAL
COMMUNITY
Start: 2019-01-04 | End: 2019-03-08 | Stop reason: SDUPTHER

## 2019-03-08 NOTE — PROGRESS NOTES
Subjective:       Patient ID: Elisha Grimes is a 23 y.o. female.    Chief Complaint: ADHD (refill on medication)    Patient is a 23 year old white female with ADHD, Acne Vulgaris and Eczema that is treated by Dermatology that is here today for ADHD follow up.     Patient has ADHD that is well controlled on Vyvanse at present dose.  Patient graduated [employer] with a 4 year degree in Biology and and was just accepted into [employer] program in [place] starting this Spring Semester 2019..     The Acne Vulgaris and Eczema are treated by Dermatologist, Dr. Candi Martinez.        Current Outpatient Medications   Medication Sig Dispense Refill    ACZONE 7.5 % GlwP SPOT TREAT FACE EVERY MORNING  3    flurandrenolide (CORDRAN) 0.05 % lotion Apply topically.      [START ON 3/21/2019] lisdexamfetamine (VYVANSE) 50 MG capsule Take 1 capsule (50 mg total) by mouth every morning. 30 capsule 0    naproxen (NAPROSYN) 375 MG tablet Take 375 mg by mouth.      norethindrone-e.estradiol-iron (LO LOESTRIN FE) 1 mg-10 mcg (24)/10 mcg (2) Tab TAKE 1 TABLET BY MOUTH DAILY      spironolactone (ALDACTONE) 50 MG tablet TAKE 1 TABLET BY MOUTH TWICE A DAY 60 tablet 11    tacrolimus (PROTOPIC) 0.1 % ointment 1 application 2 (two) times daily.  6    triamcinolone acetonide 0.1% (KENALOG) 0.1 % cream Apply to areas of eczema on body twice a day       No current facility-administered medications for this visit.        Past Medical History:   Diagnosis Date    Acne vulgaris 08/06/2018    TREATED BY CANDI MARTINEZ    Attention deficit hyperactivity disorder (ADHD), combined type     Eczema        History reviewed. No pertinent surgical history.    Family History   Problem Relation Age of Onset    No Known Problems Mother     Heart disease Father         passed age 63 Acute MI; had prior cardiac arrest events prior -     Hypertension Father     Hyperlipidemia Father     No Known Problems Brother     Emphysema Maternal  Grandmother        Social History     Socioeconomic History    Marital status: Single     Spouse name: None    Number of children: None    Years of education: None    Highest education level: None   Social Needs    Financial resource strain: None    Food insecurity - worry: None    Food insecurity - inability: None    Transportation needs - medical: None    Transportation needs - non-medical: None   Occupational History    Occupation: student   Tobacco Use    Smoking status: Never Smoker    Smokeless tobacco: Never Used   Substance and Sexual Activity    Alcohol use: Yes     Comment: once monthly on average; vodka and water usually 2 drinks on a night she drinks    Drug use: No    Sexual activity: Yes     Partners: Male     Birth control/protection: OCP   Other Topics Concern    None   Social History Narrative    Going to [employer] [facility] [employer] - with plans for PA school.       Review of Systems   Constitutional: Negative for appetite change, chills, fatigue, fever and unexpected weight change.   HENT: Negative for congestion, ear pain, mouth sores, nosebleeds, postnasal drip, rhinorrhea, sinus pressure, sneezing, sore throat, trouble swallowing and voice change.    Eyes: Negative for photophobia, pain, discharge, redness, itching and visual disturbance.   Respiratory: Negative for cough, chest tightness and shortness of breath.    Cardiovascular: Negative for chest pain, palpitations and leg swelling.   Gastrointestinal: Negative for abdominal pain, blood in stool, constipation, diarrhea, nausea and vomiting.   Genitourinary: Negative for dysuria, frequency, hematuria and urgency.   Musculoskeletal: Negative for arthralgias, back pain, joint swelling and myalgias.   Skin: Negative for color change and rash.   Allergic/Immunologic: Negative for immunocompromised state.   Neurological: Negative for dizziness, seizures, syncope, weakness and headaches.   Hematological: Negative for adenopathy.  "Does not bruise/bleed easily.   Psychiatric/Behavioral: Negative for agitation, dysphoric mood, sleep disturbance and suicidal ideas. The patient is not nervous/anxious.          Objective:     Vitals:    03/08/19 1547   BP: 118/78   BP Location: Right arm   Patient Position: Sitting   BP Method: Large (Manual)   Pulse: 95   Resp: 16   Temp: 98.5 °F (36.9 °C)   TempSrc: Oral   SpO2: 95%   Weight: 60.2 kg (132 lb 9.7 oz)   Height: 5' 7" (1.702 m)          Physical Exam   Constitutional: She is oriented to person, place, and time. She appears well-developed and well-nourished.   HENT:   Head: Normocephalic and atraumatic.   Right Ear: External ear normal.   Left Ear: External ear normal.   Nose: Nose normal.   Mouth/Throat: Oropharynx is clear and moist. No oropharyngeal exudate.   Eyes: EOM are normal. Pupils are equal, round, and reactive to light.   Neck: Normal range of motion. Neck supple. No tracheal deviation present. No thyromegaly present.   Cardiovascular: Normal rate, regular rhythm and normal heart sounds.   No murmur heard.  Pulmonary/Chest: Effort normal and breath sounds normal. No respiratory distress.   Abdominal: Soft. She exhibits no distension.   Musculoskeletal: Normal range of motion. She exhibits no edema.   Lymphadenopathy:     She has no cervical adenopathy.   Neurological: She is alert and oriented to person, place, and time. No cranial nerve deficit. Coordination normal.   Skin: Skin is warm and dry.   Psychiatric: She has a normal mood and affect.         Assessment:         ICD-10-CM ICD-9-CM   1. Attention deficit hyperactivity disorder (ADHD), combined type F90.2 314.01       Plan:       Attention deficit hyperactivity disorder (ADHD), combined type  -  Patient had Vyvanse filled on 2/20/2019 so I sent in a prescription downstairs for Vyvanse to fill on 3/21/2019.  Patient will message me when due for the next prescriptions after that.  Follow up is every 3 months regardless of if " prescription is due or not.  -     lisdexamfetamine (VYVANSE) 50 MG capsule; Take 1 capsule (50 mg total) by mouth every morning.  Dispense: 30 capsule; Refill: 0      Follow-up in about 3 months (around 6/8/2019) for med check.     Patient's Medications   New Prescriptions    No medications on file   Previous Medications    ACZONE 7.5 % GLWP    SPOT TREAT FACE EVERY MORNING    FLURANDRENOLIDE (CORDRAN) 0.05 % LOTION    Apply topically.    NAPROXEN (NAPROSYN) 375 MG TABLET    Take 375 mg by mouth.    NORETHINDRONE-E.ESTRADIOL-IRON (LO LOESTRIN FE) 1 MG-10 MCG (24)/10 MCG (2) TAB    TAKE 1 TABLET BY MOUTH DAILY    SPIRONOLACTONE (ALDACTONE) 50 MG TABLET    TAKE 1 TABLET BY MOUTH TWICE A DAY    TACROLIMUS (PROTOPIC) 0.1 % OINTMENT    1 application 2 (two) times daily.    TRIAMCINOLONE ACETONIDE 0.1% (KENALOG) 0.1 % CREAM    Apply to areas of eczema on body twice a day   Modified Medications    Modified Medication Previous Medication    LISDEXAMFETAMINE (VYVANSE) 50 MG CAPSULE lisdexamfetamine (VYVANSE) 50 MG capsule       Take 1 capsule (50 mg total) by mouth every morning.    Take 1 capsule (50 mg total) by mouth every morning.   Discontinued Medications    CEPHALEXIN (KEFLEX) 500 MG CAPSULE    Take 1 capsule (500 mg total) by mouth 4 (four) times daily.    CLINDAMYCIN (CLEOCIN) 300 MG CAPSULE    Take 1 capsule by mouth four times a day    LISDEXAMFETAMINE (VYVANSE) 50 MG CAPSULE    Take 1 capsule (50 mg total) by mouth every morning.    LISDEXAMFETAMINE (VYVANSE) 50 MG CAPSULE    Take 1 capsule (50 mg total) by mouth every morning.    LISDEXAMFETAMINE (VYVANSE) 50 MG CAPSULE    Take 50 mg by mouth.    LISDEXAMFETAMINE (VYVANSE) 60 MG CAPSULE    Take 60 mg by mouth.    METHYLPHENIDATE HCL (RITALIN) 5 MG TABLET    TAKE 1 TABLET BY MOUTH every day in the afternoon    MUPIROCIN (BACTROBAN) 2 % OINTMENT    APPLY TOPICALLY 2 TIMES DAILY.    NORETHINDRONE-E.ESTRADIOL-IRON (LO LOESTRIN FE) 1 MG-10 MCG (24)/10 MCG (2) TAB     Take 1 tablet by mouth.    NYSTATIN (MYCOSTATIN) OINTMENT    APPLY TOPICALLY  2 TO 3 TIMES DAILY FOR 1 TO 2 WEEKS    PIMECROLIMUS (ELIDEL) 1 % CREAM    Apply topically.    TACROLIMUS (PROTOPIC) 0.1 % OINTMENT    Apply topically TWICE DAILY.

## 2019-05-03 DIAGNOSIS — F90.2 ATTENTION DEFICIT HYPERACTIVITY DISORDER (ADHD), COMBINED TYPE: ICD-10-CM

## 2019-05-03 RX ORDER — LISDEXAMFETAMINE DIMESYLATE 50 MG/1
50 CAPSULE ORAL EVERY MORNING
Qty: 30 CAPSULE | Refills: 0 | Status: SHIPPED | OUTPATIENT
Start: 2019-05-03 | End: 2019-06-14 | Stop reason: SDUPTHER

## 2019-05-28 ENCOUNTER — PATIENT MESSAGE (OUTPATIENT)
Dept: FAMILY MEDICINE | Facility: CLINIC | Age: 24
End: 2019-05-28

## 2019-05-28 ENCOUNTER — TELEPHONE (OUTPATIENT)
Dept: FAMILY MEDICINE | Facility: CLINIC | Age: 24
End: 2019-05-28

## 2019-05-28 NOTE — TELEPHONE ENCOUNTER
----- Message from Sally Osman sent at 5/28/2019  2:33 PM CDT -----  Contact: Self [phone]  Patient Returning Your Phone Call. Please send a message thru [facility].

## 2019-06-06 DIAGNOSIS — F90.2 ATTENTION DEFICIT HYPERACTIVITY DISORDER (ADHD), COMBINED TYPE: ICD-10-CM

## 2019-06-06 RX ORDER — LISDEXAMFETAMINE DIMESYLATE 50 MG/1
50 CAPSULE ORAL EVERY MORNING
Qty: 30 CAPSULE | Refills: 0 | Status: CANCELLED | OUTPATIENT
Start: 2019-06-06

## 2019-06-14 ENCOUNTER — TELEPHONE (OUTPATIENT)
Dept: FAMILY MEDICINE | Facility: CLINIC | Age: 24
End: 2019-06-14

## 2019-06-14 ENCOUNTER — OFFICE VISIT (OUTPATIENT)
Dept: FAMILY MEDICINE | Facility: CLINIC | Age: 24
End: 2019-06-14
Payer: COMMERCIAL

## 2019-06-14 VITALS
OXYGEN SATURATION: 97 % | DIASTOLIC BLOOD PRESSURE: 64 MMHG | SYSTOLIC BLOOD PRESSURE: 110 MMHG | BODY MASS INDEX: 20.4 KG/M2 | WEIGHT: 130 LBS | RESPIRATION RATE: 18 BRPM | TEMPERATURE: 98 F | HEIGHT: 67 IN | HEART RATE: 83 BPM

## 2019-06-14 DIAGNOSIS — F90.2 ATTENTION DEFICIT HYPERACTIVITY DISORDER (ADHD), COMBINED TYPE: Primary | ICD-10-CM

## 2019-06-14 PROCEDURE — 3008F BODY MASS INDEX DOCD: CPT | Mod: CPTII,S$GLB,, | Performed by: NURSE PRACTITIONER

## 2019-06-14 PROCEDURE — 99213 OFFICE O/P EST LOW 20 MIN: CPT | Mod: S$GLB,,, | Performed by: NURSE PRACTITIONER

## 2019-06-14 PROCEDURE — 99999 PR PBB SHADOW E&M-EST. PATIENT-LVL V: ICD-10-PCS | Mod: PBBFAC,,, | Performed by: NURSE PRACTITIONER

## 2019-06-14 PROCEDURE — 99999 PR PBB SHADOW E&M-EST. PATIENT-LVL V: CPT | Mod: PBBFAC,,, | Performed by: NURSE PRACTITIONER

## 2019-06-14 PROCEDURE — 99213 PR OFFICE/OUTPT VISIT, EST, LEVL III, 20-29 MIN: ICD-10-PCS | Mod: S$GLB,,, | Performed by: NURSE PRACTITIONER

## 2019-06-14 PROCEDURE — 3008F PR BODY MASS INDEX (BMI) DOCUMENTED: ICD-10-PCS | Mod: CPTII,S$GLB,, | Performed by: NURSE PRACTITIONER

## 2019-06-14 RX ORDER — LISDEXAMFETAMINE DIMESYLATE 60 MG/1
60 CAPSULE ORAL EVERY MORNING
Qty: 30 CAPSULE | Refills: 0 | Status: SHIPPED | OUTPATIENT
Start: 2019-06-14 | End: 2019-07-12 | Stop reason: SDUPTHER

## 2019-06-14 NOTE — TELEPHONE ENCOUNTER
Informed patient she can come in early if she would like however, it isn't promised she would be seen before appointment time.

## 2019-06-14 NOTE — PROGRESS NOTES
Subjective:       Patient ID: Elisha Grimes is a 23 y.o. female.    Chief Complaint: Follow-up (med check)      Patient is a 23 year old white female with ADHD, Acne Vulgaris and Eczema that is treated by Dermatology that is here today for ADHD follow up.     Patient has ADHD that is  controlled on Vyvanse at present dose however she reports her school days are lasting longer and not finding lasting all day with lectures in evening - asking for increase in dose.  Patient graduated [employer] with a 4 year degree in Biology and and was just accepted into [facility] program in [place] starting this Spring Semester 2019. Currently in the Urology rotation.     The Acne Vulgaris and Eczema are treated by Dermatologist, Dr. Maria Luz Martinez.      Current Outpatient Medications   Medication Sig Dispense Refill    ACZONE 7.5 % GlwP SPOT TREAT FACE EVERY MORNING  3    hydrocortisone 2.5 % ointment Apply 1 application twice daily up to 2 weeks on face and neck. 28.35 g 0    hydrOXYzine HCl (ATARAX) 25 MG tablet Take 1 to 2 tablets (25 mg total) by mouth nightly. 60 tablet 0    lisdexamfetamine (VYVANSE) 60 MG capsule Take 1 capsule (60 mg total) by mouth every morning. 30 capsule 0    naproxen (NAPROSYN) 375 MG tablet Take 375 mg by mouth.      norethindrone-e.estradiol-iron (LO LOESTRIN FE) 1 mg-10 mcg (24)/10 mcg (2) Tab TAKE 1 TABLET BY MOUTH DAILY      norethindrone-e.estradiol-iron (LO LOESTRIN FE) 1 mg-10 mcg (24)/10 mcg (2) Tab Take 1 tablet by mouth once daily 84 tablet 3    spironolactone (ALDACTONE) 50 MG tablet TAKE 1 TABLET BY MOUTH TWICE A DAY 60 tablet 11    tacrolimus (PROTOPIC) 0.1 % ointment 1 application 2 (two) times daily.  6    triamcinolone acetonide 0.1% (KENALOG) 0.1 % cream Apply to areas of eczema on body twice a day      flurandrenolide (CORDRAN) 0.05 % lotion Apply topically.       No current facility-administered medications for this visit.        Past Medical History:    Diagnosis Date    Acne vulgaris 08/06/2018    TREATED BY CANDI QUINTEROS    Attention deficit hyperactivity disorder (ADHD), combined type     Eczema        History reviewed. No pertinent surgical history.    Family History   Problem Relation Age of Onset    No Known Problems Mother     Heart disease Father         passed age 63 Acute MI; had prior cardiac arrest events prior -     Hypertension Father     Hyperlipidemia Father     No Known Problems Brother     Emphysema Maternal Grandmother        Social History     Socioeconomic History    Marital status: Single     Spouse name: Not on file    Number of children: Not on file    Years of education: Not on file    Highest education level: Not on file   Occupational History    Occupation: student   Social Needs    Financial resource strain: Not on file    Food insecurity:     Worry: Never true     Inability: Never true    Transportation needs:     Medical: No     Non-medical: No   Tobacco Use    Smoking status: Never Smoker    Smokeless tobacco: Never Used   Substance and Sexual Activity    Alcohol use: Yes     Frequency: Monthly or less     Drinks per session: 3 or 4     Binge frequency: Never     Comment: once monthly on average; vodka and water usually 2 drinks on a night she drinks    Drug use: No    Sexual activity: Yes     Partners: Male     Birth control/protection: OCP   Lifestyle    Physical activity:     Days per week: 3 days     Minutes per session: 90 min    Stress: Not at all   Relationships    Social connections:     Talks on phone: More than three times a week     Gets together: More than three times a week     Attends [employer] service: Not on file     Active member of club or organization: No     Attends meetings of clubs or organizations: Patient refused     Relationship status: Never    Other Topics Concern    Not on file   Social History Narrative    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  "      Review of Systems   Constitutional: Negative for activity change and unexpected weight change.   HENT: Negative for hearing loss, rhinorrhea and trouble swallowing.    Eyes: Negative for discharge and visual disturbance.   Respiratory: Negative for chest tightness and wheezing.    Cardiovascular: Negative for chest pain and palpitations.   Gastrointestinal: Negative for blood in stool, constipation, diarrhea and vomiting.   Endocrine: Negative for polydipsia and polyuria.   Genitourinary: Negative for difficulty urinating, dysuria, hematuria and menstrual problem.   Musculoskeletal: Negative for arthralgias, joint swelling and neck pain.   Neurological: Negative for weakness and headaches.   Psychiatric/Behavioral: Positive for decreased concentration. Negative for confusion and dysphoric mood.         Objective:     Vitals:    06/14/19 1546   BP: 110/64   Pulse: 83   Resp: 18   Temp: 98.2 °F (36.8 °C)   TempSrc: Oral   SpO2: 97%   Weight: 59 kg (130 lb)   Height: 5' 7" (1.702 m)          Physical Exam   Constitutional: She is oriented to person, place, and time. She appears well-developed and well-nourished.   HENT:   Head: Normocephalic and atraumatic.   Right Ear: External ear normal.   Left Ear: External ear normal.   Nose: Nose normal.   Mouth/Throat: Oropharynx is clear and moist. No oropharyngeal exudate.   Eyes: Pupils are equal, round, and reactive to light. EOM are normal.   Neck: Normal range of motion. Neck supple. No tracheal deviation present. No thyromegaly present.   Cardiovascular: Normal rate, regular rhythm and normal heart sounds.   No murmur heard.  Pulmonary/Chest: Effort normal and breath sounds normal. No respiratory distress.   Abdominal: Soft. She exhibits no distension.   Musculoskeletal: Normal range of motion. She exhibits no edema.   Lymphadenopathy:     She has no cervical adenopathy.   Neurological: She is alert and oriented to person, place, and time. No cranial nerve deficit. " Coordination normal.   Skin: Skin is warm and dry.   Psychiatric: She has a normal mood and affect.         Assessment:         ICD-10-CM ICD-9-CM   1. Attention deficit hyperactivity disorder (ADHD), combined type F90.2 314.01       Plan:       Attention deficit hyperactivity disorder (ADHD), combined type  -  Increase the Vyvanse from 50 to 60 mg daily and recheck in 4 weeks.  -     lisdexamfetamine (VYVANSE) 60 MG capsule; Take 1 capsule (60 mg total) by mouth every morning.  Dispense: 30 capsule; Refill: 0      Follow up in about 1 month (around 7/12/2019) for ADD check.     Patient's Medications   New Prescriptions    No medications on file   Previous Medications    ACZONE 7.5 % GLWP    SPOT TREAT FACE EVERY MORNING    FLURANDRENOLIDE (CORDRAN) 0.05 % LOTION    Apply topically.    HYDROCORTISONE 2.5 % OINTMENT    Apply 1 application twice daily up to 2 weeks on face and neck.    HYDROXYZINE HCL (ATARAX) 25 MG TABLET    Take 1 to 2 tablets (25 mg total) by mouth nightly.    NAPROXEN (NAPROSYN) 375 MG TABLET    Take 375 mg by mouth.    NORETHINDRONE-E.ESTRADIOL-IRON (LO LOESTRIN FE) 1 MG-10 MCG (24)/10 MCG (2) TAB    TAKE 1 TABLET BY MOUTH DAILY    NORETHINDRONE-E.ESTRADIOL-IRON (LO LOESTRIN FE) 1 MG-10 MCG (24)/10 MCG (2) TAB    Take 1 tablet by mouth once daily    SPIRONOLACTONE (ALDACTONE) 50 MG TABLET    TAKE 1 TABLET BY MOUTH TWICE A DAY    TACROLIMUS (PROTOPIC) 0.1 % OINTMENT    1 application 2 (two) times daily.    TRIAMCINOLONE ACETONIDE 0.1% (KENALOG) 0.1 % CREAM    Apply to areas of eczema on body twice a day   Modified Medications    Modified Medication Previous Medication    LISDEXAMFETAMINE (VYVANSE) 60 MG CAPSULE lisdexamfetamine (VYVANSE) 50 MG capsule       Take 1 capsule (60 mg total) by mouth every morning.    Take 1 capsule (50 mg total) by mouth every morning.   Discontinued Medications    PREDNISONE (DELTASONE) 10 MG TABLET    Take 1 tablet by mouth once daily for 4 days, then 1/2 tablet once  daily for 4 days then stop

## 2019-06-14 NOTE — TELEPHONE ENCOUNTER
----- Message from Lucy Bhatt sent at 6/14/2019  2:59 PM CDT -----  Contact: [phone]   Pt is calling to find out if you have sooner time slot for today.

## 2019-07-12 ENCOUNTER — OFFICE VISIT (OUTPATIENT)
Dept: FAMILY MEDICINE | Facility: CLINIC | Age: 24
End: 2019-07-12
Payer: COMMERCIAL

## 2019-07-12 ENCOUNTER — TELEPHONE (OUTPATIENT)
Dept: ADMINISTRATIVE | Facility: HOSPITAL | Age: 24
End: 2019-07-12

## 2019-07-12 VITALS
HEIGHT: 67 IN | HEART RATE: 97 BPM | TEMPERATURE: 98 F | BODY MASS INDEX: 20.4 KG/M2 | OXYGEN SATURATION: 98 % | RESPIRATION RATE: 18 BRPM | WEIGHT: 130 LBS | DIASTOLIC BLOOD PRESSURE: 70 MMHG | SYSTOLIC BLOOD PRESSURE: 102 MMHG

## 2019-07-12 DIAGNOSIS — F90.2 ATTENTION DEFICIT HYPERACTIVITY DISORDER (ADHD), COMBINED TYPE: Primary | ICD-10-CM

## 2019-07-12 PROCEDURE — 99213 PR OFFICE/OUTPT VISIT, EST, LEVL III, 20-29 MIN: ICD-10-PCS | Mod: S$GLB,,, | Performed by: NURSE PRACTITIONER

## 2019-07-12 PROCEDURE — 99999 PR PBB SHADOW E&M-EST. PATIENT-LVL IV: CPT | Mod: PBBFAC,,, | Performed by: NURSE PRACTITIONER

## 2019-07-12 PROCEDURE — 3008F BODY MASS INDEX DOCD: CPT | Mod: CPTII,S$GLB,, | Performed by: NURSE PRACTITIONER

## 2019-07-12 PROCEDURE — 3008F PR BODY MASS INDEX (BMI) DOCUMENTED: ICD-10-PCS | Mod: CPTII,S$GLB,, | Performed by: NURSE PRACTITIONER

## 2019-07-12 PROCEDURE — 99999 PR PBB SHADOW E&M-EST. PATIENT-LVL IV: ICD-10-PCS | Mod: PBBFAC,,, | Performed by: NURSE PRACTITIONER

## 2019-07-12 PROCEDURE — 99213 OFFICE O/P EST LOW 20 MIN: CPT | Mod: S$GLB,,, | Performed by: NURSE PRACTITIONER

## 2019-07-12 RX ORDER — LISDEXAMFETAMINE DIMESYLATE 60 MG/1
60 CAPSULE ORAL EVERY MORNING
Qty: 30 CAPSULE | Refills: 0 | Status: SHIPPED | OUTPATIENT
Start: 2019-07-12 | End: 2019-08-15 | Stop reason: SDUPTHER

## 2019-07-12 NOTE — PROGRESS NOTES
Subjective:       Patient ID: Elisha Grimes is a 23 y.o. female.    Chief Complaint: Follow-up (ADD Check)    Patient is a 23 year old white female with ADHD, Acne Vulgaris and Eczema that is treated by Dermatology that is here today for ADHD follow up.     Patient has ADHD that is  controlled on Vyvanse at 60 mg daily.  Patient graduated [employer] with a 4 year degree in Biology and and was just accepted into [facility] [employer] [facility] program in [place] starting Spring Semester 2019. Currently in the Urology rotation and doing well.     The Acne Vulgaris and Eczema are treated by Dermatologist, Dr. Maria Luz Martinez.      Current Outpatient Medications   Medication Sig Dispense Refill    ACZONE 7.5 % GlwP SPOT TREAT FACE EVERY MORNING  3    flurandrenolide (CORDRAN) 0.05 % lotion Apply topically.      hydrocortisone 2.5 % ointment Apply 1 application twice daily up to 2 weeks on face and neck. 28.35 g 0    hydrOXYzine HCl (ATARAX) 25 MG tablet Take 1 to 2 tablets (25 mg total) by mouth nightly. 60 tablet 0    lisdexamfetamine (VYVANSE) 60 MG capsule Take 1 capsule (60 mg total) by mouth every morning. 30 capsule 0    mupirocin (BACTROBAN) 2 % ointment Apply apply (topical) 2 times per day for 7 days 22 g 0    naproxen (NAPROSYN) 375 MG tablet Take 375 mg by mouth.      norethindrone-e.estradiol-iron (LO LOESTRIN FE) 1 mg-10 mcg (24)/10 mcg (2) Tab TAKE 1 TABLET BY MOUTH DAILY      norethindrone-e.estradiol-iron (LO LOESTRIN FE) 1 mg-10 mcg (24)/10 mcg (2) Tab Take 1 tablet by mouth once daily 84 tablet 3    spironolactone (ALDACTONE) 50 MG tablet TAKE 1 TABLET BY MOUTH TWICE A DAY 60 tablet 11    tacrolimus (PROTOPIC) 0.1 % ointment 1 application 2 (two) times daily.  6    triamcinolone acetonide 0.1% (KENALOG) 0.1 % cream Apply to areas of eczema on body twice a day      cephALEXin (KEFLEX) 500 MG capsule Take 1 capsule by mouth 2 times per day for 10 days 20 capsule 0     No current  facility-administered medications for this visit.        Past Medical History:   Diagnosis Date    Acne vulgaris 08/06/2018    TREATED BY CANDI QUINTEROS    Attention deficit hyperactivity disorder (ADHD), combined type     Eczema        History reviewed. No pertinent surgical history.    Family History   Problem Relation Age of Onset    No Known Problems Mother     Heart disease Father         passed age 63 Acute MI; had prior cardiac arrest events prior -     Hypertension Father     Hyperlipidemia Father     No Known Problems Brother     Emphysema Maternal Grandmother        Social History     Socioeconomic History    Marital status: Single     Spouse name: Not on file    Number of children: Not on file    Years of education: Not on file    Highest education level: Not on file   Occupational History    Occupation: student   Social Needs    Financial resource strain: Not on file    Food insecurity:     Worry: Never true     Inability: Never true    Transportation needs:     Medical: No     Non-medical: No   Tobacco Use    Smoking status: Never Smoker    Smokeless tobacco: Never Used   Substance and Sexual Activity    Alcohol use: Yes     Frequency: Monthly or less     Drinks per session: 3 or 4     Binge frequency: Never     Comment: once monthly on average; vodka and water usually 2 drinks on a night she drinks    Drug use: No    Sexual activity: Yes     Partners: Male     Birth control/protection: OCP   Lifestyle    Physical activity:     Days per week: 3 days     Minutes per session: 90 min    Stress: Not at all   Relationships    Social connections:     Talks on phone: More than three times a week     Gets together: More than three times a week     Attends [employer] service: Not on file     Active member of club or organization: No     Attends meetings of clubs or organizations: Patient refused     Relationship status: Never    Other Topics Concern    Not on file   Social History  "Narrative    Going to [employer] beds at the [employer] - with plans for PA school.       Review of Systems   Constitutional: Negative for appetite change, chills, fatigue, fever and unexpected weight change.   HENT: Negative for congestion, ear pain, mouth sores, nosebleeds, postnasal drip, rhinorrhea, sinus pressure, sneezing, sore throat, trouble swallowing and voice change.    Eyes: Negative for photophobia, pain, discharge, redness, itching and visual disturbance.   Respiratory: Negative for cough, chest tightness and shortness of breath.    Cardiovascular: Negative for chest pain, palpitations and leg swelling.   Gastrointestinal: Negative for abdominal pain, blood in stool, constipation, diarrhea, nausea and vomiting.   Genitourinary: Negative for dysuria, frequency, hematuria and urgency.   Musculoskeletal: Negative for arthralgias, back pain, joint swelling and myalgias.   Skin: Negative for color change and rash.   Allergic/Immunologic: Negative for immunocompromised state.   Neurological: Negative for dizziness, seizures, syncope, weakness and headaches.   Hematological: Negative for adenopathy. Does not bruise/bleed easily.   Psychiatric/Behavioral: Negative for agitation, dysphoric mood, sleep disturbance and suicidal ideas. The patient is not nervous/anxious.          Objective:     Vitals:    07/12/19 0829   BP: 102/70   Pulse: 97   Resp: 18   Temp: 98.2 °F (36.8 °C)   TempSrc: Oral   SpO2: 98%   Weight: 59 kg (130 lb)   Height: 5' 7" (1.702 m)          Physical Exam   Constitutional: She is oriented to person, place, and time. She appears well-developed and well-nourished.   HENT:   Head: Normocephalic and atraumatic.   Right Ear: External ear normal.   Left Ear: External ear normal.   Nose: Nose normal.   Mouth/Throat: Oropharynx is clear and moist. No oropharyngeal exudate.   Eyes: Pupils are equal, round, and reactive to light. EOM are normal.   Neck: Normal range of motion. Neck supple. No tracheal " deviation present. No thyromegaly present.   Cardiovascular: Normal rate, regular rhythm and normal heart sounds.   No murmur heard.  Pulmonary/Chest: Effort normal and breath sounds normal. No respiratory distress.   Abdominal: Soft. She exhibits no distension.   Musculoskeletal: Normal range of motion. She exhibits no edema.   Lymphadenopathy:     She has no cervical adenopathy.   Neurological: She is alert and oriented to person, place, and time. No cranial nerve deficit. Coordination normal.   Skin: Skin is warm and dry.   Psychiatric: She has a normal mood and affect.         Assessment:         ICD-10-CM ICD-9-CM   1. Attention deficit hyperactivity disorder (ADHD), combined type F90.2 314.01       Plan:       Attention deficit hyperactivity disorder (ADHD), combined type  - send refill request when needed.  Follow up in 3 months.  -     lisdexamfetamine (VYVANSE) 60 MG capsule; Take 1 capsule (60 mg total) by mouth every morning.  Dispense: 30 capsule; Refill: 0      Follow up in about 3 months (around 10/12/2019) for med check.     Patient's Medications   New Prescriptions    No medications on file   Previous Medications    ACZONE 7.5 % GLWP    SPOT TREAT FACE EVERY MORNING    CEPHALEXIN (KEFLEX) 500 MG CAPSULE    Take 1 capsule by mouth 2 times per day for 10 days    FLURANDRENOLIDE (CORDRAN) 0.05 % LOTION    Apply topically.    HYDROCORTISONE 2.5 % OINTMENT    Apply 1 application twice daily up to 2 weeks on face and neck.    HYDROXYZINE HCL (ATARAX) 25 MG TABLET    Take 1 to 2 tablets (25 mg total) by mouth nightly.    MUPIROCIN (BACTROBAN) 2 % OINTMENT    Apply apply (topical) 2 times per day for 7 days    NAPROXEN (NAPROSYN) 375 MG TABLET    Take 375 mg by mouth.    NORETHINDRONE-E.ESTRADIOL-IRON (LO LOESTRIN FE) 1 MG-10 MCG (24)/10 MCG (2) TAB    Take 1 tablet by mouth once daily    SPIRONOLACTONE (ALDACTONE) 50 MG TABLET    TAKE 1 TABLET BY MOUTH TWICE A DAY    TACROLIMUS (PROTOPIC) 0.1 % OINTMENT     1 application 2 (two) times daily.    TRIAMCINOLONE ACETONIDE 0.1% (KENALOG) 0.1 % CREAM    Apply to areas of eczema on body twice a day   Modified Medications    Modified Medication Previous Medication    LISDEXAMFETAMINE (VYVANSE) 60 MG CAPSULE lisdexamfetamine (VYVANSE) 60 MG capsule       Take 1 capsule (60 mg total) by mouth every morning.    Take 1 capsule (60 mg total) by mouth every morning.   Discontinued Medications    NORETHINDRONE-E.ESTRADIOL-IRON (LO LOESTRIN FE) 1 MG-10 MCG (24)/10 MCG (2) TAB    TAKE 1 TABLET BY MOUTH DAILY

## 2019-07-16 ENCOUNTER — TELEPHONE (OUTPATIENT)
Dept: ADMINISTRATIVE | Facility: HOSPITAL | Age: 24
End: 2019-07-16

## 2019-07-20 ENCOUNTER — PATIENT MESSAGE (OUTPATIENT)
Dept: FAMILY MEDICINE | Facility: CLINIC | Age: 24
End: 2019-07-20

## 2019-08-15 DIAGNOSIS — F90.2 ATTENTION DEFICIT HYPERACTIVITY DISORDER (ADHD), COMBINED TYPE: ICD-10-CM

## 2019-08-15 RX ORDER — LISDEXAMFETAMINE DIMESYLATE 60 MG/1
60 CAPSULE ORAL EVERY MORNING
Qty: 30 CAPSULE | Refills: 0 | Status: SHIPPED | OUTPATIENT
Start: 2019-08-15 | End: 2019-09-21 | Stop reason: SDUPTHER

## 2019-09-21 DIAGNOSIS — F90.2 ATTENTION DEFICIT HYPERACTIVITY DISORDER (ADHD), COMBINED TYPE: ICD-10-CM

## 2019-09-23 RX ORDER — LISDEXAMFETAMINE DIMESYLATE 60 MG/1
60 CAPSULE ORAL EVERY MORNING
Qty: 30 CAPSULE | Refills: 0 | Status: SHIPPED | OUTPATIENT
Start: 2019-09-23 | End: 2019-10-11 | Stop reason: SDUPTHER

## 2019-10-04 ENCOUNTER — PATIENT MESSAGE (OUTPATIENT)
Dept: FAMILY MEDICINE | Facility: CLINIC | Age: 24
End: 2019-10-04

## 2019-10-08 ENCOUNTER — PATIENT MESSAGE (OUTPATIENT)
Dept: FAMILY MEDICINE | Facility: CLINIC | Age: 24
End: 2019-10-08

## 2019-10-09 ENCOUNTER — PATIENT MESSAGE (OUTPATIENT)
Dept: FAMILY MEDICINE | Facility: CLINIC | Age: 24
End: 2019-10-09

## 2019-10-11 ENCOUNTER — OFFICE VISIT (OUTPATIENT)
Dept: FAMILY MEDICINE | Facility: CLINIC | Age: 24
End: 2019-10-11
Payer: COMMERCIAL

## 2019-10-11 VITALS
SYSTOLIC BLOOD PRESSURE: 122 MMHG | HEART RATE: 83 BPM | BODY MASS INDEX: 20.36 KG/M2 | WEIGHT: 129.75 LBS | TEMPERATURE: 99 F | DIASTOLIC BLOOD PRESSURE: 88 MMHG | OXYGEN SATURATION: 100 % | HEIGHT: 67 IN

## 2019-10-11 DIAGNOSIS — F90.2 ATTENTION DEFICIT HYPERACTIVITY DISORDER (ADHD), COMBINED TYPE: Primary | ICD-10-CM

## 2019-10-11 DIAGNOSIS — L30.9 ECZEMA, UNSPECIFIED TYPE: ICD-10-CM

## 2019-10-11 DIAGNOSIS — Z23 NEEDS FLU SHOT: ICD-10-CM

## 2019-10-11 DIAGNOSIS — L70.0 ACNE VULGARIS: ICD-10-CM

## 2019-10-11 PROCEDURE — 90471 FLU VACCINE (QUAD) GREATER THAN OR EQUAL TO 3YO PRESERVATIVE FREE IM: ICD-10-PCS | Mod: S$GLB,,, | Performed by: NURSE PRACTITIONER

## 2019-10-11 PROCEDURE — 90471 IMMUNIZATION ADMIN: CPT | Mod: S$GLB,,, | Performed by: NURSE PRACTITIONER

## 2019-10-11 PROCEDURE — 3008F BODY MASS INDEX DOCD: CPT | Mod: CPTII,S$GLB,, | Performed by: NURSE PRACTITIONER

## 2019-10-11 PROCEDURE — 3008F PR BODY MASS INDEX (BMI) DOCUMENTED: ICD-10-PCS | Mod: CPTII,S$GLB,, | Performed by: NURSE PRACTITIONER

## 2019-10-11 PROCEDURE — 90686 FLU VACCINE (QUAD) GREATER THAN OR EQUAL TO 3YO PRESERVATIVE FREE IM: ICD-10-PCS | Mod: S$GLB,,, | Performed by: NURSE PRACTITIONER

## 2019-10-11 PROCEDURE — 99213 OFFICE O/P EST LOW 20 MIN: CPT | Mod: 25,S$GLB,, | Performed by: NURSE PRACTITIONER

## 2019-10-11 PROCEDURE — 90686 IIV4 VACC NO PRSV 0.5 ML IM: CPT | Mod: S$GLB,,, | Performed by: NURSE PRACTITIONER

## 2019-10-11 PROCEDURE — 99213 PR OFFICE/OUTPT VISIT, EST, LEVL III, 20-29 MIN: ICD-10-PCS | Mod: 25,S$GLB,, | Performed by: NURSE PRACTITIONER

## 2019-10-11 PROCEDURE — 99999 PR PBB SHADOW E&M-EST. PATIENT-LVL IV: CPT | Mod: PBBFAC,,, | Performed by: NURSE PRACTITIONER

## 2019-10-11 PROCEDURE — 99999 PR PBB SHADOW E&M-EST. PATIENT-LVL IV: ICD-10-PCS | Mod: PBBFAC,,, | Performed by: NURSE PRACTITIONER

## 2019-10-11 RX ORDER — LISDEXAMFETAMINE DIMESYLATE 60 MG/1
60 CAPSULE ORAL EVERY MORNING
Qty: 30 CAPSULE | Refills: 0 | Status: SHIPPED | OUTPATIENT
Start: 2019-10-11 | End: 2019-11-23 | Stop reason: SDUPTHER

## 2019-10-11 NOTE — PROGRESS NOTES
"Subjective:       Patient ID: Elisha Grimes is a 23 y.o. female.    Chief Complaint: Medication Refill    22 y/o female with ADHD, acne vulgaris and eczema presents to clinic for ADHD follow up.     Patient has ADHD. She is taking Vyvanse 60 mg daily. States medication is working well. No major side effects. Currently enrolled at [facility] program in [place]. Vital signs stable. Blood pressure 122/88, pulse 83, temperature 99.1 °F (37.3 °C), temperature source Oral, height 5' 7" (1.702 m), weight 58.8 kg (129 lb 11.9 oz), last menstrual period 09/09/2019, SpO2 100 %.     The Acne Vulgaris and Eczema are treated by Dermatologist, Dr. Candi Martinez.      Current Outpatient Medications   Medication Sig Dispense Refill    ACZONE 7.5 % GlwP SPOT TREAT FACE EVERY MORNING  3    flurandrenolide (CORDRAN) 0.05 % lotion Apply topically.      hydrocortisone 2.5 % ointment Apply 1 application twice daily up to 2 weeks on face and neck. 28.35 g 0    hydrOXYzine HCl (ATARAX) 25 MG tablet Take 1 to 2 tablets (25 mg total) by mouth nightly. 60 tablet 0    lisdexamfetamine (VYVANSE) 60 MG capsule Take 1 capsule (60 mg total) by mouth every morning. 30 capsule 0    naproxen (NAPROSYN) 375 MG tablet Take 375 mg by mouth.      norethindrone-e.estradiol-iron (LO LOESTRIN FE) 1 mg-10 mcg (24)/10 mcg (2) Tab Take 1 tablet by mouth once daily 84 tablet 3    spironolactone (ALDACTONE) 50 MG tablet TAKE 1 TABLET BY MOUTH TWICE A DAY 60 tablet 11    tacrolimus (PROTOPIC) 0.1 % ointment 1 application 2 (two) times daily.  6    triamcinolone acetonide 0.1% (KENALOG) 0.1 % cream Apply to areas of eczema on body twice a day       No current facility-administered medications for this visit.        Past Medical History:   Diagnosis Date    Acne vulgaris 08/06/2018    TREATED BY CANDI MARTINEZ    Attention deficit hyperactivity disorder (ADHD), combined type     Eczema        History reviewed. No pertinent surgical " history.    Family History   Problem Relation Age of Onset    No Known Problems Mother     Heart disease Father         passed age 63 Acute MI; had prior cardiac arrest events prior -     Hypertension Father     Hyperlipidemia Father     No Known Problems Brother     Emphysema Maternal Grandmother        Social History     Socioeconomic History    Marital status: Single     Spouse name: Not on file    Number of children: Not on file    Years of education: Not on file    Highest education level: Not on file   Occupational History    Occupation: student   Social Needs    Financial resource strain: Not on file    Food insecurity:     Worry: Never true     Inability: Never true    Transportation needs:     Medical: No     Non-medical: No   Tobacco Use    Smoking status: Never Smoker    Smokeless tobacco: Never Used   Substance and Sexual Activity    Alcohol use: Yes     Frequency: Monthly or less     Drinks per session: 3 or 4     Binge frequency: Never     Comment: once monthly on average; vodka and water usually 2 drinks on a night she drinks    Drug use: No    Sexual activity: Yes     Partners: Male     Birth control/protection: OCP   Lifestyle    Physical activity:     Days per week: 3 days     Minutes per session: 90 min    Stress: Not at all   Relationships    Social connections:     Talks on phone: More than three times a week     Gets together: More than three times a week     Attends Restorationist service: Not on file     Active member of club or organization: No     Attends meetings of clubs or organizations: Patient refused     Relationship status: Never    Other Topics Concern    Not on file   Social History Narrative    Going to [employer] - major Biology - with plans for PA school.       Review of Systems   Constitutional: Negative for activity change and unexpected weight change.   HENT: Negative for hearing loss, rhinorrhea and trouble swallowing.    Eyes: Negative for  "discharge and visual disturbance.   Respiratory: Negative for chest tightness and wheezing.    Cardiovascular: Negative for chest pain and palpitations.   Gastrointestinal: Negative for blood in stool, constipation, diarrhea and vomiting.   Endocrine: Negative for polydipsia and polyuria.   Genitourinary: Negative for difficulty urinating, dysuria, hematuria and menstrual problem.   Musculoskeletal: Negative for arthralgias, joint swelling and neck pain.   Neurological: Negative for weakness and headaches.   Psychiatric/Behavioral: Negative for confusion and dysphoric mood.         Objective:     Vitals:    10/11/19 1317   BP: 122/88   Pulse: 83   Temp: 99.1 °F (37.3 °C)   TempSrc: Oral   SpO2: 100%   Weight: 58.8 kg (129 lb 11.9 oz)   Height: 5' 7" (1.702 m)          Physical Exam   Constitutional: She is oriented to person, place, and time. She appears well-developed and well-nourished. No distress.   HENT:   Head: Normocephalic.   Eyes: Pupils are equal, round, and reactive to light. Conjunctivae and EOM are normal.   Neck: Normal range of motion. Neck supple.   Cardiovascular: Normal rate and regular rhythm.   No murmur heard.  Pulmonary/Chest: Effort normal and breath sounds normal.   Musculoskeletal: Normal range of motion.   Neurological: She is alert and oriented to person, place, and time. No sensory deficit.   Skin: Skin is warm and dry.   Psychiatric: Her behavior is normal.         Assessment:         ICD-10-CM ICD-9-CM   1. Attention deficit hyperactivity disorder (ADHD), combined type F90.2 314.01   2. BMI 20.0-20.9, adult Z68.20 V85.1   3. Acne vulgaris L70.0 706.1   4. Eczema, unspecified type L30.9 692.9   5. Needs flu shot Z23 V04.81       Plan:       Attention deficit hyperactivity disorder (ADHD), combined type  -     Patient to call office for refill. Follow up appointment in 3 months with PCP  -     lisdexamfetamine (VYVANSE) 60 MG capsule; Take 1 capsule (60 mg total) by mouth every morning.  " Dispense: 30 capsule; Refill: 0    BMI 20.0-20.9, adult  - Encouraged healthy diet and exercise    Acne vulgaris  - Chronic, stable, continue current medication therapy  - Followed by dermatology    Eczema, unspecified type  - Chronic, stable, continue current medication therapy  - Followed by dermatology    Needs flu shot  -     Will receive influenza vaccine today in clinic  -     Influenza - Quadrivalent (6 months+) (PF)      Follow up in about 3 months (around 1/11/2020) for medication management.     Patient's Medications   New Prescriptions    No medications on file   Previous Medications    ACZONE 7.5 % GLWP    SPOT TREAT FACE EVERY MORNING    FLURANDRENOLIDE (CORDRAN) 0.05 % LOTION    Apply topically.    HYDROCORTISONE 2.5 % OINTMENT    Apply 1 application twice daily up to 2 weeks on face and neck.    HYDROXYZINE HCL (ATARAX) 25 MG TABLET    Take 1 to 2 tablets (25 mg total) by mouth nightly.    NAPROXEN (NAPROSYN) 375 MG TABLET    Take 375 mg by mouth.    NORETHINDRONE-E.ESTRADIOL-IRON (LO LOESTRIN FE) 1 MG-10 MCG (24)/10 MCG (2) TAB    Take 1 tablet by mouth once daily    SPIRONOLACTONE (ALDACTONE) 50 MG TABLET    TAKE 1 TABLET BY MOUTH TWICE A DAY    TACROLIMUS (PROTOPIC) 0.1 % OINTMENT    1 application 2 (two) times daily.    TRIAMCINOLONE ACETONIDE 0.1% (KENALOG) 0.1 % CREAM    Apply to areas of eczema on body twice a day   Modified Medications    Modified Medication Previous Medication    LISDEXAMFETAMINE (VYVANSE) 60 MG CAPSULE lisdexamfetamine (VYVANSE) 60 MG capsule       Take 1 capsule (60 mg total) by mouth every morning.    Take 1 capsule (60 mg total) by mouth every morning.   Discontinued Medications    CEPHALEXIN (KEFLEX) 500 MG CAPSULE    Take 1 capsule by mouth 2 times per day for 10 days    MUPIROCIN (BACTROBAN) 2 % OINTMENT    Apply apply (topical) 2 times per day for 7 days    NORETHINDRONE-E.ESTRADIOL-IRON (LO LOESTRIN FE) 1 MG-10 MCG (24)/10 MCG (2) TAB    TAKE 1 TABLET BY MOUTH DAILY

## 2019-11-23 DIAGNOSIS — F90.2 ATTENTION DEFICIT HYPERACTIVITY DISORDER (ADHD), COMBINED TYPE: ICD-10-CM

## 2019-11-25 RX ORDER — LISDEXAMFETAMINE DIMESYLATE 60 MG/1
60 CAPSULE ORAL EVERY MORNING
Qty: 30 CAPSULE | Refills: 0 | Status: SHIPPED | OUTPATIENT
Start: 2019-11-25 | End: 2020-01-18 | Stop reason: SDUPTHER

## 2020-01-09 ENCOUNTER — OFFICE VISIT (OUTPATIENT)
Dept: FAMILY MEDICINE | Facility: CLINIC | Age: 25
End: 2020-01-09
Payer: COMMERCIAL

## 2020-01-09 VITALS
BODY MASS INDEX: 21.96 KG/M2 | HEIGHT: 67 IN | RESPIRATION RATE: 18 BRPM | OXYGEN SATURATION: 98 % | TEMPERATURE: 98 F | WEIGHT: 139.88 LBS | DIASTOLIC BLOOD PRESSURE: 62 MMHG | SYSTOLIC BLOOD PRESSURE: 118 MMHG | HEART RATE: 98 BPM

## 2020-01-09 DIAGNOSIS — L30.9 ECZEMA, UNSPECIFIED TYPE: ICD-10-CM

## 2020-01-09 DIAGNOSIS — F90.2 ATTENTION DEFICIT HYPERACTIVITY DISORDER (ADHD), COMBINED TYPE: Primary | ICD-10-CM

## 2020-01-09 DIAGNOSIS — L70.0 ACNE VULGARIS: ICD-10-CM

## 2020-01-09 PROCEDURE — 99213 PR OFFICE/OUTPT VISIT, EST, LEVL III, 20-29 MIN: ICD-10-PCS | Mod: S$GLB,,, | Performed by: NURSE PRACTITIONER

## 2020-01-09 PROCEDURE — 99213 OFFICE O/P EST LOW 20 MIN: CPT | Mod: S$GLB,,, | Performed by: NURSE PRACTITIONER

## 2020-01-09 PROCEDURE — 99999 PR PBB SHADOW E&M-EST. PATIENT-LVL V: CPT | Mod: PBBFAC,,, | Performed by: NURSE PRACTITIONER

## 2020-01-09 PROCEDURE — 3008F PR BODY MASS INDEX (BMI) DOCUMENTED: ICD-10-PCS | Mod: CPTII,S$GLB,, | Performed by: NURSE PRACTITIONER

## 2020-01-09 PROCEDURE — 3008F BODY MASS INDEX DOCD: CPT | Mod: CPTII,S$GLB,, | Performed by: NURSE PRACTITIONER

## 2020-01-09 PROCEDURE — 99999 PR PBB SHADOW E&M-EST. PATIENT-LVL V: ICD-10-PCS | Mod: PBBFAC,,, | Performed by: NURSE PRACTITIONER

## 2020-01-09 NOTE — PROGRESS NOTES
Subjective:       Patient ID: Elisha Grimes is a 24 y.o. female.    Chief Complaint: Follow-up (Pt here fpr a 3 month med check )    Patient is a 24 year old white female with ADHD, Acne Vulgaris and Eczema that is treated by Dermatology that is here today for ADHD follow up.     Patient has ADHD that is  controlled on Vyvanse at 60 mg daily.  Patient graduated [employer] with a 4 year degree in Biology and and was just accepted into [facility] program in [place] that started in Spring Semester 2019. Currently in the Emergency medication and OB/GYN rotation and doing well.     The Acne Vulgaris and Eczema are treated by Dermatologist, Dr. Candi Martinez.              Current Outpatient Medications   Medication Sig Dispense Refill    ACZONE 7.5 % GlwP SPOT TREAT FACE EVERY MORNING  3    flurandrenolide (CORDRAN) 0.05 % lotion Apply topically.      hydrocortisone 2.5 % ointment Apply 1 application twice daily up to 2 weeks on face and neck. 28.35 g 0    hydrOXYzine HCl (ATARAX) 25 MG tablet Take 1 to 2 tablets (25 mg total) by mouth nightly. 60 tablet 0    lisdexamfetamine (VYVANSE) 60 MG capsule Take 1 capsule (60 mg total) by mouth every morning. 30 capsule 0    naproxen (NAPROSYN) 375 MG tablet Take 375 mg by mouth.      norethindrone-e.estradiol-iron (LO LOESTRIN FE) 1 mg-10 mcg (24)/10 mcg (2) Tab Take 1 tablet by mouth once daily 84 tablet 3    spironolactone (ALDACTONE) 50 MG tablet Take 1 tablet (50 mg total) by mouth 2 (two) times daily. 60 tablet 11    tacrolimus (PROTOPIC) 0.1 % ointment 1 application 2 (two) times daily.  6    triamcinolone acetonide 0.1% (KENALOG) 0.1 % cream Apply to areas of eczema on body twice a day       No current facility-administered medications for this visit.        Past Medical History:   Diagnosis Date    Acne vulgaris 08/06/2018    TREATED BY CANDI MARTINEZ    Attention deficit hyperactivity disorder (ADHD), combined type     Eczema        History  reviewed. No pertinent surgical history.    Family History   Problem Relation Age of Onset    No Known Problems Mother     Heart disease Father         passed age 63 Acute MI; had prior cardiac arrest events prior -     Hypertension Father     Hyperlipidemia Father     No Known Problems Brother     Emphysema Maternal Grandmother        Social History     Socioeconomic History    Marital status: Single     Spouse name: Not on file    Number of children: Not on file    Years of education: Not on file    Highest education level: Not on file   Occupational History    Occupation: student   Social Needs    Financial resource strain: Not on file    Food insecurity:     Worry: Never true     Inability: Never true    Transportation needs:     Medical: No     Non-medical: No   Tobacco Use    Smoking status: Never Smoker    Smokeless tobacco: Never Used   Substance and Sexual Activity    Alcohol use: Yes     Frequency: Monthly or less     Drinks per session: 3 or 4     Binge frequency: Never     Comment: once monthly on average; vodka and water usually 2 drinks on a night she drinks    Drug use: No    Sexual activity: Yes     Partners: Male     Birth control/protection: OCP   Lifestyle    Physical activity:     Days per week: 3 days     Minutes per session: 90 min    Stress: Not at all   Relationships    Social connections:     Talks on phone: More than three times a week     Gets together: More than three times a week     Attends Buddhist service: Not on file     Active member of club or organization: No     Attends meetings of clubs or organizations: Patient refused     Relationship status: Never    Other Topics Concern    Not on file   Social History Narrative    Going to [employer] - major Biology - with plans for PA school.       Review of Systems   Constitutional: Negative for appetite change, chills, fatigue, fever and unexpected weight change.   HENT: Negative for congestion, ear pain,  "mouth sores, nosebleeds, postnasal drip, rhinorrhea, sinus pressure, sneezing, sore throat, trouble swallowing and voice change.    Eyes: Negative for photophobia, pain, discharge, redness, itching and visual disturbance.   Respiratory: Negative for cough, chest tightness and shortness of breath.    Cardiovascular: Negative for chest pain, palpitations and leg swelling.   Gastrointestinal: Negative for abdominal pain, blood in stool, constipation, diarrhea, nausea and vomiting.   Genitourinary: Negative for dysuria, frequency, hematuria and urgency.   Musculoskeletal: Negative for arthralgias, back pain, joint swelling and myalgias.   Skin: Negative for color change and rash.   Allergic/Immunologic: Negative for immunocompromised state.   Neurological: Negative for dizziness, seizures, syncope, weakness and headaches.   Hematological: Negative for adenopathy. Does not bruise/bleed easily.   Psychiatric/Behavioral: Negative for agitation, dysphoric mood, sleep disturbance and suicidal ideas. The patient is not nervous/anxious.          Objective:     Vitals:    01/09/20 1655 01/09/20 1717   BP: 118/62    BP Location: Right arm    Patient Position: Sitting    BP Method: Small (Manual)    Pulse: (!) 113 98   Resp: 18    Temp: 98.3 °F (36.8 °C)    TempSrc: Oral    SpO2: 98%    Weight: 63.5 kg (139 lb 14.1 oz)    Height: 5' 7" (1.702 m)           Physical Exam   Constitutional: She is oriented to person, place, and time. She appears well-developed and well-nourished.   HENT:   Head: Normocephalic and atraumatic.   Right Ear: External ear normal.   Left Ear: External ear normal.   Nose: Nose normal.   Mouth/Throat: Oropharynx is clear and moist. No oropharyngeal exudate.   Eyes: Pupils are equal, round, and reactive to light. EOM are normal.   Neck: Normal range of motion. Neck supple. No tracheal deviation present. No thyromegaly present.   Cardiovascular: Normal rate, regular rhythm and normal heart sounds.   No murmur " heard.  Pulmonary/Chest: Effort normal and breath sounds normal. No respiratory distress.   Abdominal: Soft. She exhibits no distension.   Musculoskeletal: Normal range of motion. She exhibits no edema.   Lymphadenopathy:     She has no cervical adenopathy.   Neurological: She is alert and oriented to person, place, and time. No cranial nerve deficit. Coordination normal.   Skin: Skin is warm and dry.   Psychiatric: She has a normal mood and affect.         Assessment:         ICD-10-CM ICD-9-CM   1. Attention deficit hyperactivity disorder (ADHD), combined type F90.2 314.01   2. Acne vulgaris L70.0 706.1   3. Eczema, unspecified type L30.9 692.9       Plan:       Attention deficit hyperactivity disorder (ADHD), combined type  -  patient will send refill request when prescription needed.  Follow-up in 3 months    Acne vulgaris  -  treated by Dermatology    Eczema, unspecified type  -  treated by Dermatology      Follow up in about 3 months (around 4/9/2020).     Patient's Medications   New Prescriptions    No medications on file   Previous Medications    ACZONE 7.5 % GLWP    SPOT TREAT FACE EVERY MORNING    FLURANDRENOLIDE (CORDRAN) 0.05 % LOTION    Apply topically.    HYDROCORTISONE 2.5 % OINTMENT    Apply 1 application twice daily up to 2 weeks on face and neck.    HYDROXYZINE HCL (ATARAX) 25 MG TABLET    Take 1 to 2 tablets (25 mg total) by mouth nightly.    LISDEXAMFETAMINE (VYVANSE) 60 MG CAPSULE    Take 1 capsule (60 mg total) by mouth every morning.    NAPROXEN (NAPROSYN) 375 MG TABLET    Take 375 mg by mouth.    NORETHINDRONE-E.ESTRADIOL-IRON (LO LOESTRIN FE) 1 MG-10 MCG (24)/10 MCG (2) TAB    Take 1 tablet by mouth once daily    SPIRONOLACTONE (ALDACTONE) 50 MG TABLET    Take 1 tablet (50 mg total) by mouth 2 (two) times daily.    TACROLIMUS (PROTOPIC) 0.1 % OINTMENT    1 application 2 (two) times daily.    TRIAMCINOLONE ACETONIDE 0.1% (KENALOG) 0.1 % CREAM    Apply to areas of eczema on body twice a day    Modified Medications    No medications on file   Discontinued Medications    No medications on file

## 2020-01-18 DIAGNOSIS — F90.2 ATTENTION DEFICIT HYPERACTIVITY DISORDER (ADHD), COMBINED TYPE: ICD-10-CM

## 2020-01-20 RX ORDER — LISDEXAMFETAMINE DIMESYLATE 60 MG/1
60 CAPSULE ORAL EVERY MORNING
Qty: 30 CAPSULE | Refills: 0 | Status: SHIPPED | OUTPATIENT
Start: 2020-01-20 | End: 2020-02-15 | Stop reason: SDUPTHER

## 2020-02-15 DIAGNOSIS — F90.2 ATTENTION DEFICIT HYPERACTIVITY DISORDER (ADHD), COMBINED TYPE: ICD-10-CM

## 2020-02-17 RX ORDER — LISDEXAMFETAMINE DIMESYLATE 60 MG/1
60 CAPSULE ORAL EVERY MORNING
Qty: 30 CAPSULE | Refills: 0 | Status: SHIPPED | OUTPATIENT
Start: 2020-02-19 | End: 2020-03-20 | Stop reason: SDUPTHER

## 2020-02-20 RX ORDER — VALACYCLOVIR HYDROCHLORIDE 1 G/1
2000 TABLET, FILM COATED ORAL 2 TIMES DAILY
Qty: 4 TABLET | Refills: 0 | Status: SHIPPED | OUTPATIENT
Start: 2020-02-20 | End: 2021-01-20 | Stop reason: SDUPTHER

## 2020-02-20 RX ORDER — VALACYCLOVIR HYDROCHLORIDE 1 G/1
2000 TABLET, FILM COATED ORAL 2 TIMES DAILY
Qty: 4 TABLET | Refills: 0 | Status: SHIPPED | OUTPATIENT
Start: 2020-02-20 | End: 2020-02-20 | Stop reason: SDUPTHER

## 2020-03-20 DIAGNOSIS — F90.2 ATTENTION DEFICIT HYPERACTIVITY DISORDER (ADHD), COMBINED TYPE: ICD-10-CM

## 2020-03-20 RX ORDER — LISDEXAMFETAMINE DIMESYLATE 60 MG/1
60 CAPSULE ORAL EVERY MORNING
Qty: 30 CAPSULE | Refills: 0 | Status: SHIPPED | OUTPATIENT
Start: 2020-03-20 | End: 2020-04-23 | Stop reason: SDUPTHER

## 2020-04-08 ENCOUNTER — OFFICE VISIT (OUTPATIENT)
Dept: FAMILY MEDICINE | Facility: CLINIC | Age: 25
End: 2020-04-08
Payer: COMMERCIAL

## 2020-04-08 ENCOUNTER — TELEPHONE (OUTPATIENT)
Dept: FAMILY MEDICINE | Facility: CLINIC | Age: 25
End: 2020-04-08

## 2020-04-08 VITALS — HEART RATE: 72 BPM

## 2020-04-08 DIAGNOSIS — F90.2 ATTENTION DEFICIT HYPERACTIVITY DISORDER (ADHD), COMBINED TYPE: Primary | ICD-10-CM

## 2020-04-08 PROCEDURE — 99213 OFFICE O/P EST LOW 20 MIN: CPT | Mod: 95,,, | Performed by: NURSE PRACTITIONER

## 2020-04-08 PROCEDURE — 99213 PR OFFICE/OUTPT VISIT, EST, LEVL III, 20-29 MIN: ICD-10-PCS | Mod: 95,,, | Performed by: NURSE PRACTITIONER

## 2020-04-08 NOTE — TELEPHONE ENCOUNTER
Called and spoke with pt in regards of scheduling her 3 month follow up appt. Patient made an appt on 7/8/2020.

## 2020-04-08 NOTE — TELEPHONE ENCOUNTER
----- Message from Naila Smith NP sent at 4/8/2020 11:32 AM CDT -----  Call patient to set up an office visit in 3 months for ADHD follow up please.

## 2020-04-23 ENCOUNTER — PATIENT MESSAGE (OUTPATIENT)
Dept: FAMILY MEDICINE | Facility: CLINIC | Age: 25
End: 2020-04-23

## 2020-04-23 DIAGNOSIS — L20.9 ATOPIC DERMATITIS: ICD-10-CM

## 2020-04-23 DIAGNOSIS — F90.2 ATTENTION DEFICIT HYPERACTIVITY DISORDER (ADHD), COMBINED TYPE: ICD-10-CM

## 2020-04-23 RX ORDER — HYDROXYZINE HYDROCHLORIDE 25 MG/1
25 TABLET, FILM COATED ORAL DAILY
Qty: 60 TABLET | Refills: 0 | Status: SHIPPED | OUTPATIENT
Start: 2020-04-23 | End: 2022-10-31

## 2020-04-23 RX ORDER — LISDEXAMFETAMINE DIMESYLATE 60 MG/1
60 CAPSULE ORAL EVERY MORNING
Qty: 30 CAPSULE | Refills: 0 | Status: SHIPPED | OUTPATIENT
Start: 2020-04-23 | End: 2020-05-25 | Stop reason: SDUPTHER

## 2020-07-07 ENCOUNTER — TELEPHONE (OUTPATIENT)
Dept: FAMILY MEDICINE | Facility: CLINIC | Age: 25
End: 2020-07-07

## 2020-07-07 NOTE — TELEPHONE ENCOUNTER
Advise patient that her last ADHD visit was a virtual visit - every other visit must be in person to assess heart sounds, etc.  Must be an in-person visit this time.

## 2020-07-07 NOTE — TELEPHONE ENCOUNTER
----- Message from Sofy Mazariegos sent at 7/6/2020  5:54 PM CDT -----  Elisha would like to know if its possible to convert 7/8/2020 at 4 pm into a virtual visit since the appt is for med check. She has a lunch break between 12 and 1:30 and her rotations are done by 5pm. Please call [phone] to discuss.

## 2020-07-07 NOTE — TELEPHONE ENCOUNTER
Spoke with patient states she is in [place] for 2 weeks with school after that she not sure where she will be states she still 1 month supply left on her medication she not sure where she will be at after that patient will cancel appt for tomorrow.

## 2020-07-22 ENCOUNTER — OFFICE VISIT (OUTPATIENT)
Dept: FAMILY MEDICINE | Facility: CLINIC | Age: 25
End: 2020-07-22
Payer: COMMERCIAL

## 2020-07-22 VITALS
HEIGHT: 67 IN | SYSTOLIC BLOOD PRESSURE: 118 MMHG | HEART RATE: 98 BPM | WEIGHT: 135.81 LBS | RESPIRATION RATE: 18 BRPM | BODY MASS INDEX: 21.31 KG/M2 | DIASTOLIC BLOOD PRESSURE: 80 MMHG | OXYGEN SATURATION: 97 % | TEMPERATURE: 97 F

## 2020-07-22 DIAGNOSIS — F90.2 ATTENTION DEFICIT HYPERACTIVITY DISORDER (ADHD), COMBINED TYPE: Primary | ICD-10-CM

## 2020-07-22 PROCEDURE — 3008F BODY MASS INDEX DOCD: CPT | Mod: CPTII,S$GLB,, | Performed by: NURSE PRACTITIONER

## 2020-07-22 PROCEDURE — 99999 PR PBB SHADOW E&M-EST. PATIENT-LVL IV: CPT | Mod: PBBFAC,,, | Performed by: NURSE PRACTITIONER

## 2020-07-22 PROCEDURE — 99999 PR PBB SHADOW E&M-EST. PATIENT-LVL IV: ICD-10-PCS | Mod: PBBFAC,,, | Performed by: NURSE PRACTITIONER

## 2020-07-22 PROCEDURE — 99213 OFFICE O/P EST LOW 20 MIN: CPT | Mod: S$GLB,,, | Performed by: NURSE PRACTITIONER

## 2020-07-22 PROCEDURE — 3008F PR BODY MASS INDEX (BMI) DOCUMENTED: ICD-10-PCS | Mod: CPTII,S$GLB,, | Performed by: NURSE PRACTITIONER

## 2020-07-22 PROCEDURE — 99213 PR OFFICE/OUTPT VISIT, EST, LEVL III, 20-29 MIN: ICD-10-PCS | Mod: S$GLB,,, | Performed by: NURSE PRACTITIONER

## 2020-07-22 NOTE — PROGRESS NOTES
Subjective:       Patient ID: Elisha Grimes is a 24 y.o. female.    Chief Complaint: ADHD (F/U)    Patient is a 24 year old white female with ADHD, Acne Vulgaris and Eczema that is treated by Dermatology that is here today for ADHD follow up.     Patient has ADHD that is  controlled on Vyvanse at 60 mg daily.  Patient graduated [employer] with a 4 year degree in Biology and and is now going to school at [employer] in [place]. Reports medication is working well without side effects.     The Acne Vulgaris and Eczema are treated by Dermatologist, Dr. Maria Luz Martinez.      Current Outpatient Medications   Medication Sig Dispense Refill    ACZONE 7.5 % GlwP SPOT TREAT FACE EVERY MORNING  3    clobetasol 0.05% (TEMOVATE) 0.05 % Oint Apply to the affected area twice daily for 2 weeks out of month or 2 days out of the week.  Avoid face. 60 g 1    flurandrenolide (CORDRAN) 0.05 % lotion Apply topically.      hydrocortisone 2.5 % ointment Apply 1 application twice daily up to 2 weeks on face and neck. 28.35 g 0    hydroxyzine HCL (ATARAX) 25 MG tablet Take 1 to 2 tablets (25 mg total) by mouth nightly. 60 tablet 0    lisdexamfetamine (VYVANSE) 60 MG capsule Take 1 capsule (60 mg total) by mouth every morning. 30 capsule 0    naproxen (NAPROSYN) 375 MG tablet Take 375 mg by mouth.      norethindrone-e.estradiol-iron (LO LOESTRIN FE) 1 mg-10 mcg (24)/10 mcg (2) Tab Take 1 tablet by mouth once daily 84 tablet 3    spironolactone (ALDACTONE) 50 MG tablet Take 1 tablet (50 mg total) by mouth 2 (two) times daily. 60 tablet 11    tacrolimus (PROTOPIC) 0.1 % ointment 1 application 2 (two) times daily.  6    triamcinolone acetonide 0.1% (KENALOG) 0.1 % cream Apply to areas of eczema on body twice a day      valACYclovir (VALTREX) 1000 MG tablet Take 2 tablets (2,000 mg total) by mouth 2 (two) times daily. 4 tablet 0     No current facility-administered medications for this visit.        Past  Medical History:   Diagnosis Date    Acne vulgaris 08/06/2018    TREATED BY CANDI QUINTEROS    Attention deficit hyperactivity disorder (ADHD), combined type     Eczema        History reviewed. No pertinent surgical history.    Family History   Problem Relation Age of Onset    No Known Problems Mother     Heart disease Father         passed age 63 Acute MI; had prior cardiac arrest events prior -     Hypertension Father     Hyperlipidemia Father     No Known Problems Brother     Emphysema Maternal Grandmother        Social History     Socioeconomic History    Marital status: Single     Spouse name: Not on file    Number of children: Not on file    Years of education: Not on file    Highest education level: Not on file   Occupational History    Occupation: student   Social Needs    Financial resource strain: Not on file    Food insecurity     Worry: Never true     Inability: Never true    Transportation needs     Medical: No     Non-medical: No   Tobacco Use    Smoking status: Never Smoker    Smokeless tobacco: Never Used   Substance and Sexual Activity    Alcohol use: Yes     Frequency: Monthly or less     Drinks per session: 3 or 4     Binge frequency: Never     Comment: once monthly on average; vodka and water usually 2 drinks on a night she drinks    Drug use: No    Sexual activity: Yes     Partners: Male     Birth control/protection: OCP   Lifestyle    Physical activity     Days per week: 3 days     Minutes per session: 90 min    Stress: Not at all   Relationships    Social connections     Talks on phone: More than three times a week     Gets together: More than three times a week     Attends Alevism service: Not on file     Active member of club or organization: No     Attends meetings of clubs or organizations: Patient refused     Relationship status: Never    Other Topics Concern    Not on file   Social History Narrative    [employer] - major Biology.  Now in [place] school  "with [employer].       Review of Systems   Constitutional: Negative for appetite change, chills, fatigue, fever and unexpected weight change.   HENT: Negative for congestion, ear pain, mouth sores, nosebleeds, postnasal drip, rhinorrhea, sinus pressure, sneezing, sore throat, trouble swallowing and voice change.    Eyes: Negative for photophobia, pain, discharge, redness, itching and visual disturbance.   Respiratory: Negative for cough, chest tightness and shortness of breath.    Cardiovascular: Negative for chest pain, palpitations and leg swelling.   Gastrointestinal: Negative for abdominal pain, blood in stool, constipation, diarrhea, nausea and vomiting.   Genitourinary: Negative for dysuria, frequency, hematuria and urgency.   Musculoskeletal: Negative for arthralgias, back pain, joint swelling and myalgias.   Skin: Negative for color change and rash.   Allergic/Immunologic: Negative for immunocompromised state.   Neurological: Negative for dizziness, seizures, syncope, weakness and headaches.   Hematological: Negative for adenopathy. Does not bruise/bleed easily.   Psychiatric/Behavioral: Negative for agitation, dysphoric mood, sleep disturbance and suicidal ideas. The patient is not nervous/anxious.          Objective:     Vitals:    07/22/20 1547   BP: 118/80   BP Location: Right arm   Patient Position: Sitting   BP Method: Large (Manual)   Pulse: 98   Resp: 18   Temp: 97.3 °F (36.3 °C)   TempSrc: Oral   SpO2: 97%   Weight: 61.6 kg (135 lb 12.9 oz)   Height: 5' 7" (1.702 m)          Physical Exam  Constitutional:       General: She is not in acute distress.     Appearance: Normal appearance. She is well-developed and normal weight. She is not ill-appearing, toxic-appearing or diaphoretic.      Comments: Body mass index is 21.27 kg/m².       HENT:      Head: Normocephalic and atraumatic.      Right Ear: Tympanic membrane and external ear normal. There is no impacted " cerumen.      Left Ear: Tympanic membrane and external ear normal. There is no impacted cerumen.      Nose: Nose normal.   Eyes:      General: No scleral icterus.        Right eye: No discharge.         Left eye: No discharge.      Extraocular Movements: Extraocular movements intact.      Pupils: Pupils are equal, round, and reactive to light.   Neck:      Musculoskeletal: Normal range of motion and neck supple.      Thyroid: No thyromegaly.      Trachea: No tracheal deviation.   Cardiovascular:      Rate and Rhythm: Normal rate and regular rhythm.      Heart sounds: Normal heart sounds. No murmur.   Pulmonary:      Effort: Pulmonary effort is normal. No respiratory distress.      Breath sounds: Normal breath sounds.   Abdominal:      General: There is no distension.      Palpations: Abdomen is soft. There is no mass.      Tenderness: There is no abdominal tenderness. There is no guarding.      Hernia: No hernia is present.   Musculoskeletal: Normal range of motion.   Lymphadenopathy:      Cervical: No cervical adenopathy.   Skin:     General: Skin is warm and dry.   Neurological:      Mental Status: She is alert and oriented to person, place, and time.      Cranial Nerves: No cranial nerve deficit.      Coordination: Coordination normal.   Psychiatric:         Mood and Affect: Mood normal.         Behavior: Behavior normal.         Thought Content: Thought content normal.         Judgment: Judgment normal.           Assessment:         ICD-10-CM ICD-9-CM   1. Attention deficit hyperactivity disorder (ADHD), combined type  F90.2 314.01       Plan:       Attention deficit hyperactivity disorder (ADHD), combined type  -  patient to send refill request when needed.  Follow-up in 3 months.      Follow up in about 3 months (around 10/22/2020) for ADHD med check.     Patient's Medications   New Prescriptions    No medications on file   Previous Medications    ACZONE 7.5 % GLWP    SPOT TREAT FACE EVERY MORNING     CLOBETASOL 0.05% (TEMOVATE) 0.05 % OINT    Apply to the affected area twice daily for 2 weeks out of month or 2 days out of the week.  Avoid face.    FLURANDRENOLIDE (CORDRAN) 0.05 % LOTION    Apply topically.    HYDROCORTISONE 2.5 % OINTMENT    Apply 1 application twice daily up to 2 weeks on face and neck.    HYDROXYZINE HCL (ATARAX) 25 MG TABLET    Take 1 to 2 tablets (25 mg total) by mouth nightly.    LISDEXAMFETAMINE (VYVANSE) 60 MG CAPSULE    Take 1 capsule (60 mg total) by mouth every morning.    NAPROXEN (NAPROSYN) 375 MG TABLET    Take 375 mg by mouth.    NORETHINDRONE-E.ESTRADIOL-IRON (LO LOESTRIN FE) 1 MG-10 MCG (24)/10 MCG (2) TAB    Take 1 tablet by mouth once daily    SPIRONOLACTONE (ALDACTONE) 50 MG TABLET    Take 1 tablet (50 mg total) by mouth 2 (two) times daily.    TACROLIMUS (PROTOPIC) 0.1 % OINTMENT    1 application 2 (two) times daily.    TRIAMCINOLONE ACETONIDE 0.1% (KENALOG) 0.1 % CREAM    Apply to areas of eczema on body twice a day    VALACYCLOVIR (VALTREX) 1000 MG TABLET    Take 2 tablets (2,000 mg total) by mouth 2 (two) times daily.   Modified Medications    No medications on file   Discontinued Medications    PREDNISONE (DELTASONE) 10 MG TABLET    Take 1 pill daily for 4 days, then 1/2 pill daily for 4 days then stop.

## 2020-09-12 DIAGNOSIS — F90.2 ATTENTION DEFICIT HYPERACTIVITY DISORDER (ADHD), COMBINED TYPE: ICD-10-CM

## 2020-09-14 DIAGNOSIS — F90.2 ATTENTION DEFICIT HYPERACTIVITY DISORDER (ADHD), COMBINED TYPE: ICD-10-CM

## 2020-09-14 RX ORDER — LISDEXAMFETAMINE DIMESYLATE 60 MG/1
CAPSULE ORAL
Qty: 30 CAPSULE | Refills: 0 | OUTPATIENT
Start: 2020-09-14

## 2020-09-14 RX ORDER — LISDEXAMFETAMINE DIMESYLATE 60 MG/1
60 CAPSULE ORAL EVERY MORNING
Qty: 30 CAPSULE | Refills: 0 | Status: SHIPPED | OUTPATIENT
Start: 2020-09-14 | End: 2020-10-08 | Stop reason: SDUPTHER

## 2020-10-08 DIAGNOSIS — F90.2 ATTENTION DEFICIT HYPERACTIVITY DISORDER (ADHD), COMBINED TYPE: ICD-10-CM

## 2020-10-12 RX ORDER — LISDEXAMFETAMINE DIMESYLATE 60 MG/1
60 CAPSULE ORAL EVERY MORNING
Qty: 30 CAPSULE | Refills: 0 | Status: SHIPPED | OUTPATIENT
Start: 2020-10-12 | End: 2020-11-08 | Stop reason: SDUPTHER

## 2020-10-21 ENCOUNTER — PATIENT MESSAGE (OUTPATIENT)
Dept: PHARMACY | Facility: CLINIC | Age: 25
End: 2020-10-21

## 2020-10-30 ENCOUNTER — OFFICE VISIT (OUTPATIENT)
Dept: FAMILY MEDICINE | Facility: CLINIC | Age: 25
End: 2020-10-30
Payer: COMMERCIAL

## 2020-10-30 ENCOUNTER — PATIENT MESSAGE (OUTPATIENT)
Dept: FAMILY MEDICINE | Facility: CLINIC | Age: 25
End: 2020-10-30

## 2020-10-30 VITALS
HEART RATE: 92 BPM | SYSTOLIC BLOOD PRESSURE: 132 MMHG | BODY MASS INDEX: 20.83 KG/M2 | WEIGHT: 133 LBS | DIASTOLIC BLOOD PRESSURE: 88 MMHG

## 2020-10-30 DIAGNOSIS — Z11.4 SCREENING FOR HIV WITHOUT PRESENCE OF RISK FACTORS: ICD-10-CM

## 2020-10-30 DIAGNOSIS — F90.2 ATTENTION DEFICIT HYPERACTIVITY DISORDER (ADHD), COMBINED TYPE: Primary | ICD-10-CM

## 2020-10-30 DIAGNOSIS — Z11.59 ENCOUNTER FOR HEPATITIS C SCREENING TEST FOR LOW RISK PATIENT: ICD-10-CM

## 2020-10-30 DIAGNOSIS — Z13.220 SCREENING CHOLESTEROL LEVEL: ICD-10-CM

## 2020-10-30 DIAGNOSIS — Z13.1 DIABETES MELLITUS SCREENING: ICD-10-CM

## 2020-10-30 DIAGNOSIS — Z13.0 SCREENING FOR DEFICIENCY ANEMIA: ICD-10-CM

## 2020-10-30 DIAGNOSIS — L70.0 ACNE VULGARIS: ICD-10-CM

## 2020-10-30 DIAGNOSIS — L30.9 ECZEMA, UNSPECIFIED TYPE: ICD-10-CM

## 2020-10-30 DIAGNOSIS — Z13.29 THYROID DISORDER SCREEN: ICD-10-CM

## 2020-10-30 PROCEDURE — 99213 OFFICE O/P EST LOW 20 MIN: CPT | Mod: 95,,, | Performed by: NURSE PRACTITIONER

## 2020-10-30 PROCEDURE — 99213 PR OFFICE/OUTPT VISIT, EST, LEVL III, 20-29 MIN: ICD-10-PCS | Mod: 95,,, | Performed by: NURSE PRACTITIONER

## 2020-10-30 PROCEDURE — 3008F PR BODY MASS INDEX (BMI) DOCUMENTED: ICD-10-PCS | Mod: CPTII,,, | Performed by: NURSE PRACTITIONER

## 2020-10-30 PROCEDURE — 3008F BODY MASS INDEX DOCD: CPT | Mod: CPTII,,, | Performed by: NURSE PRACTITIONER

## 2020-10-30 RX ORDER — CYCLOBENZAPRINE HCL 5 MG
TABLET ORAL
COMMUNITY
Start: 2020-09-30 | End: 2021-04-27 | Stop reason: SDUPTHER

## 2020-10-30 NOTE — PROGRESS NOTES
Subjective:       Patient ID: Elisha Grimes is a 25 y.o. female.    Chief Complaint: ADHD    Patient is a 25 year old white female with ADHD, Acne Vulgaris and Eczema that is treated by Dermatology that has virtual visit today for ADHD follow up.     Patient has ADHD that is  controlled on Vyvanse at 60 mg daily.  Patient graduated [employer] with a 4 year degree in Biology and and is now going to school at [employer] in [place]. Reports medication is working well without side effects.     The Acne Vulgaris and Eczema are treated by Dermatologist, Dr. Maria Luz Martinez.      The patient location is: [place]  The chief complaint leading to consultation is: ADHD follow up    Visit type: audiovisual    Face to Face time with patient: 15  15 minutes of total time spent on the encounter, which includes face to face time and non-face to face time preparing to see the patient (eg, review of tests), Obtaining and/or reviewing separately obtained history, Documenting clinical information in the electronic or other health record, Independently interpreting results (not separately reported) and communicating results to the patient/family/caregiver, or Care coordination (not separately reported).         Each patient to whom he or she provides medical services by telemedicine is:  (1) informed of the relationship between the physician and patient and the respective role of any other health care provider with respect to management of the patient; and (2) notified that he or she may decline to receive medical services by telemedicine and may withdraw from such care at any time.    Notes:   Current Outpatient Medications   Medication Sig Dispense Refill    ACZONE 7.5 % GlwP SPOT TREAT FACE EVERY MORNING  3    clobetasol 0.05% (TEMOVATE) 0.05 % Oint Apply to the affected area twice daily for 2 weeks out of month or 2 days out of the week.  Avoid face. 60 g 1    cyclobenzaprine (FLEXERIL) 5 MG tablet        flurandrenolide (CORDRAN) 0.05 % lotion Apply topically.      hydrocortisone 2.5 % ointment Apply 1 application twice daily up to 2 weeks on face and neck. 28.35 g 0    lisdexamfetamine (VYVANSE) 60 MG capsule Take 1 capsule (60 mg total) by mouth every morning. 30 capsule 0    naproxen (NAPROSYN) 375 MG tablet Take 375 mg by mouth.      norethindrone-e.estradiol-iron (LO LOESTRIN FE) 1 mg-10 mcg (24)/10 mcg (2) Tab Take 1 tablet by mouth once daily 84 tablet 3    spironolactone (ALDACTONE) 50 MG tablet Take 1 tablet (50 mg total) by mouth 2 (two) times daily. 60 tablet 11    tacrolimus (PROTOPIC) 0.1 % ointment 1 application 2 (two) times daily.  6    triamcinolone acetonide 0.1% (KENALOG) 0.1 % cream Apply to areas of eczema on body twice a day      valACYclovir (VALTREX) 1000 MG tablet Take 2 tablets (2,000 mg total) by mouth 2 (two) times daily. 4 tablet 0    hydroxyzine HCL (ATARAX) 25 MG tablet Take 1 to 2 tablets (25 mg total) by mouth nightly. 60 tablet 0     No current facility-administered medications for this visit.        Past Medical History:   Diagnosis Date    Acne vulgaris 08/06/2018    TREATED BY CANDI QUINTEROS    Attention deficit hyperactivity disorder (ADHD), combined type     Eczema        History reviewed. No pertinent surgical history.    Family History   Problem Relation Age of Onset    No Known Problems Mother     Heart disease Father         passed age 63 Acute MI; had prior cardiac arrest events prior -     Hypertension Father     Hyperlipidemia Father     No Known Problems Brother     Emphysema Maternal Grandmother        Social History     Socioeconomic History    Marital status: Single     Spouse name: Not on file    Number of children: Not on file    Years of education: Not on file    Highest education level: Not on file   Occupational History    Occupation: student   Social Needs    Financial resource strain: Not on file    Food insecurity     Worry: Never  true     Inability: Never true    Transportation needs     Medical: No     Non-medical: No   Tobacco Use    Smoking status: Never Smoker    Smokeless tobacco: Never Used   Substance and Sexual Activity    Alcohol use: Yes     Frequency: Monthly or less     Drinks per session: 3 or 4     Binge frequency: Never     Comment: once monthly on average; vodka and water usually 2 drinks on a night she drinks    Drug use: No    Sexual activity: Yes     Partners: Male     Birth control/protection: OCP   Lifestyle    Physical activity     Days per week: 3 days     Minutes per session: 90 min    Stress: Not at all   Relationships    Social connections     Talks on phone: More than three times a week     Gets together: More than three times a week     Attends Adventism service: Not on file     Active member of club or organization: No     Attends meetings of clubs or organizations: Patient refused     Relationship status: Never    Other Topics Concern    Not on file   Social History Narrative    [employer] - major Biology.  Now in [place] school with [employer].       Review of Systems   Constitutional: Negative for activity change and unexpected weight change.   HENT: Negative for hearing loss, rhinorrhea and trouble swallowing.    Eyes: Negative for discharge and visual disturbance.   Respiratory: Negative for chest tightness and wheezing.    Cardiovascular: Negative for chest pain and palpitations.   Gastrointestinal: Negative for blood in stool, constipation, diarrhea and vomiting.   Endocrine: Negative for polydipsia.   Genitourinary: Negative for difficulty urinating and hematuria.   Musculoskeletal: Negative for neck pain.   Neurological: Negative for headaches.   Psychiatric/Behavioral: Negative for dysphoric mood.   All other systems reviewed and are negative.        Objective:     Vitals:    10/30/20 1516   BP: 132/88   Pulse: 92   Weight: 60.3 kg (133 lb)           Physical Exam  Constitutional:       General: She is not in acute distress.     Appearance: Normal appearance. She is well-developed and normal weight. She is not ill-appearing, toxic-appearing or diaphoretic.      Comments: Body mass index is 20.83 kg/m².     HENT:      Head: Normocephalic and atraumatic.      Right Ear: Tympanic membrane and external ear normal. There is no impacted cerumen.      Left Ear: Tympanic membrane and external ear normal. There is no impacted cerumen.      Nose: Nose normal.   Eyes:      General: No scleral icterus.        Right eye: No discharge.         Left eye: No discharge.      Extraocular Movements: Extraocular movements intact.      Pupils: Pupils are equal, round, and reactive to light.   Neck:      Musculoskeletal: Normal range of motion and neck supple.      Thyroid: No thyromegaly.      Trachea: No tracheal deviation.   Cardiovascular:      Rate and Rhythm: Normal rate and regular rhythm.      Heart sounds: Normal heart sounds. No murmur.   Pulmonary:      Effort: Pulmonary effort is normal. No respiratory distress.      Breath sounds: Normal breath sounds.   Abdominal:      General: There is no distension.      Palpations: Abdomen is soft. There is no mass.      Tenderness: There is no abdominal tenderness. There is no guarding.      Hernia: No hernia is present.   Musculoskeletal: Normal range of motion.   Lymphadenopathy:      Cervical: No cervical adenopathy.   Skin:     General: Skin is warm and dry.   Neurological:      Mental Status: She is alert and oriented to person, place, and time.      Cranial Nerves: No cranial nerve deficit.      Coordination: Coordination normal.   Psychiatric:         Mood and Affect: Mood normal.         Behavior: Behavior normal.         Thought Content: Thought content normal.         Judgment: Judgment normal.           Assessment:         ICD-10-CM ICD-9-CM   1. Attention deficit hyperactivity disorder (ADHD), combined type   F90.2 314.01   2. Acne vulgaris  L70.0 706.1   3. Eczema, unspecified type  L30.9 692.9   4. Screening for deficiency anemia  Z13.0 V78.1   5. Thyroid disorder screen  Z13.29 V77.0   6. Screening cholesterol level  Z13.220 V77.91   7. Diabetes mellitus screening  Z13.1 V77.1   8. Encounter for hepatitis C screening test for low risk patient  Z11.59 V73.89   9. Screening for HIV without presence of risk factors  Z11.4 V73.89       Plan:       Attention deficit hyperactivity disorder (ADHD), combined type  -  Patient will send refill request when needed - follow up in 3 months.    Acne vulgaris    Eczema, unspecified type    Screening for deficiency anemia  -     CBC Auto Differential; Future; Expected date: 10/30/2020    Thyroid disorder screen  -     TSH; Future; Expected date: 10/30/2020    Screening cholesterol level  -     Lipid Panel; Future; Expected date: 10/30/2020    Diabetes mellitus screening  -     Comprehensive Metabolic Panel; Future; Expected date: 10/30/2020    Encounter for hepatitis C screening test for low risk patient  -     Hepatitis C Antibody; Future; Expected date: 10/30/2020    Screening for HIV without presence of risk factors  -     HIV 1/2 Ag/Ab (4th Gen); Future; Expected date: 10/30/2020      Follow up in about 3 months (around 1/30/2021) for fasting labs and WELLNESS EXAM.     Patient's Medications   New Prescriptions    No medications on file   Previous Medications    ACZONE 7.5 % GLWP    SPOT TREAT FACE EVERY MORNING    CLOBETASOL 0.05% (TEMOVATE) 0.05 % OINT    Apply to the affected area twice daily for 2 weeks out of month or 2 days out of the week.  Avoid face.    CYCLOBENZAPRINE (FLEXERIL) 5 MG TABLET        FLURANDRENOLIDE (CORDRAN) 0.05 % LOTION    Apply topically.    HYDROCORTISONE 2.5 % OINTMENT    Apply 1 application twice daily up to 2 weeks on face and neck.    HYDROXYZINE HCL (ATARAX) 25 MG TABLET    Take 1 to 2 tablets (25 mg total) by mouth nightly.    LISDEXAMFETAMINE  (VYVANSE) 60 MG CAPSULE    Take 1 capsule (60 mg total) by mouth every morning.    NAPROXEN (NAPROSYN) 375 MG TABLET    Take 375 mg by mouth.    NORETHINDRONE-E.ESTRADIOL-IRON (LO LOESTRIN FE) 1 MG-10 MCG (24)/10 MCG (2) TAB    Take 1 tablet by mouth once daily    SPIRONOLACTONE (ALDACTONE) 50 MG TABLET    Take 1 tablet (50 mg total) by mouth 2 (two) times daily.    TACROLIMUS (PROTOPIC) 0.1 % OINTMENT    1 application 2 (two) times daily.    TRIAMCINOLONE ACETONIDE 0.1% (KENALOG) 0.1 % CREAM    Apply to areas of eczema on body twice a day    VALACYCLOVIR (VALTREX) 1000 MG TABLET    Take 2 tablets (2,000 mg total) by mouth 2 (two) times daily.   Modified Medications    No medications on file   Discontinued Medications    No medications on file

## 2020-11-08 DIAGNOSIS — F90.2 ATTENTION DEFICIT HYPERACTIVITY DISORDER (ADHD), COMBINED TYPE: ICD-10-CM

## 2020-11-09 RX ORDER — LISDEXAMFETAMINE DIMESYLATE 60 MG/1
60 CAPSULE ORAL EVERY MORNING
Qty: 30 CAPSULE | Refills: 0 | Status: SHIPPED | OUTPATIENT
Start: 2020-11-09 | End: 2020-12-18 | Stop reason: SDUPTHER

## 2020-12-18 DIAGNOSIS — F90.2 ATTENTION DEFICIT HYPERACTIVITY DISORDER (ADHD), COMBINED TYPE: ICD-10-CM

## 2020-12-18 DIAGNOSIS — L70.0 ACNE VULGARIS: ICD-10-CM

## 2020-12-18 RX ORDER — SPIRONOLACTONE 50 MG/1
50 TABLET, FILM COATED ORAL 2 TIMES DAILY
Qty: 60 TABLET | Refills: 11 | Status: SHIPPED | OUTPATIENT
Start: 2020-12-18 | End: 2021-03-01 | Stop reason: SDUPTHER

## 2020-12-18 RX ORDER — LISDEXAMFETAMINE DIMESYLATE 60 MG/1
60 CAPSULE ORAL EVERY MORNING
Qty: 30 CAPSULE | Refills: 0 | Status: SHIPPED | OUTPATIENT
Start: 2020-12-18 | End: 2021-01-12 | Stop reason: SDUPTHER

## 2020-12-26 ENCOUNTER — CLINICAL SUPPORT (OUTPATIENT)
Dept: URGENT CARE | Facility: CLINIC | Age: 25
End: 2020-12-26
Payer: COMMERCIAL

## 2020-12-26 VITALS — TEMPERATURE: 98 F | OXYGEN SATURATION: 96 % | HEART RATE: 95 BPM

## 2020-12-26 DIAGNOSIS — U07.1 COVID-19: Primary | ICD-10-CM

## 2020-12-26 LAB
CTP QC/QA: YES
SARS-COV-2 RDRP RESP QL NAA+PROBE: NEGATIVE

## 2020-12-26 PROCEDURE — U0002 COVID-19 LAB TEST NON-CDC: HCPCS | Mod: QW,S$GLB,, | Performed by: PHYSICIAN ASSISTANT

## 2020-12-26 PROCEDURE — U0002: ICD-10-PCS | Mod: QW,S$GLB,, | Performed by: PHYSICIAN ASSISTANT

## 2020-12-26 NOTE — PROGRESS NOTES
Patient presents to the clinic with COVID concerns, patient was given the option to see a provider.  Patient is symptomatic and elects to not see a provider, they were given a handout which recognizes their decision to not see the provider today, as well as recommendations to follow up with their PCP.  If their symptoms worsen, they will need to follow up with their PCP, any urgent care clinic, or ER for further evaluation.     Patient's temperature, O2 sats, and pulse were taken for this visit.   Vitals:    12/26/20 1226   Pulse: 95   Temp: 97.9 °F (36.6 °C)         They were within normal limits.   If not with in normal, they were advised that they should see the provider for evaluation.  However, they adamantly refused despite provider's encouragement.

## 2021-01-12 ENCOUNTER — OFFICE VISIT (OUTPATIENT)
Dept: FAMILY MEDICINE | Facility: CLINIC | Age: 26
End: 2021-01-12
Payer: COMMERCIAL

## 2021-01-12 VITALS
DIASTOLIC BLOOD PRESSURE: 83 MMHG | BODY MASS INDEX: 21.3 KG/M2 | SYSTOLIC BLOOD PRESSURE: 126 MMHG | HEART RATE: 82 BPM | WEIGHT: 136 LBS

## 2021-01-12 DIAGNOSIS — F90.2 ATTENTION DEFICIT HYPERACTIVITY DISORDER (ADHD), COMBINED TYPE: Primary | ICD-10-CM

## 2021-01-12 PROCEDURE — 3008F PR BODY MASS INDEX (BMI) DOCUMENTED: ICD-10-PCS | Mod: CPTII,,, | Performed by: NURSE PRACTITIONER

## 2021-01-12 PROCEDURE — 99213 PR OFFICE/OUTPT VISIT, EST, LEVL III, 20-29 MIN: ICD-10-PCS | Mod: 95,,, | Performed by: NURSE PRACTITIONER

## 2021-01-12 PROCEDURE — 3008F BODY MASS INDEX DOCD: CPT | Mod: CPTII,,, | Performed by: NURSE PRACTITIONER

## 2021-01-12 PROCEDURE — 99213 OFFICE O/P EST LOW 20 MIN: CPT | Mod: 95,,, | Performed by: NURSE PRACTITIONER

## 2021-01-12 RX ORDER — LISDEXAMFETAMINE DIMESYLATE 60 MG/1
60 CAPSULE ORAL EVERY MORNING
Qty: 30 CAPSULE | Refills: 0 | Status: SHIPPED | OUTPATIENT
Start: 2021-01-16 | End: 2021-02-19 | Stop reason: SDUPTHER

## 2021-01-16 DIAGNOSIS — F90.2 ATTENTION DEFICIT HYPERACTIVITY DISORDER (ADHD), COMBINED TYPE: ICD-10-CM

## 2021-01-19 RX ORDER — LISDEXAMFETAMINE DIMESYLATE 60 MG/1
60 CAPSULE ORAL EVERY MORNING
Qty: 30 CAPSULE | Refills: 0 | OUTPATIENT
Start: 2021-01-19

## 2021-03-25 DIAGNOSIS — F90.2 ATTENTION DEFICIT HYPERACTIVITY DISORDER (ADHD), COMBINED TYPE: ICD-10-CM

## 2021-03-25 RX ORDER — LISDEXAMFETAMINE DIMESYLATE 60 MG/1
60 CAPSULE ORAL EVERY MORNING
Qty: 30 CAPSULE | Refills: 0 | Status: SHIPPED | OUTPATIENT
Start: 2021-03-25 | End: 2021-04-22 | Stop reason: DRUGHIGH

## 2021-04-19 ENCOUNTER — TELEPHONE (OUTPATIENT)
Dept: FAMILY MEDICINE | Facility: CLINIC | Age: 26
End: 2021-04-19

## 2021-04-22 ENCOUNTER — OFFICE VISIT (OUTPATIENT)
Dept: FAMILY MEDICINE | Facility: CLINIC | Age: 26
End: 2021-04-22
Payer: COMMERCIAL

## 2021-04-22 VITALS
TEMPERATURE: 98 F | BODY MASS INDEX: 21.75 KG/M2 | HEIGHT: 67 IN | HEART RATE: 91 BPM | WEIGHT: 138.56 LBS | OXYGEN SATURATION: 98 % | SYSTOLIC BLOOD PRESSURE: 118 MMHG | DIASTOLIC BLOOD PRESSURE: 82 MMHG

## 2021-04-22 DIAGNOSIS — K21.9 CHRONIC GERD: ICD-10-CM

## 2021-04-22 DIAGNOSIS — L70.0 ACNE VULGARIS: ICD-10-CM

## 2021-04-22 DIAGNOSIS — F90.2 ATTENTION DEFICIT HYPERACTIVITY DISORDER (ADHD), COMBINED TYPE: ICD-10-CM

## 2021-04-22 DIAGNOSIS — Z00.00 ANNUAL PHYSICAL EXAM: Primary | ICD-10-CM

## 2021-04-22 PROCEDURE — 1126F PR PAIN SEVERITY QUANTIFIED, NO PAIN PRESENT: ICD-10-PCS | Mod: S$GLB,,, | Performed by: NURSE PRACTITIONER

## 2021-04-22 PROCEDURE — 99999 PR PBB SHADOW E&M-EST. PATIENT-LVL IV: ICD-10-PCS | Mod: PBBFAC,,, | Performed by: NURSE PRACTITIONER

## 2021-04-22 PROCEDURE — 99395 PR PREVENTIVE VISIT,EST,18-39: ICD-10-PCS | Mod: S$GLB,,, | Performed by: NURSE PRACTITIONER

## 2021-04-22 PROCEDURE — 99999 PR PBB SHADOW E&M-EST. PATIENT-LVL IV: CPT | Mod: PBBFAC,,, | Performed by: NURSE PRACTITIONER

## 2021-04-22 PROCEDURE — 3008F PR BODY MASS INDEX (BMI) DOCUMENTED: ICD-10-PCS | Mod: CPTII,S$GLB,, | Performed by: NURSE PRACTITIONER

## 2021-04-22 PROCEDURE — 3008F BODY MASS INDEX DOCD: CPT | Mod: CPTII,S$GLB,, | Performed by: NURSE PRACTITIONER

## 2021-04-22 PROCEDURE — 99395 PREV VISIT EST AGE 18-39: CPT | Mod: S$GLB,,, | Performed by: NURSE PRACTITIONER

## 2021-04-22 PROCEDURE — 1126F AMNT PAIN NOTED NONE PRSNT: CPT | Mod: S$GLB,,, | Performed by: NURSE PRACTITIONER

## 2021-04-22 RX ORDER — PANTOPRAZOLE SODIUM 40 MG/1
40 TABLET, DELAYED RELEASE ORAL DAILY
Qty: 30 TABLET | Refills: 1 | Status: ON HOLD | OUTPATIENT
Start: 2021-04-22 | End: 2021-05-24 | Stop reason: SDUPTHER

## 2021-04-22 RX ORDER — LISDEXAMFETAMINE DIMESYLATE 50 MG/1
50 CAPSULE ORAL EVERY MORNING
Qty: 30 CAPSULE | Refills: 0 | Status: SHIPPED | OUTPATIENT
Start: 2021-04-22 | End: 2021-05-27 | Stop reason: SDUPTHER

## 2021-04-26 ENCOUNTER — PATIENT MESSAGE (OUTPATIENT)
Dept: FAMILY MEDICINE | Facility: CLINIC | Age: 26
End: 2021-04-26

## 2021-04-27 ENCOUNTER — PATIENT MESSAGE (OUTPATIENT)
Dept: FAMILY MEDICINE | Facility: CLINIC | Age: 26
End: 2021-04-27

## 2021-04-27 RX ORDER — CYCLOBENZAPRINE HCL 5 MG
5 TABLET ORAL 3 TIMES DAILY PRN
Qty: 90 TABLET | Refills: 0 | Status: SHIPPED | OUTPATIENT
Start: 2021-04-27 | End: 2022-10-31

## 2021-05-06 ENCOUNTER — OFFICE VISIT (OUTPATIENT)
Dept: GASTROENTEROLOGY | Facility: CLINIC | Age: 26
End: 2021-05-06
Payer: COMMERCIAL

## 2021-05-06 VITALS
BODY MASS INDEX: 22.34 KG/M2 | SYSTOLIC BLOOD PRESSURE: 120 MMHG | WEIGHT: 142.31 LBS | OXYGEN SATURATION: 100 % | RESPIRATION RATE: 16 BRPM | HEIGHT: 67 IN | DIASTOLIC BLOOD PRESSURE: 66 MMHG | HEART RATE: 106 BPM

## 2021-05-06 DIAGNOSIS — R10.84 GENERALIZED ABDOMINAL PAIN: ICD-10-CM

## 2021-05-06 DIAGNOSIS — K21.9 GASTROESOPHAGEAL REFLUX DISEASE, UNSPECIFIED WHETHER ESOPHAGITIS PRESENT: Primary | ICD-10-CM

## 2021-05-06 DIAGNOSIS — Z01.818 PREOPERATIVE EXAMINATION: ICD-10-CM

## 2021-05-06 PROCEDURE — 3008F BODY MASS INDEX DOCD: CPT | Mod: CPTII,S$GLB,, | Performed by: NURSE PRACTITIONER

## 2021-05-06 PROCEDURE — 3008F PR BODY MASS INDEX (BMI) DOCUMENTED: ICD-10-PCS | Mod: CPTII,S$GLB,, | Performed by: NURSE PRACTITIONER

## 2021-05-06 PROCEDURE — 1126F PR PAIN SEVERITY QUANTIFIED, NO PAIN PRESENT: ICD-10-PCS | Mod: S$GLB,,, | Performed by: NURSE PRACTITIONER

## 2021-05-06 PROCEDURE — 99999 PR PBB SHADOW E&M-EST. PATIENT-LVL V: ICD-10-PCS | Mod: PBBFAC,,, | Performed by: NURSE PRACTITIONER

## 2021-05-06 PROCEDURE — 1126F AMNT PAIN NOTED NONE PRSNT: CPT | Mod: S$GLB,,, | Performed by: NURSE PRACTITIONER

## 2021-05-06 PROCEDURE — 99214 PR OFFICE/OUTPT VISIT, EST, LEVL IV, 30-39 MIN: ICD-10-PCS | Mod: S$GLB,,, | Performed by: NURSE PRACTITIONER

## 2021-05-06 PROCEDURE — 99999 PR PBB SHADOW E&M-EST. PATIENT-LVL V: CPT | Mod: PBBFAC,,, | Performed by: NURSE PRACTITIONER

## 2021-05-06 PROCEDURE — 99214 OFFICE O/P EST MOD 30 MIN: CPT | Mod: S$GLB,,, | Performed by: NURSE PRACTITIONER

## 2021-05-06 RX ORDER — DICYCLOMINE HYDROCHLORIDE 10 MG/1
10 CAPSULE ORAL 3 TIMES DAILY PRN
Qty: 60 CAPSULE | Refills: 2 | Status: SHIPPED | OUTPATIENT
Start: 2021-05-06

## 2021-05-25 PROBLEM — K21.9 GERD (GASTROESOPHAGEAL REFLUX DISEASE): Status: ACTIVE | Noted: 2021-05-25

## 2021-05-27 DIAGNOSIS — F90.2 ATTENTION DEFICIT HYPERACTIVITY DISORDER (ADHD), COMBINED TYPE: ICD-10-CM

## 2021-05-27 RX ORDER — LISDEXAMFETAMINE DIMESYLATE 50 MG/1
50 CAPSULE ORAL EVERY MORNING
Qty: 30 CAPSULE | Refills: 0 | Status: SHIPPED | OUTPATIENT
Start: 2021-05-27 | End: 2021-06-21 | Stop reason: SDUPTHER

## 2021-06-03 DIAGNOSIS — L70.0 ACNE VULGARIS: ICD-10-CM

## 2021-06-03 RX ORDER — SPIRONOLACTONE 50 MG/1
50 TABLET, FILM COATED ORAL 2 TIMES DAILY
Qty: 60 TABLET | Refills: 2 | Status: SHIPPED | OUTPATIENT
Start: 2021-06-03 | End: 2023-05-11 | Stop reason: SDUPTHER

## 2021-06-21 DIAGNOSIS — R10.84 GENERALIZED ABDOMINAL PAIN: ICD-10-CM

## 2021-06-21 DIAGNOSIS — K21.9 CHRONIC GERD: ICD-10-CM

## 2021-06-21 DIAGNOSIS — F90.2 ATTENTION DEFICIT HYPERACTIVITY DISORDER (ADHD), COMBINED TYPE: ICD-10-CM

## 2021-06-21 RX ORDER — LISDEXAMFETAMINE DIMESYLATE 50 MG/1
50 CAPSULE ORAL EVERY MORNING
Qty: 30 CAPSULE | Refills: 0 | Status: SHIPPED | OUTPATIENT
Start: 2021-06-25 | End: 2022-10-31

## 2021-06-21 RX ORDER — DICYCLOMINE HYDROCHLORIDE 10 MG/1
10 CAPSULE ORAL 3 TIMES DAILY PRN
Qty: 60 CAPSULE | Refills: 2 | OUTPATIENT
Start: 2021-06-21

## 2021-06-21 RX ORDER — PANTOPRAZOLE SODIUM 40 MG/1
40 TABLET, DELAYED RELEASE ORAL DAILY
Qty: 30 TABLET | Refills: 5 | OUTPATIENT
Start: 2021-06-21 | End: 2022-06-21

## 2022-05-24 ENCOUNTER — PATIENT MESSAGE (OUTPATIENT)
Dept: FAMILY MEDICINE | Facility: CLINIC | Age: 27
End: 2022-05-24
Payer: COMMERCIAL

## 2022-05-24 RX ORDER — VALACYCLOVIR HYDROCHLORIDE 1 G/1
2000 TABLET, FILM COATED ORAL 2 TIMES DAILY
Qty: 4 TABLET | Refills: 2 | Status: SHIPPED | OUTPATIENT
Start: 2022-05-24 | End: 2023-12-04 | Stop reason: SDUPTHER

## 2022-05-31 ENCOUNTER — PATIENT MESSAGE (OUTPATIENT)
Dept: ADMINISTRATIVE | Facility: HOSPITAL | Age: 27
End: 2022-05-31
Payer: COMMERCIAL

## 2022-10-24 ENCOUNTER — PATIENT MESSAGE (OUTPATIENT)
Dept: FAMILY MEDICINE | Facility: CLINIC | Age: 27
End: 2022-10-24
Payer: COMMERCIAL

## 2022-10-31 ENCOUNTER — OFFICE VISIT (OUTPATIENT)
Dept: FAMILY MEDICINE | Facility: CLINIC | Age: 27
End: 2022-10-31
Payer: COMMERCIAL

## 2022-10-31 VITALS
TEMPERATURE: 99 F | BODY MASS INDEX: 26.06 KG/M2 | SYSTOLIC BLOOD PRESSURE: 116 MMHG | OXYGEN SATURATION: 99 % | WEIGHT: 166 LBS | HEART RATE: 74 BPM | DIASTOLIC BLOOD PRESSURE: 72 MMHG | HEIGHT: 67 IN

## 2022-10-31 DIAGNOSIS — Z00.00 ENCOUNTER FOR BLOOD TEST FOR ROUTINE GENERAL PHYSICAL EXAMINATION: ICD-10-CM

## 2022-10-31 DIAGNOSIS — F41.8 SITUATIONAL ANXIETY: ICD-10-CM

## 2022-10-31 DIAGNOSIS — Z13.220 SCREENING CHOLESTEROL LEVEL: ICD-10-CM

## 2022-10-31 DIAGNOSIS — Z13.1 DIABETES MELLITUS SCREENING: ICD-10-CM

## 2022-10-31 DIAGNOSIS — Z13.0 SCREENING FOR DEFICIENCY ANEMIA: ICD-10-CM

## 2022-10-31 DIAGNOSIS — R42 DIZZINESS ON STANDING: Primary | ICD-10-CM

## 2022-10-31 DIAGNOSIS — Z13.29 THYROID DISORDER SCREEN: ICD-10-CM

## 2022-10-31 DIAGNOSIS — F90.2 ATTENTION DEFICIT HYPERACTIVITY DISORDER (ADHD), COMBINED TYPE: ICD-10-CM

## 2022-10-31 PROCEDURE — 1160F PR REVIEW ALL MEDS BY PRESCRIBER/CLIN PHARMACIST DOCUMENTED: ICD-10-PCS | Mod: CPTII,S$GLB,, | Performed by: NURSE PRACTITIONER

## 2022-10-31 PROCEDURE — 3078F PR MOST RECENT DIASTOLIC BLOOD PRESSURE < 80 MM HG: ICD-10-PCS | Mod: CPTII,S$GLB,, | Performed by: NURSE PRACTITIONER

## 2022-10-31 PROCEDURE — 1159F MED LIST DOCD IN RCRD: CPT | Mod: CPTII,S$GLB,, | Performed by: NURSE PRACTITIONER

## 2022-10-31 PROCEDURE — 1159F PR MEDICATION LIST DOCUMENTED IN MEDICAL RECORD: ICD-10-PCS | Mod: CPTII,S$GLB,, | Performed by: NURSE PRACTITIONER

## 2022-10-31 PROCEDURE — 3078F DIAST BP <80 MM HG: CPT | Mod: CPTII,S$GLB,, | Performed by: NURSE PRACTITIONER

## 2022-10-31 PROCEDURE — 3074F SYST BP LT 130 MM HG: CPT | Mod: CPTII,S$GLB,, | Performed by: NURSE PRACTITIONER

## 2022-10-31 PROCEDURE — 99999 PR PBB SHADOW E&M-EST. PATIENT-LVL IV: CPT | Mod: PBBFAC,,, | Performed by: NURSE PRACTITIONER

## 2022-10-31 PROCEDURE — 3074F PR MOST RECENT SYSTOLIC BLOOD PRESSURE < 130 MM HG: ICD-10-PCS | Mod: CPTII,S$GLB,, | Performed by: NURSE PRACTITIONER

## 2022-10-31 PROCEDURE — 1160F RVW MEDS BY RX/DR IN RCRD: CPT | Mod: CPTII,S$GLB,, | Performed by: NURSE PRACTITIONER

## 2022-10-31 PROCEDURE — 99999 PR PBB SHADOW E&M-EST. PATIENT-LVL IV: ICD-10-PCS | Mod: PBBFAC,,, | Performed by: NURSE PRACTITIONER

## 2022-10-31 PROCEDURE — 99214 OFFICE O/P EST MOD 30 MIN: CPT | Mod: S$GLB,,, | Performed by: NURSE PRACTITIONER

## 2022-10-31 PROCEDURE — 99214 PR OFFICE/OUTPT VISIT, EST, LEVL IV, 30-39 MIN: ICD-10-PCS | Mod: S$GLB,,, | Performed by: NURSE PRACTITIONER

## 2022-10-31 RX ORDER — BUPROPION HYDROCHLORIDE 150 MG/1
150 TABLET ORAL DAILY
Qty: 30 TABLET | Refills: 0 | Status: SHIPPED | OUTPATIENT
Start: 2022-10-31 | End: 2022-11-29 | Stop reason: SDUPTHER

## 2022-10-31 RX ORDER — DUPILUMAB 300 MG/2ML
INJECTION, SOLUTION SUBCUTANEOUS
COMMUNITY
Start: 2022-09-30

## 2022-10-31 NOTE — PROGRESS NOTES
Subjective:       Patient ID: Elisha Grimes is a 27 y.o. female.    Chief Complaint: Dizziness (Patient report at times when she stands up she feel dizzy- Orthostatic was low 3 weeks ago)        Patient is a 27 year old white female with ADHD, Acne Vulgaris, Eczema that is treated by Dermatology, and history of chronic GERD that is here today with intermittent dizziness that is situational and with standing, situational anxiety and report of gaining weight since off vyvanse and unable to lose with diet and exercise.     Intermittent Dizzines  Reports episodes are intermittent and occur in work environment.  She does report it seems to happen with standing/position changes  She does also report that the dizziness does seem to occur more when in more intense environments like when rounding as a group with certain providers rather than when rounding alone, etc.  She states she was orthostatic 3 weeks ago but with further discussion - NOT Orthostasis as she states her BP had an initial drop but then HIGH with standing.  NOT Orthostac today  Lyin/63, HR 60  Sittin/64, HR 65  Standin/64, HR 69  NEGATIVE Nehal Vincent-Roseau testing  She reports she HAS NOT been taking the Spironolactone medication.    Anxiety - Situational  Reports since off of Vyvanse she does find she has noted some situational anxiety present at times  She reports anxious more in group rounding and with rounds with certain providers but functions well in trauma situations and individual rounding.  Also ADHD - she can sometimes feels overwhelmed but stimulants caused significant mood changes  Will START Wellbutrin  mg daily and recheck in 4 weeks.    ADHD  WAS on Vyvanse at 50 mg daily when in school  Patient graduated [employer] with a 4 year degree in Biology and GRADUATED [employer] program in [place] in May 2021.    She has been OFF of ADHD medication since May 2021 after graduating school.  She now works as  "a PA in TRAUMA ER at [employer]  She reports that she found the Vyvanse made her irritible and mean.  She reports she manages her ADHD at work mostly due to busy task-oriented schedule. However, she reports she does struggle and have to make lists, etc to get routine activities completed.  She notes that she does have recurring situaltional anixety.  Will Start Wellbutrin  mg daily and recheck in 4 weeks.     Weight Changes  Reports after she got off of Vyvanse, she gained 30 pounds and reports she was able to lose some of it with diet and exercise.  Body mass index is 26 kg/m².    Acne Vulgaris and Eczema   are treated by Dermatologist, Dr. Maria Luz Martinez.         Review of Systems   Constitutional:  Positive for unexpected weight change. Negative for activity change.   HENT:  Negative for hearing loss, rhinorrhea and trouble swallowing.    Eyes:  Negative for discharge and visual disturbance.   Respiratory:  Negative for chest tightness and wheezing.    Cardiovascular:  Negative for chest pain and palpitations.   Gastrointestinal:  Negative for blood in stool, constipation, diarrhea and vomiting.   Endocrine: Negative for polydipsia and polyuria.   Genitourinary:  Negative for difficulty urinating, dysuria, hematuria and menstrual problem.   Musculoskeletal:  Negative for arthralgias, joint swelling and neck pain.   Neurological:  Positive for dizziness and light-headedness. Negative for weakness and headaches.   Psychiatric/Behavioral:  Positive for decreased concentration. Negative for confusion, dysphoric mood, self-injury and sleep disturbance. The patient is nervous/anxious.        Objective:     Vitals:    10/31/22 0912   BP: 116/72   BP Location: Left arm   Patient Position: Sitting   BP Method: Large (Manual)   Pulse: 74   Temp: 98.6 °F (37 °C)   TempSrc: Temporal   SpO2: 99%   Weight: 75.3 kg (166 lb)   Height: 5' 7" (1.702 m)          Physical Exam  Constitutional:       General: She is not in acute " distress.     Appearance: Normal appearance. She is not ill-appearing, toxic-appearing or diaphoretic.      Comments: Body mass index is 26 kg/m².     HENT:      Head: Normocephalic and atraumatic.      Right Ear: Tympanic membrane, ear canal and external ear normal. There is no impacted cerumen.      Left Ear: Tympanic membrane, ear canal and external ear normal. There is no impacted cerumen.      Ears:      Comments: Negative merary singh-pike testing.     Nose: No congestion or rhinorrhea.      Mouth/Throat:      Pharynx: No oropharyngeal exudate or posterior oropharyngeal erythema.   Eyes:      General:         Right eye: No discharge.         Left eye: No discharge.      Extraocular Movements: Extraocular movements intact.      Conjunctiva/sclera: Conjunctivae normal.      Pupils: Pupils are equal, round, and reactive to light.   Neck:      Vascular: No carotid bruit.   Cardiovascular:      Rate and Rhythm: Normal rate and regular rhythm.      Heart sounds: Normal heart sounds.   Pulmonary:      Effort: Pulmonary effort is normal. No respiratory distress.      Breath sounds: Normal breath sounds. No stridor. No wheezing, rhonchi or rales.   Abdominal:      General: There is no distension.   Musculoskeletal:         General: No swelling or deformity. Normal range of motion.      Cervical back: Normal range of motion.      Right lower leg: No edema.      Left lower leg: No edema.   Lymphadenopathy:      Cervical: No cervical adenopathy.   Skin:     General: Skin is warm and dry.      Findings: No rash.   Neurological:      Mental Status: She is alert and oriented to person, place, and time.   Psychiatric:         Mood and Affect: Mood normal.         Behavior: Behavior normal.         Thought Content: Thought content normal.         Judgment: Judgment normal.         Assessment:         ICD-10-CM ICD-9-CM   1. Dizziness on standing  R42 780.4   2. Situational anxiety  F41.8 300.09   3. Attention deficit hyperactivity  disorder (ADHD), combined type  F90.2 314.01   4. Encounter for blood test for routine general physical examination  Z00.00 V72.62   5. Screening for deficiency anemia  Z13.0 V78.1   6. Thyroid disorder screen  Z13.29 V77.0   7. Screening cholesterol level  Z13.220 V77.91   8. Diabetes mellitus screening  Z13.1 V77.1       Plan:       Dizziness on standing  Negative merary hallpike testing; normal orthostatic vital signs  Some situational anxiety that could be exacerbating the lightheadedness  Will get blood work to rule out thyroid, vitamin deficiencies, etc.  -     Vitamin D 25 hydroxy; Future; Expected date: 10/31/2022  -     Vitamin B12; Future; Expected date: 10/31/2022  -     Iron and TIBC; Future; Expected date: 10/31/2022  -     Ferritin; Future; Expected date: 10/31/2022    Situational anxiety  Start Wellbutrin  mg daily in AM and reassess in 4 weeks.  -     buPROPion (WELLBUTRIN XL) 150 MG TB24 tablet; Take 1 tablet (150 mg total) by mouth once daily.  Dispense: 30 tablet; Refill: 0    Attention deficit hyperactivity disorder (ADHD), combined type  Start Wellbutrin  mg daily and reassess in 4 weeks.  -     buPROPion (WELLBUTRIN XL) 150 MG TB24 tablet; Take 1 tablet (150 mg total) by mouth once daily.  Dispense: 30 tablet; Refill: 0    Encounter for blood test for routine general physical examination  -     CBC Auto Differential; Future; Expected date: 10/31/2022  -     Comprehensive Metabolic Panel; Future; Expected date: 10/31/2022  -     Lipid Panel; Future; Expected date: 10/31/2022  -     TSH; Future; Expected date: 10/31/2022    Screening for deficiency anemia  -     CBC Auto Differential; Future; Expected date: 10/31/2022    Thyroid disorder screen  -     TSH; Future; Expected date: 10/31/2022    Screening cholesterol level  -     Lipid Panel; Future; Expected date: 10/31/2022    Diabetes mellitus screening  -     Comprehensive Metabolic Panel; Future; Expected date: 10/31/2022      Follow  up in about 4 weeks (around 11/28/2022) for fasting labs and WELLNESS EXAM.     Patient's Medications   New Prescriptions    BUPROPION (WELLBUTRIN XL) 150 MG TB24 TABLET    Take 1 tablet (150 mg total) by mouth once daily.   Previous Medications    ACZONE 7.5 % GLWP    SPOT TREAT FACE EVERY MORNING    CLOBETASOL 0.05% (TEMOVATE) 0.05 % OINT    Apply to the affected area twice daily for 2 weeks out of month or 2 days out of the week.  Avoid face.    DICYCLOMINE (BENTYL) 10 MG CAPSULE    Take 1 capsule (10 mg total) by mouth 3 (three) times daily as needed (abdominal pain).    DUPIXENT  MG/2 ML PNIJ        HYDROCORTISONE 2.5 % OINTMENT    Apply 1 application twice daily up to 2 weeks on face and neck.    NORETHINDRONE-E.ESTRADIOL-IRON (LO LOESTRIN FE) 1 MG-10 MCG (24)/10 MCG (2) TAB    Take 1 tablet by mouth once daily    PANTOPRAZOLE (PROTONIX) 40 MG TABLET    Take 1 tablet (40 mg total) by mouth once daily.    SPIRONOLACTONE (ALDACTONE) 50 MG TABLET    Take 1 tablet (50 mg total) by mouth 2 (two) times daily.    TACROLIMUS (PROTOPIC) 0.1 % OINTMENT    1 application 2 (two) times daily.    TRIAMCINOLONE ACETONIDE 0.1% (KENALOG) 0.1 % CREAM    Apply to areas of eczema on body twice a day    VALACYCLOVIR (VALTREX) 1000 MG TABLET    Take 2 tablets (2,000 mg total) by mouth 2 (two) times daily.   Modified Medications    No medications on file   Discontinued Medications    CYCLOBENZAPRINE (FLEXERIL) 5 MG TABLET    Take 1 tablet (5 mg total) by mouth 3 (three) times daily as needed for Muscle spasms (TMJ).    HYDROXYZINE HCL (ATARAX) 25 MG TABLET    Take 1 to 2 tablets (25 mg total) by mouth nightly.    LISDEXAMFETAMINE (VYVANSE) 50 MG CAPSULE    Take 1 capsule (50 mg total) by mouth every morning.    NAPROXEN (NAPROSYN) 375 MG TABLET    Take 375 mg by mouth.       Past Medical History:   Diagnosis Date    Acne vulgaris 08/06/2018    TREATED BY CANDI QUINTEROS    Attention deficit hyperactivity disorder (ADHD), combined  type     Eczema        Past Surgical History:   Procedure Laterality Date    ESOPHAGOGASTRODUODENOSCOPY N/A 5/25/2021    Procedure: EGD (ESOPHAGOGASTRODUODENOSCOPY);  Surgeon: Carol Castro MD;  Location: [facility];  Service: Endoscopy;  Laterality: N/A;       Family History   Problem Relation Age of Onset    No Known Problems Mother     Heart disease Father         passed age 63 Acute MI; had prior cardiac arrest events prior -     Hypertension Father     Hyperlipidemia Father     No Known Problems Brother     Emphysema Maternal Grandmother        Social History     Socioeconomic History    Marital status: Single   Occupational History    Occupation: student   Tobacco Use    Smoking status: Never    Smokeless tobacco: Never   Substance and Sexual Activity    Alcohol use: Yes     Comment: once monthly on average; vodka and water usually 2 drinks on a night she drinks    Drug use: No    Sexual activity: Yes     Partners: Male     Birth control/protection: OCP   Social History Narrative    [employer] - major Biology.      NOW PA - TRAUMA PA     Social Determinants of Health     Financial Resource Strain: Low Risk     Difficulty of Paying Living Expenses: Not hard at all   Food Insecurity: No Food Insecurity    Worried About Running Out of Food in the Last Year: Never true    Ran Out of Food in the Last Year: Never true   Transportation Needs: No Transportation Needs    Lack of Transportation (Medical): No    Lack of Transportation (Non-Medical): No   Physical Activity: Sufficiently Active    Days of Exercise per Week: 4 days    Minutes of Exercise per Session: 40 min   Stress: Stress Concern Present    Feeling of Stress : To some extent   Social Connections: Unknown    Frequency of Communication with Friends and Family: More than three times a week    Frequency of Social Gatherings with Friends and Family: More than three times a week    Active Member of Clubs or Organizations: No    Attends Club or  Organization Meetings: Patient refused    Marital Status: Living with partner   Housing Stability: Low Risk     Unable to Pay for Housing in the Last Year: No    Number of Places Lived in the Last Year: 1    Unstable Housing in the Last Year: No

## 2022-11-29 ENCOUNTER — OFFICE VISIT (OUTPATIENT)
Dept: FAMILY MEDICINE | Facility: CLINIC | Age: 27
End: 2022-11-29
Payer: COMMERCIAL

## 2022-11-29 VITALS
HEART RATE: 72 BPM | DIASTOLIC BLOOD PRESSURE: 66 MMHG | OXYGEN SATURATION: 98 % | SYSTOLIC BLOOD PRESSURE: 104 MMHG | TEMPERATURE: 98 F | WEIGHT: 164.25 LBS | BODY MASS INDEX: 26.4 KG/M2 | HEIGHT: 66 IN

## 2022-11-29 DIAGNOSIS — L30.9 ECZEMA, UNSPECIFIED TYPE: ICD-10-CM

## 2022-11-29 DIAGNOSIS — L70.0 ACNE VULGARIS: ICD-10-CM

## 2022-11-29 DIAGNOSIS — F41.8 SITUATIONAL ANXIETY: ICD-10-CM

## 2022-11-29 DIAGNOSIS — F90.2 ATTENTION DEFICIT HYPERACTIVITY DISORDER (ADHD), COMBINED TYPE: ICD-10-CM

## 2022-11-29 DIAGNOSIS — Z00.00 ANNUAL PHYSICAL EXAM: Primary | ICD-10-CM

## 2022-11-29 PROBLEM — K21.9 GERD (GASTROESOPHAGEAL REFLUX DISEASE): Status: RESOLVED | Noted: 2021-05-25 | Resolved: 2022-11-29

## 2022-11-29 PROBLEM — K21.9 GASTROESOPHAGEAL REFLUX DISEASE: Status: RESOLVED | Noted: 2021-05-06 | Resolved: 2022-11-29

## 2022-11-29 PROBLEM — Z01.818 PREOPERATIVE EXAMINATION: Status: RESOLVED | Noted: 2021-05-06 | Resolved: 2022-11-29

## 2022-11-29 PROCEDURE — 3008F BODY MASS INDEX DOCD: CPT | Mod: CPTII,S$GLB,, | Performed by: NURSE PRACTITIONER

## 2022-11-29 PROCEDURE — 99999 PR PBB SHADOW E&M-EST. PATIENT-LVL IV: CPT | Mod: PBBFAC,,, | Performed by: NURSE PRACTITIONER

## 2022-11-29 PROCEDURE — 3078F DIAST BP <80 MM HG: CPT | Mod: CPTII,S$GLB,, | Performed by: NURSE PRACTITIONER

## 2022-11-29 PROCEDURE — 1159F PR MEDICATION LIST DOCUMENTED IN MEDICAL RECORD: ICD-10-PCS | Mod: CPTII,S$GLB,, | Performed by: NURSE PRACTITIONER

## 2022-11-29 PROCEDURE — 3074F PR MOST RECENT SYSTOLIC BLOOD PRESSURE < 130 MM HG: ICD-10-PCS | Mod: CPTII,S$GLB,, | Performed by: NURSE PRACTITIONER

## 2022-11-29 PROCEDURE — 1160F PR REVIEW ALL MEDS BY PRESCRIBER/CLIN PHARMACIST DOCUMENTED: ICD-10-PCS | Mod: CPTII,S$GLB,, | Performed by: NURSE PRACTITIONER

## 2022-11-29 PROCEDURE — 1160F RVW MEDS BY RX/DR IN RCRD: CPT | Mod: CPTII,S$GLB,, | Performed by: NURSE PRACTITIONER

## 2022-11-29 PROCEDURE — 3074F SYST BP LT 130 MM HG: CPT | Mod: CPTII,S$GLB,, | Performed by: NURSE PRACTITIONER

## 2022-11-29 PROCEDURE — 3008F PR BODY MASS INDEX (BMI) DOCUMENTED: ICD-10-PCS | Mod: CPTII,S$GLB,, | Performed by: NURSE PRACTITIONER

## 2022-11-29 PROCEDURE — 99999 PR PBB SHADOW E&M-EST. PATIENT-LVL IV: ICD-10-PCS | Mod: PBBFAC,,, | Performed by: NURSE PRACTITIONER

## 2022-11-29 PROCEDURE — 99395 PR PREVENTIVE VISIT,EST,18-39: ICD-10-PCS | Mod: S$GLB,,, | Performed by: NURSE PRACTITIONER

## 2022-11-29 PROCEDURE — 3078F PR MOST RECENT DIASTOLIC BLOOD PRESSURE < 80 MM HG: ICD-10-PCS | Mod: CPTII,S$GLB,, | Performed by: NURSE PRACTITIONER

## 2022-11-29 PROCEDURE — 99395 PREV VISIT EST AGE 18-39: CPT | Mod: S$GLB,,, | Performed by: NURSE PRACTITIONER

## 2022-11-29 PROCEDURE — 1159F MED LIST DOCD IN RCRD: CPT | Mod: CPTII,S$GLB,, | Performed by: NURSE PRACTITIONER

## 2022-11-29 RX ORDER — BUPROPION HYDROCHLORIDE 150 MG/1
150 TABLET ORAL DAILY
Qty: 90 TABLET | Refills: 1 | Status: SHIPPED | OUTPATIENT
Start: 2022-11-29 | End: 2023-01-02 | Stop reason: SDUPTHER

## 2022-11-29 NOTE — PROGRESS NOTES
Subjective:       Patient ID: Elisha Grimes is a 27 y.o. female.    Chief Complaint: Annual Exam and Medication Refill    Medication Refill  Pertinent negatives include no abdominal pain, arthralgias, chest pain, chills, congestion, coughing, fatigue, fever, headaches, joint swelling, myalgias, nausea, rash, sore throat, vomiting or weakness.     Patient is a 27 year old white female with ADHD, Acne Vulgaris, Eczema that is treated by Dermatology, and history of chronic GERD that is here today for ANNUAL physical exam with fasting lab results.     Anxiety - Situational  Reports since off of Vyvanse she does find she has noted some situational anxiety present at times  She reports anxious more in group rounding and with rounds with certain providers but functions well in trauma situations and individual rounding.  Also ADHD - she can sometimes feels overwhelmed but stimulants caused significant mood changes  STARTED Wellbutrin  mg daily and reports symptoms have improved.     ADHD  WAS on Vyvanse at 50 mg daily when in school  Patient graduated [employer] with a 4 year degree in Biology and GRADUATED [facility] [employer] [facility] program in [place] in May 2021.    She has been OFF of ADHD medication since May 2021 after graduating school.  She now works as a PA in TRAUMA ER at [employer]  She reports that she found the Vyvanse made her irritible and mean.  She reports she manages her ADHD at work mostly due to busy task-oriented schedule. However, she reports she does struggle and have to make lists, etc to get routine activities completed.  She notes that she does have recurring situaltional anixety.  Started Wellbutrin  mg daily and tolerating medication well.     Weight Changes  Reports after she got off of Vyvanse, sh gained 30 pounds and reports she was able to lose some of it with diet and exercise.  Body mass index is 26.51 kg/m².       Acne Vulgaris and Eczema   are treated by Dermatologist,   Maria Luz Martinez.    Wellness Labs:  CBC WNL  CMP WNL  Cholesterol WNL  TSH WNL  Iron levels normal  Vitamin D mildly low at 27 - recommend supplement  Vitamin B12 okay at 327 but on low end of normal - recommend supplement    Health Maintenance:  Up to date on PAP SMEAR 3/8/22    Component      Latest Ref Rng & Units 11/23/2022 4/20/2021 7/31/2018   WBC      3.90 - 12.70 K/uL 5.28 4.77 4.84   RBC      4.00 - 5.40 M/uL 4.58 4.27 4.22   Hemoglobin      12.0 - 16.0 g/dL 13.3 12.7 12.4   Hematocrit      37.0 - 48.5 % 39.8 38.1 37.3   MCV      82 - 98 fL 87 89 88   MCH      27.0 - 31.0 pg 29.0 29.7 29.4   MCHC      32.0 - 36.0 g/dL 33.4 33.3 33.2   RDW      11.5 - 14.5 % 11.9 12.1 12.6   Platelets      150 - 450 K/uL 306 296 266   MPV      9.2 - 12.9 fL 10.7 9.8 10.3   Immature Granulocytes      0.0 - 0.5 % 0.4 0.2    Gran # (ANC)      1.8 - 7.7 K/uL 3.5 2.2 2.3   Immature Grans (Abs)      0.00 - 0.04 K/uL 0.02 0.01    Lymph #      1.0 - 4.8 K/uL 1.2 1.9 1.9   Mono #      0.3 - 1.0 K/uL 0.4 0.4 0.3   Eos #      0.0 - 0.5 K/uL 0.1 0.2 0.3   Baso #      0.00 - 0.20 K/uL 0.04 0.04 0.03   nRBC      0 /100 WBC 0 0    Gran %      38.0 - 73.0 % 67.0 47.0 46.7   Lymph %      18.0 - 48.0 % 23.3 38.8 39.9   Mono %      4.0 - 15.0 % 6.6 8.2 7.0   Eosinophil %      0.0 - 8.0 % 1.9 5.0 5.6   Basophil %      0.0 - 1.9 % 0.8 0.8 0.6   Differential Method       Automated Automated Automated   Sodium      136 - 145 mmol/L 139 141 140   Potassium      3.5 - 5.1 mmol/L 4.2 4.0 4.1   Chloride      95 - 110 mmol/L 107 106 104   CO2      23 - 29 mmol/L 26 26 29   Glucose      70 - 110 mg/dL 95 91 86   BUN      7 - 17 mg/dL 12 13 9   Creatinine      0.50 - 1.40 mg/dL 0.61 0.71 0.67   Calcium      8.7 - 10.5 mg/dL 8.9 9.6 9.4   PROTEIN TOTAL      6.0 - 8.4 g/dL 7.2 7.3 7.5   Albumin      3.5 - 5.2 g/dL 4.4 4.4 4.5   BILIRUBIN TOTAL      0.1 - 1.0 mg/dL 0.2 0.3 0.3   Alkaline Phosphatase      38 - 126 U/L 55 47 52   AST      15 - 46 U/L 21 27 24    ALT      10 - 44 U/L 18 17 18   Anion Gap      8 - 16 mmol/L 6 (L) 9 7 (L)   eGFR      >60 mL/min/1.73 m:2 >60.0     Cholesterol      120 - 199 mg/dL 164 166 140   Triglycerides      30 - 150 mg/dL 53 85 68   HDL      40 - 75 mg/dL 49 46 47   LDL Cholesterol External      63.0 - 159.0 mg/dL 104.4 103.0 79.4   HDL/Cholesterol Ratio      20.0 - 50.0 % 29.9 27.7 33.6   Total Cholesterol/HDL Ratio      2.0 - 5.0 3.3 3.6 3.0   Non-HDL Cholesterol      mg/dL 115 120 93   Iron      30 - 160 ug/dL 103     Transferrin      200 - 375 mg/dL 215     TIBC      250 - 450 ug/dL 318     Saturated Iron      20 - 50 % 32     TSH      0.400 - 4.000 uIU/mL 0.840 1.080 0.558   Vit D, 25-Hydroxy      30 - 96 ng/mL 27 (L)     Vitamin B-12      210 - 950 pg/mL 327     Ferritin      20.0 - 300.0 ng/mL 44       Review of Systems   Constitutional:  Negative for appetite change, chills, fatigue, fever and unexpected weight change.   HENT:  Negative for congestion, ear pain, mouth sores, nosebleeds, postnasal drip, rhinorrhea, sinus pressure, sneezing, sore throat, trouble swallowing and voice change.    Eyes:  Negative for photophobia, pain, discharge, redness, itching and visual disturbance.   Respiratory:  Negative for cough, chest tightness and shortness of breath.    Cardiovascular:  Negative for chest pain, palpitations and leg swelling.   Gastrointestinal:  Negative for abdominal pain, blood in stool, constipation, diarrhea, nausea and vomiting.   Genitourinary:  Negative for dysuria, frequency, hematuria and urgency.   Musculoskeletal:  Negative for arthralgias, back pain, joint swelling and myalgias.   Skin:  Negative for color change and rash.   Allergic/Immunologic: Negative for immunocompromised state.   Neurological:  Negative for dizziness, seizures, syncope, weakness and headaches.   Hematological:  Negative for adenopathy. Does not bruise/bleed easily.   Psychiatric/Behavioral:  Negative for agitation, dysphoric mood, sleep  "disturbance and suicidal ideas. The patient is not nervous/anxious.        Objective:     Vitals:    11/29/22 1051   BP: 104/66   BP Location: Left arm   Patient Position: Sitting   BP Method: Large (Manual)   Pulse: 72   Temp: 98.1 °F (36.7 °C)   TempSrc: Temporal   SpO2: 98%   Weight: 74.5 kg (164 lb 3.9 oz)   Height: 5' 6" (1.676 m)          Physical Exam  Constitutional:       General: She is not in acute distress.     Appearance: Normal appearance. She is well-developed and normal weight. She is not ill-appearing, toxic-appearing or diaphoretic.      Comments: Body mass index is 26.51 kg/m².       HENT:      Head: Normocephalic and atraumatic.      Right Ear: Tympanic membrane, ear canal and external ear normal. There is no impacted cerumen.      Left Ear: Tympanic membrane, ear canal and external ear normal. There is no impacted cerumen.      Nose: Nose normal. No congestion.      Mouth/Throat:      Pharynx: No oropharyngeal exudate.   Eyes:      General: No scleral icterus.        Right eye: No discharge.         Left eye: No discharge.      Extraocular Movements: Extraocular movements intact.      Conjunctiva/sclera: Conjunctivae normal.      Pupils: Pupils are equal, round, and reactive to light.   Neck:      Thyroid: No thyromegaly.      Trachea: No tracheal deviation.   Cardiovascular:      Rate and Rhythm: Normal rate and regular rhythm.      Heart sounds: Normal heart sounds. No murmur heard.  Pulmonary:      Effort: Pulmonary effort is normal. No respiratory distress.      Breath sounds: Normal breath sounds. No stridor. No wheezing, rhonchi or rales.   Abdominal:      General: There is no distension.      Palpations: There is no mass.      Hernia: No hernia is present.   Musculoskeletal:         General: No swelling. Normal range of motion.      Cervical back: Normal range of motion and neck supple.      Right lower leg: No edema.      Left lower leg: No edema.   Lymphadenopathy:      Cervical: No " cervical adenopathy.   Skin:     General: Skin is warm and dry.   Neurological:      Mental Status: She is alert and oriented to person, place, and time.      Cranial Nerves: No cranial nerve deficit.      Coordination: Coordination normal.   Psychiatric:         Mood and Affect: Mood normal.         Behavior: Behavior normal.         Thought Content: Thought content normal.         Judgment: Judgment normal.         Assessment:         ICD-10-CM ICD-9-CM   1. Annual physical exam  Z00.00 V70.0   2. Attention deficit hyperactivity disorder (ADHD), combined type  F90.2 314.01   3. Situational anxiety  F41.8 300.09   4. Eczema, unspecified type  L30.9 692.9   5. Acne vulgaris  L70.0 706.1       Plan:       Annual physical exam    Health Maintenance Summary     Full History      Expand All  Collapse All    Postponed - COVID-19 Vaccine  (3 - Booster for Pfizer series)  Postponed until 2/1/2023 01/27/2021  Imm Admin: COVID-19, MRNA, LN-S, PF (Pfizer) (Purple Cap)    01/08/2021  Imm Admin: COVID-19, MRNA, LN-S, PF (Pfizer) (Purple Cap)      Postponed - Pneumococcal Vaccines (Age 0-64)  (1 - PCV)  Postponed until 10/31/2023  No completion history exists for this topic.     Pap Smear  (Every 3 Years)  Next due on 3/8/2025  03/08/2022  Outside Procedure: CHG CYTOPAT,CER/VAG,THIN LAYER,MAN RES,INTER    03/16/2019   PAP SMEAR    01/05/2017  Done - Dr. Giles      TETANUS VACCINE  (Every 10 Years)  Next due on 10/25/2028  10/25/2018  Imm Admin: Tdap    05/03/2007  Imm Admin: Tdap      Hepatitis C Screening  Completed  04/20/2021  Hepatitis C Ab component of Hepatitis C Antibody      HIV Screening  Completed  04/20/2021  HIV 1/2 Ag/Ab (4th Gen)      Influenza Vaccine  (Series Information)  Completed  10/19/2022  Imm Admin: Influenza - Quadrivalent    10/22/2021  Imm Admin: Influenza - Quadrivalent    10/01/2020  Imm Admin: Influenza    09/29/2020  Imm Admin: Influenza - Quadrivalent - PF *Preferred* (6 months and older)     09/29/2020  Imm Admin: Influenza - Quadrivalent    View More History     Lipid Panel  Completed  11/23/2022  Lipid Panel    04/20/2021  Lipid Panel    07/31/2018  Lipid panel        Attention deficit hyperactivity disorder (ADHD), combined type  Controlled on medication - follow up in 6 months.  -     buPROPion (WELLBUTRIN XL) 150 MG TB24 tablet; Take 1 tablet (150 mg total) by mouth once daily.  Dispense: 90 tablet; Refill: 1    Situational anxiety  -     buPROPion (WELLBUTRIN XL) 150 MG TB24 tablet; Take 1 tablet (150 mg total) by mouth once daily.  Dispense: 90 tablet; Refill: 1    Eczema, unspecified type  Condition followed/treated by Specialist.  Please follow up with Specialist as advised.      Acne vulgaris  Condition followed/treated by Specialist.  Please follow up with Specialist as advised.    Follow up in about 6 months (around 5/29/2023) for MED CHECK ONLY.     Patient's Medications   New Prescriptions    No medications on file   Previous Medications    ACZONE 7.5 % GLWP    SPOT TREAT FACE EVERY MORNING    CLOBETASOL 0.05% (TEMOVATE) 0.05 % OINT    Apply to the affected area twice daily for 2 weeks out of month or 2 days out of the week.  Avoid face.    DICYCLOMINE (BENTYL) 10 MG CAPSULE    Take 1 capsule (10 mg total) by mouth 3 (three) times daily as needed (abdominal pain).    DUPIXENT  MG/2 ML PNIJ        HYDROCORTISONE 2.5 % OINTMENT    Apply 1 application twice daily up to 2 weeks on face and neck.    NORETHINDRONE-E.ESTRADIOL-IRON (LO LOESTRIN FE) 1 MG-10 MCG (24)/10 MCG (2) TAB    Take 1 tablet by mouth once daily    SPIRONOLACTONE (ALDACTONE) 50 MG TABLET    Take 1 tablet (50 mg total) by mouth 2 (two) times daily.    TACROLIMUS (PROTOPIC) 0.1 % OINTMENT    1 application 2 (two) times daily.    TRIAMCINOLONE ACETONIDE 0.1% (KENALOG) 0.1 % CREAM    Apply to areas of eczema on body twice a day    VALACYCLOVIR (VALTREX) 1000 MG TABLET    Take 2 tablets (2,000 mg total) by mouth 2 (two)  times daily.   Modified Medications    Modified Medication Previous Medication    BUPROPION (WELLBUTRIN XL) 150 MG TB24 TABLET buPROPion (WELLBUTRIN XL) 150 MG TB24 tablet       Take 1 tablet (150 mg total) by mouth once daily.    Take 1 tablet (150 mg total) by mouth once daily.   Discontinued Medications    PANTOPRAZOLE (PROTONIX) 40 MG TABLET    Take 1 tablet (40 mg total) by mouth once daily.       Past Medical History:   Diagnosis Date    Acne vulgaris 08/06/2018    TREATED BY CANDI QUINTEROS    Attention deficit hyperactivity disorder (ADHD), combined type     Eczema        Past Surgical History:   Procedure Laterality Date    ESOPHAGOGASTRODUODENOSCOPY N/A 5/25/2021    Procedure: EGD (ESOPHAGOGASTRODUODENOSCOPY);  Surgeon: Carol Castro MD;  Location: [facility];  Service: Endoscopy;  Laterality: N/A;       Family History   Problem Relation Age of Onset    No Known Problems Mother     Heart disease Father         passed age 63 Acute MI; had prior cardiac arrest events prior -     Hypertension Father     Hyperlipidemia Father     No Known Problems Brother     Emphysema Maternal Grandmother        Social History     Socioeconomic History    Marital status: Single   Occupational History    Occupation: student   Tobacco Use    Smoking status: Never    Smokeless tobacco: Never   Substance and Sexual Activity    Alcohol use: Yes     Comment: once monthly on average; vodka and water usually 2 drinks on a night she drinks    Drug use: No    Sexual activity: Yes     Partners: Male     Birth control/protection: OCP   Social History Narrative    [employer] - major Biology.      NOW PA - TRAUMA PA     Social Determinants of Health     Financial Resource Strain: Low Risk     Difficulty of Paying Living Expenses: Not hard at all   Food Insecurity: No Food Insecurity    Worried About Running Out of Food in the Last Year: Never true    Ran Out of Food in the Last Year: Never true   Transportation Needs: No  Transportation Needs    Lack of Transportation (Medical): No    Lack of Transportation (Non-Medical): No   Physical Activity: Sufficiently Active    Days of Exercise per Week: 4 days    Minutes of Exercise per Session: 40 min   Stress: Stress Concern Present    Feeling of Stress : To some extent   Social Connections: Unknown    Frequency of Communication with Friends and Family: More than three times a week    Frequency of Social Gatherings with Friends and Family: More than three times a week    Active Member of Clubs or Organizations: No    Attends Club or Organization Meetings: Patient refused    Marital Status: Living with partner   Housing Stability: Low Risk     Unable to Pay for Housing in the Last Year: No    Number of Places Lived in the Last Year: 1    Unstable Housing in the Last Year: No

## 2023-04-05 ENCOUNTER — PATIENT MESSAGE (OUTPATIENT)
Dept: FAMILY MEDICINE | Facility: CLINIC | Age: 28
End: 2023-04-05
Payer: COMMERCIAL

## 2023-04-05 DIAGNOSIS — F90.2 ATTENTION DEFICIT HYPERACTIVITY DISORDER (ADHD), COMBINED TYPE: ICD-10-CM

## 2023-04-05 DIAGNOSIS — F41.8 SITUATIONAL ANXIETY: ICD-10-CM

## 2023-04-05 RX ORDER — BUPROPION HYDROCHLORIDE 150 MG/1
150 TABLET ORAL DAILY
Qty: 90 TABLET | Refills: 1 | Status: CANCELLED | OUTPATIENT
Start: 2023-04-05 | End: 2024-04-04

## 2023-05-11 ENCOUNTER — OFFICE VISIT (OUTPATIENT)
Dept: FAMILY MEDICINE | Facility: CLINIC | Age: 28
End: 2023-05-11
Payer: COMMERCIAL

## 2023-05-11 VITALS
HEIGHT: 66 IN | OXYGEN SATURATION: 98 % | HEART RATE: 82 BPM | BODY MASS INDEX: 25.98 KG/M2 | DIASTOLIC BLOOD PRESSURE: 60 MMHG | TEMPERATURE: 98 F | SYSTOLIC BLOOD PRESSURE: 116 MMHG | WEIGHT: 161.69 LBS

## 2023-05-11 DIAGNOSIS — Z13.1 DIABETES MELLITUS SCREENING: ICD-10-CM

## 2023-05-11 DIAGNOSIS — Z13.0 SCREENING FOR DEFICIENCY ANEMIA: ICD-10-CM

## 2023-05-11 DIAGNOSIS — F41.8 SITUATIONAL ANXIETY: ICD-10-CM

## 2023-05-11 DIAGNOSIS — Z13.29 THYROID DISORDER SCREEN: ICD-10-CM

## 2023-05-11 DIAGNOSIS — L70.0 ACNE VULGARIS: ICD-10-CM

## 2023-05-11 DIAGNOSIS — F90.2 ATTENTION DEFICIT HYPERACTIVITY DISORDER (ADHD), COMBINED TYPE: Primary | ICD-10-CM

## 2023-05-11 DIAGNOSIS — Z00.00 ENCOUNTER FOR BLOOD TEST FOR ROUTINE GENERAL PHYSICAL EXAMINATION: ICD-10-CM

## 2023-05-11 DIAGNOSIS — Z13.220 SCREENING CHOLESTEROL LEVEL: ICD-10-CM

## 2023-05-11 PROCEDURE — 1159F PR MEDICATION LIST DOCUMENTED IN MEDICAL RECORD: ICD-10-PCS | Mod: CPTII,S$GLB,, | Performed by: NURSE PRACTITIONER

## 2023-05-11 PROCEDURE — 3008F PR BODY MASS INDEX (BMI) DOCUMENTED: ICD-10-PCS | Mod: CPTII,S$GLB,, | Performed by: NURSE PRACTITIONER

## 2023-05-11 PROCEDURE — 99999 PR PBB SHADOW E&M-EST. PATIENT-LVL IV: ICD-10-PCS | Mod: PBBFAC,,, | Performed by: NURSE PRACTITIONER

## 2023-05-11 PROCEDURE — 99214 OFFICE O/P EST MOD 30 MIN: CPT | Mod: S$GLB,,, | Performed by: NURSE PRACTITIONER

## 2023-05-11 PROCEDURE — 3074F PR MOST RECENT SYSTOLIC BLOOD PRESSURE < 130 MM HG: ICD-10-PCS | Mod: CPTII,S$GLB,, | Performed by: NURSE PRACTITIONER

## 2023-05-11 PROCEDURE — 3008F BODY MASS INDEX DOCD: CPT | Mod: CPTII,S$GLB,, | Performed by: NURSE PRACTITIONER

## 2023-05-11 PROCEDURE — 3074F SYST BP LT 130 MM HG: CPT | Mod: CPTII,S$GLB,, | Performed by: NURSE PRACTITIONER

## 2023-05-11 PROCEDURE — 1160F RVW MEDS BY RX/DR IN RCRD: CPT | Mod: CPTII,S$GLB,, | Performed by: NURSE PRACTITIONER

## 2023-05-11 PROCEDURE — 99999 PR PBB SHADOW E&M-EST. PATIENT-LVL IV: CPT | Mod: PBBFAC,,, | Performed by: NURSE PRACTITIONER

## 2023-05-11 PROCEDURE — 99214 PR OFFICE/OUTPT VISIT, EST, LEVL IV, 30-39 MIN: ICD-10-PCS | Mod: S$GLB,,, | Performed by: NURSE PRACTITIONER

## 2023-05-11 PROCEDURE — 3078F PR MOST RECENT DIASTOLIC BLOOD PRESSURE < 80 MM HG: ICD-10-PCS | Mod: CPTII,S$GLB,, | Performed by: NURSE PRACTITIONER

## 2023-05-11 PROCEDURE — 1160F PR REVIEW ALL MEDS BY PRESCRIBER/CLIN PHARMACIST DOCUMENTED: ICD-10-PCS | Mod: CPTII,S$GLB,, | Performed by: NURSE PRACTITIONER

## 2023-05-11 PROCEDURE — 3078F DIAST BP <80 MM HG: CPT | Mod: CPTII,S$GLB,, | Performed by: NURSE PRACTITIONER

## 2023-05-11 PROCEDURE — 1159F MED LIST DOCD IN RCRD: CPT | Mod: CPTII,S$GLB,, | Performed by: NURSE PRACTITIONER

## 2023-05-11 RX ORDER — BUPROPION HYDROCHLORIDE 150 MG/1
150 TABLET ORAL DAILY
Qty: 30 TABLET | Refills: 5 | Status: SHIPPED | OUTPATIENT
Start: 2023-05-11 | End: 2023-12-21 | Stop reason: SDUPTHER

## 2023-05-11 RX ORDER — SPIRONOLACTONE 50 MG/1
50 TABLET, FILM COATED ORAL 2 TIMES DAILY
Qty: 60 TABLET | Refills: 0 | Status: SHIPPED | OUTPATIENT
Start: 2023-05-11

## 2023-05-11 NOTE — PROGRESS NOTES
Subjective:       Patient ID: Elisha Grimes is a 27 y.o. female.    Chief Complaint: Follow-up (6 months Med Check)    HPI    Patient is a 27 year old white female with ADHD, Acne Vulgaris, Eczema that is treated by Dermatology, and history of chronic GERD that is here today for 6 month follow up.     Anxiety - Situational  Reports since off of Vyvanse she does find she has noted some situational anxiety present at times  She reports anxious more in group rounding and with rounds with certain providers but functions well in trauma situations and individual rounding.  Also ADHD - she can sometimes feels overwhelmed but stimulants caused significant mood changes  STARTED Wellbutrin  mg daily and reports symptoms have improved.     ADHD  WAS on Vyvanse at 50 mg daily when in school  Patient graduated [employer] with a 4 year degree in Biology and GRADUATED [employer] program in [place] in May 2021.    She has been OFF of ADHD medication since May 2021 after graduating school.  She now works as a PA in TRAUMA ER at [employer]  She reports that she found the Vyvanse made her irritible and mean.  She reports she manages her ADHD at work mostly due to busy task-oriented schedule. However, she reports she does struggle and have to make lists, etc to get routine activities completed.  She notes that she does have recurring situaltional anixety.  Started Wellbutrin  mg daily and tolerating medication well.     Acne Vulgaris and Eczema   are treated by Dermatologist, Dr. Maria Luz Martinez.         Review of Systems   Constitutional:  Negative for appetite change, chills, fatigue, fever and unexpected weight change.   HENT:  Negative for congestion, ear pain, mouth sores, nosebleeds, postnasal drip, rhinorrhea, sinus pressure, sneezing, sore throat, trouble swallowing and voice change.    Eyes:  Negative for photophobia, pain, discharge, redness, itching and visual disturbance.   Respiratory:  Negative for  "cough, chest tightness and shortness of breath.    Cardiovascular:  Negative for chest pain, palpitations and leg swelling.   Gastrointestinal:  Negative for abdominal pain, blood in stool, constipation, diarrhea, nausea and vomiting.   Genitourinary:  Negative for dysuria, frequency, hematuria and urgency.   Musculoskeletal:  Negative for arthralgias, back pain, joint swelling and myalgias.   Skin:  Negative for color change and rash.   Allergic/Immunologic: Negative for immunocompromised state.   Neurological:  Negative for dizziness, seizures, syncope, weakness and headaches.   Hematological:  Negative for adenopathy. Does not bruise/bleed easily.   Psychiatric/Behavioral:  Negative for agitation, dysphoric mood, sleep disturbance and suicidal ideas. The patient is not nervous/anxious.        Objective:     Vitals:    05/11/23 1037   BP: 116/60   BP Location: Right arm   Patient Position: Sitting   BP Method: Large (Manual)   Pulse: 82   Temp: 98.4 °F (36.9 °C)   TempSrc: Temporal   SpO2: 98%   Weight: 73.4 kg (161 lb 11.3 oz)   Height: 5' 6" (1.676 m)          Physical Exam  Constitutional:       General: She is not in acute distress.     Appearance: Normal appearance. She is well-developed and normal weight. She is not ill-appearing, toxic-appearing or diaphoretic.      Comments: Body mass index is 26.1 kg/m².   HENT:      Head: Normocephalic and atraumatic.      Right Ear: Tympanic membrane, ear canal and external ear normal. There is no impacted cerumen.      Left Ear: Tympanic membrane, ear canal and external ear normal. There is no impacted cerumen.      Nose: Nose normal. No congestion.      Mouth/Throat:      Pharynx: No oropharyngeal exudate.   Eyes:      General: No scleral icterus.        Right eye: No discharge.         Left eye: No discharge.      Extraocular Movements: Extraocular movements intact.      Conjunctiva/sclera: Conjunctivae normal.      Pupils: Pupils are equal, round, and reactive to " light.   Neck:      Thyroid: No thyromegaly.      Trachea: No tracheal deviation.   Cardiovascular:      Rate and Rhythm: Normal rate and regular rhythm.      Heart sounds: Normal heart sounds. No murmur heard.  Pulmonary:      Effort: Pulmonary effort is normal. No respiratory distress.      Breath sounds: Normal breath sounds. No stridor. No wheezing, rhonchi or rales.   Abdominal:      General: There is no distension.      Palpations: There is no mass.      Hernia: No hernia is present.   Musculoskeletal:         General: No swelling. Normal range of motion.      Cervical back: Normal range of motion and neck supple.      Right lower leg: No edema.      Left lower leg: No edema.   Lymphadenopathy:      Cervical: No cervical adenopathy.   Skin:     General: Skin is warm and dry.   Neurological:      Mental Status: She is alert and oriented to person, place, and time.      Cranial Nerves: No cranial nerve deficit.      Coordination: Coordination normal.   Psychiatric:         Mood and Affect: Mood normal.         Behavior: Behavior normal.         Thought Content: Thought content normal.         Judgment: Judgment normal.         Assessment:         ICD-10-CM ICD-9-CM   1. Encounter for blood test for routine general physical examination  Z00.00 V72.62   2. Acne vulgaris  L70.0 706.1   3. Situational anxiety  F41.8 300.09   4. Attention deficit hyperactivity disorder (ADHD), combined type  F90.2 314.01   5. Screening for deficiency anemia  Z13.0 V78.1   6. Thyroid disorder screen  Z13.29 V77.0   7. Screening cholesterol level  Z13.220 V77.91   8. Diabetes mellitus screening  Z13.1 V77.1       Plan:       Attention deficit hyperactivity disorder (ADHD), combined type  Continue current medication(s).  Follow up in 6 months.  -     buPROPion (WELLBUTRIN XL) 150 MG TB24 tablet; Take 1 tablet (150 mg total) by mouth once daily.  Dispense: 30 tablet; Refill: 5    Situational anxiety  Continue current medication(s).   Follow up in 6 months.  -     buPROPion (WELLBUTRIN XL) 150 MG TB24 tablet; Take 1 tablet (150 mg total) by mouth once daily.  Dispense: 30 tablet; Refill: 5    Acne vulgaris  Followed by dermatology  -     spironolactone (ALDACTONE) 50 MG tablet; Take 1 tablet (50 mg total) by mouth 2 (two) times daily.  Dispense: 60 tablet; Refill: 0    Encounter for blood test for routine general physical examination  -     CBC Auto Differential; Future; Expected date: 05/11/2023  -     Comprehensive Metabolic Panel; Future; Expected date: 05/11/2023  -     Lipid Panel; Future; Expected date: 05/11/2023  -     TSH; Future; Expected date: 05/11/2023    Screening for deficiency anemia  -     CBC Auto Differential; Future; Expected date: 05/11/2023    Thyroid disorder screen  -     TSH; Future; Expected date: 05/11/2023    Screening cholesterol level  -     Lipid Panel; Future; Expected date: 05/11/2023    Diabetes mellitus screening  -     Comprehensive Metabolic Panel; Future; Expected date: 05/11/2023      Follow up in about 6 months (around 11/11/2023) for fasting labs and WELLNESS EXAM.     Patient's Medications   New Prescriptions    No medications on file   Previous Medications    ACZONE 7.5 % GLWP    SPOT TREAT FACE EVERY MORNING    CLOBETASOL 0.05% (TEMOVATE) 0.05 % OINT    Apply to the affected area twice daily for 2 weeks out of month or 2 days out of the week.  Avoid face.    DICYCLOMINE (BENTYL) 10 MG CAPSULE    Take 1 capsule (10 mg total) by mouth 3 (three) times daily as needed (abdominal pain).    DUPIXENT  MG/2 ML PNIJ        HYDROCORTISONE 2.5 % OINTMENT    Apply 1 application twice daily up to 2 weeks on face and neck.    NORETHINDRONE-E.ESTRADIOL-IRON (LO LOESTRIN FE) 1 MG-10 MCG (24)/10 MCG (2) TAB    Take 1 tablet by mouth once daily    TACROLIMUS (PROTOPIC) 0.1 % OINTMENT    1 application 2 (two) times daily.    TRIAMCINOLONE ACETONIDE 0.1% (KENALOG) 0.1 % CREAM    Apply to areas of eczema on body  twice a day    VALACYCLOVIR (VALTREX) 1000 MG TABLET    Take 2 tablets (2,000 mg total) by mouth 2 (two) times daily.   Modified Medications    Modified Medication Previous Medication    BUPROPION (WELLBUTRIN XL) 150 MG TB24 TABLET buPROPion (WELLBUTRIN XL) 150 MG TB24 tablet       Take 1 tablet (150 mg total) by mouth once daily.    Take 1 tablet (150 mg total) by mouth once daily.    SPIRONOLACTONE (ALDACTONE) 50 MG TABLET spironolactone (ALDACTONE) 50 MG tablet       Take 1 tablet (50 mg total) by mouth 2 (two) times daily.    Take 1 tablet (50 mg total) by mouth 2 (two) times daily.   Discontinued Medications    No medications on file       Past Medical History:   Diagnosis Date    Acne vulgaris 08/06/2018    TREATED BY CANDI QUINTEROS    Attention deficit hyperactivity disorder (ADHD), combined type     Eczema        Past Surgical History:   Procedure Laterality Date    ESOPHAGOGASTRODUODENOSCOPY N/A 5/25/2021    Procedure: EGD (ESOPHAGOGASTRODUODENOSCOPY);  Surgeon: Carol Castro MD;  Location: [facility];  Service: Endoscopy;  Laterality: N/A;       Family History   Problem Relation Age of Onset    No Known Problems Mother     Heart disease Father         passed age 63 Acute MI; had prior cardiac arrest events prior -     Hypertension Father     Hyperlipidemia Father     No Known Problems Brother     Emphysema Maternal Grandmother        Social History     Socioeconomic History    Marital status: Single   Occupational History    Occupation: student   Tobacco Use    Smoking status: Never     Passive exposure: Never    Smokeless tobacco: Never   Substance and Sexual Activity    Alcohol use: Yes     Comment: once monthly on average; vodka and water usually 2 drinks on a night she drinks    Drug use: No    Sexual activity: Yes     Partners: Male     Birth control/protection: OCP   Social History Narrative    [employer] - major Biology.      NOW PA - TRAUMA PA     Social Determinants of Health      Financial Resource Strain: Low Risk     Difficulty of Paying Living Expenses: Not hard at all   Food Insecurity: No Food Insecurity    Worried About Running Out of Food in the Last Year: Never true    Ran Out of Food in the Last Year: Never true   Transportation Needs: No Transportation Needs    Lack of Transportation (Medical): No    Lack of Transportation (Non-Medical): No   Physical Activity: Sufficiently Active    Days of Exercise per Week: 4 days    Minutes of Exercise per Session: 40 min   Stress: Stress Concern Present    Feeling of Stress : To some extent   Social Connections: Unknown    Frequency of Communication with Friends and Family: More than three times a week    Frequency of Social Gatherings with Friends and Family: More than three times a week    Active Member of Clubs or Organizations: No    Attends Club or Organization Meetings: Patient refused    Marital Status: Living with partner   Housing Stability: Low Risk     Unable to Pay for Housing in the Last Year: No    Number of Places Lived in the Last Year: 1    Unstable Housing in the Last Year: No

## 2023-12-05 RX ORDER — VALACYCLOVIR HYDROCHLORIDE 1 G/1
2000 TABLET, FILM COATED ORAL 2 TIMES DAILY PRN
Qty: 4 TABLET | Refills: 2 | Status: SHIPPED | OUTPATIENT
Start: 2023-12-05

## 2023-12-21 DIAGNOSIS — F41.8 SITUATIONAL ANXIETY: ICD-10-CM

## 2023-12-21 DIAGNOSIS — F90.2 ATTENTION DEFICIT HYPERACTIVITY DISORDER (ADHD), COMBINED TYPE: ICD-10-CM

## 2023-12-21 RX ORDER — BUPROPION HYDROCHLORIDE 150 MG/1
150 TABLET ORAL DAILY
Qty: 30 TABLET | Refills: 5 | Status: SHIPPED | OUTPATIENT
Start: 2023-12-21 | End: 2024-01-22 | Stop reason: SDUPTHER

## 2024-01-22 ENCOUNTER — OFFICE VISIT (OUTPATIENT)
Dept: FAMILY MEDICINE | Facility: CLINIC | Age: 29
End: 2024-01-22
Payer: COMMERCIAL

## 2024-01-22 VITALS
HEIGHT: 67 IN | WEIGHT: 164 LBS | SYSTOLIC BLOOD PRESSURE: 106 MMHG | OXYGEN SATURATION: 98 % | BODY MASS INDEX: 25.74 KG/M2 | DIASTOLIC BLOOD PRESSURE: 68 MMHG | HEART RATE: 81 BPM | TEMPERATURE: 98 F

## 2024-01-22 DIAGNOSIS — L30.9 ECZEMA, UNSPECIFIED TYPE: ICD-10-CM

## 2024-01-22 DIAGNOSIS — F90.2 ATTENTION DEFICIT HYPERACTIVITY DISORDER (ADHD), COMBINED TYPE: ICD-10-CM

## 2024-01-22 DIAGNOSIS — L70.0 ACNE VULGARIS: ICD-10-CM

## 2024-01-22 DIAGNOSIS — F41.8 SITUATIONAL ANXIETY: ICD-10-CM

## 2024-01-22 DIAGNOSIS — Z00.00 ANNUAL PHYSICAL EXAM: Primary | ICD-10-CM

## 2024-01-22 DIAGNOSIS — E78.5 MILD HYPERLIPIDEMIA: ICD-10-CM

## 2024-01-22 PROCEDURE — 1160F RVW MEDS BY RX/DR IN RCRD: CPT | Mod: CPTII,S$GLB,, | Performed by: NURSE PRACTITIONER

## 2024-01-22 PROCEDURE — 3074F SYST BP LT 130 MM HG: CPT | Mod: CPTII,S$GLB,, | Performed by: NURSE PRACTITIONER

## 2024-01-22 PROCEDURE — 99999 PR PBB SHADOW E&M-EST. PATIENT-LVL IV: CPT | Mod: PBBFAC,,, | Performed by: NURSE PRACTITIONER

## 2024-01-22 PROCEDURE — 99395 PREV VISIT EST AGE 18-39: CPT | Mod: S$GLB,,, | Performed by: NURSE PRACTITIONER

## 2024-01-22 PROCEDURE — 3078F DIAST BP <80 MM HG: CPT | Mod: CPTII,S$GLB,, | Performed by: NURSE PRACTITIONER

## 2024-01-22 PROCEDURE — 3008F BODY MASS INDEX DOCD: CPT | Mod: CPTII,S$GLB,, | Performed by: NURSE PRACTITIONER

## 2024-01-22 PROCEDURE — 1159F MED LIST DOCD IN RCRD: CPT | Mod: CPTII,S$GLB,, | Performed by: NURSE PRACTITIONER

## 2024-01-22 RX ORDER — BUPROPION HYDROCHLORIDE 150 MG/1
150 TABLET ORAL DAILY
Qty: 30 TABLET | Refills: 5 | Status: SHIPPED | OUTPATIENT
Start: 2024-01-22 | End: 2025-01-21

## 2024-01-22 NOTE — PROGRESS NOTES
Subjective:       Patient ID: Elisha Grimes is a 28 y.o. female.    Chief Complaint: Annual Exam    HPI    Patient is a 28 year old white female with ADHD, Acne Vulgaris, Eczema that is treated by Dermatology, and history of chronic GERD that is here today for ANNUAL physical exam with fasting lab results.     Anxiety - Situational  Reports since off of Vyvanse she does find she has noted some situational anxiety present at times  She reports anxious more in group rounding and with rounds with certain providers but functions well in trauma situations and individual rounding.  Also ADHD - she can sometimes feels overwhelmed but stimulants caused significant mood changes  STARTED Wellbutrin  mg daily and reports symptoms have improved.     ADHD  WAS on Vyvanse at 50 mg daily when in school  Patient graduated [employer] with a 4 year degree in Biology and GRADUATED [employer] [facility] program in [place] in May 2021.    She has been OFF of ADHD medication since May 2021 after graduating school.  She now works as a PA in TRAUMA ER at [employer]  She reports that she found the Vyvanse made her irritible and mean.  She reports she manages her ADHD at work mostly due to busy task-oriented schedule. However, she reports she does struggle and have to make lists, etc to get routine activities completed.  She notes that she does have recurring situaltional anixety.  Started Wellbutrin  mg daily and tolerating medication well.     Acne Vulgaris and Eczema   are treated by Dermatologist, Dr. Maria Luz Martinez.  On Dupixent injections  Stable.     Mild Hyperlipidemia  Levels are mildly elevated today   Does have a family history of Hyperlipidemia in mother and brother  Significant jump in levels in past year  Will monitor  Working on dietary modifications  Recheck in 6 months.        Wellness labs:   CBC within normal limits   CMP within normal limits   mild high cholesterol see note   TSH within normal limits      Health maintenance:  Declined COVID booster     Lab Visit on 01/19/2024   Component Date Value Ref Range Status    WBC 01/19/2024 4.14  3.90 - 12.70 K/uL Final    RBC 01/19/2024 4.64  4.00 - 5.40 M/uL Final    Hemoglobin 01/19/2024 13.5  12.0 - 16.0 g/dL Final    Hematocrit 01/19/2024 40.4  37.0 - 48.5 % Final    MCV 01/19/2024 87  82 - 98 fL Final    MCH 01/19/2024 29.1  27.0 - 31.0 pg Final    MCHC 01/19/2024 33.4  32.0 - 36.0 g/dL Final    RDW 01/19/2024 12.0  11.5 - 14.5 % Final    Platelets 01/19/2024 327  150 - 450 K/uL Final    MPV 01/19/2024 10.2  9.2 - 12.9 fL Final    Immature Granulocytes 01/19/2024 0.5  0.0 - 0.5 % Final    Gran # (ANC) 01/19/2024 2.0  1.8 - 7.7 K/uL Final    Immature Grans (Abs) 01/19/2024 0.02  0.00 - 0.04 K/uL Final    Comment: Mild elevation in immature granulocytes is non specific and   can be seen in a variety of conditions including stress response,   acute inflammation, trauma and pregnancy. Correlation with other   laboratory and clinical findings is essential.      Lymph # 01/19/2024 1.5  1.0 - 4.8 K/uL Final    Mono # 01/19/2024 0.4  0.3 - 1.0 K/uL Final    Eos # 01/19/2024 0.1  0.0 - 0.5 K/uL Final    Baso # 01/19/2024 0.04  0.00 - 0.20 K/uL Final    nRBC 01/19/2024 0  0 /100 WBC Final    Gran % 01/19/2024 47.6  38.0 - 73.0 % Final    Lymph % 01/19/2024 37.2  18.0 - 48.0 % Final    Mono % 01/19/2024 10.6  4.0 - 15.0 % Final    Eosinophil % 01/19/2024 3.1  0.0 - 8.0 % Final    Basophil % 01/19/2024 1.0  0.0 - 1.9 % Final    Differential Method 01/19/2024 Automated   Final    Sodium 01/19/2024 143  136 - 145 mmol/L Final    Potassium 01/19/2024 4.2  3.5 - 5.1 mmol/L Final    Chloride 01/19/2024 104  95 - 110 mmol/L Final    CO2 01/19/2024 24  23 - 29 mmol/L Final    Glucose 01/19/2024 88  70 - 110 mg/dL Final    BUN 01/19/2024 15  7 - 17 mg/dL Final    Creatinine 01/19/2024 0.78  0.50 - 1.40 mg/dL Final    Calcium 01/19/2024 9.6  8.7 - 10.5 mg/dL Final    Total Protein  01/19/2024 7.9  6.0 - 8.4 g/dL Final    Albumin 01/19/2024 4.9  3.5 - 5.2 g/dL Final    Total Bilirubin 01/19/2024 0.4  0.1 - 1.0 mg/dL Final    Comment: For infants and newborns, interpretation of results should be based  on gestational age, weight and in agreement with clinical  observations.    Premature Infant recommended reference ranges:  Up to 24 hours.............<8.0 mg/dL  Up to 48 hours............<12.0 mg/dL  3-5 days..................<15.0 mg/dL  6-29 days.................<15.0 mg/dL      Alkaline Phosphatase 01/19/2024 54  38 - 126 U/L Final    AST 01/19/2024 26  15 - 46 U/L Final    ALT 01/19/2024 23  10 - 44 U/L Final    Anion Gap 01/19/2024 15  8 - 16 mmol/L Final    eGFR 01/19/2024 >60.0  >60 mL/min/1.73 m^2 Final    Cholesterol 01/19/2024 218 (H)  120 - 199 mg/dL Final    Comment: The National Cholesterol Education Program (NCEP) has set the  following guidelines (reference ranges) for Cholesterol:  Optimal.....................<200 mg/dL  Borderline High.............200-239 mg/dL  High........................> or = 240 mg/dL      Triglycerides 01/19/2024 54  30 - 150 mg/dL Final    Comment: The National Cholesterol Education Program (NCEP) has set the  following guidelines (reference values) for triglycerides:  Normal......................<150 mg/dL  Borderline High.............150-199 mg/dL  High........................200-499 mg/dL      HDL 01/19/2024 54  40 - 75 mg/dL Final    Comment: The National Cholesterol Education Program (NCEP) has set the  following guidelines (reference values) for HDL Cholesterol:  Low...............<40 mg/dL  Optimal...........>60 mg/dL      LDL Cholesterol 01/19/2024 153.2  63.0 - 159.0 mg/dL Final    Comment: The National Cholesterol Education Program (NCEP) has set the  following guidelines (reference values) for LDL Cholesterol:  Optimal.......................<130 mg/dL  Borderline High...............130-159 mg/dL  High..........................160-189  "mg/dL  Very High.....................>190 mg/dL      HDL/Cholesterol Ratio 01/19/2024 24.8  20.0 - 50.0 % Final    Total Cholesterol/HDL Ratio 01/19/2024 4.0  2.0 - 5.0 Final    Non-HDL Cholesterol 01/19/2024 164  mg/dL Final    Comment: Risk category and Non-HDL cholesterol goals:  Coronary heart disease (CHD)or equivalent (10-year risk of CHD >20%):  Non-HDL cholesterol goal     <130 mg/dL  Two or more CHD risk factors and 10-year risk of CHD <= 20%:  Non-HDL cholesterol goal     <160 mg/dL  0 to 1 CHD risk factor:  Non-HDL cholesterol goal     <190 mg/dL      TSH 01/19/2024 0.876  0.400 - 4.000 uIU/mL Final         Review of Systems   HENT: Negative.     Respiratory: Negative.     Cardiovascular: Negative.    Gastrointestinal: Negative.          Objective:     Vitals:    01/22/24 1107   BP: 106/68   BP Location: Left arm   Patient Position: Sitting   BP Method: Large (Manual)   Pulse: 81   Temp: 98.2 °F (36.8 °C)   TempSrc: Temporal   SpO2: 98%   Weight: 74.4 kg (164 lb 0.4 oz)   Height: 5' 7" (1.702 m)          Physical Exam  Constitutional:       General: She is not in acute distress.     Appearance: Normal appearance. She is well-developed and normal weight. She is not ill-appearing, toxic-appearing or diaphoretic.      Comments: Body mass index is 25.69 kg/m².         HENT:      Head: Normocephalic and atraumatic.      Right Ear: Tympanic membrane, ear canal and external ear normal. There is no impacted cerumen.      Left Ear: Tympanic membrane, ear canal and external ear normal. There is no impacted cerumen.      Nose: Nose normal. No congestion.      Mouth/Throat:      Pharynx: No oropharyngeal exudate.   Eyes:      General: No scleral icterus.        Right eye: No discharge.         Left eye: No discharge.      Extraocular Movements: Extraocular movements intact.      Conjunctiva/sclera: Conjunctivae normal.      Pupils: Pupils are equal, round, and reactive to light.   Neck:      Thyroid: No thyromegaly. "      Trachea: No tracheal deviation.   Cardiovascular:      Rate and Rhythm: Normal rate and regular rhythm.      Heart sounds: Normal heart sounds. No murmur heard.  Pulmonary:      Effort: Pulmonary effort is normal. No respiratory distress.      Breath sounds: Normal breath sounds. No stridor. No wheezing, rhonchi or rales.   Abdominal:      General: There is no distension.      Palpations: There is no mass.      Hernia: No hernia is present.   Musculoskeletal:         General: No swelling. Normal range of motion.      Cervical back: Normal range of motion and neck supple.      Right lower leg: No edema.      Left lower leg: No edema.   Lymphadenopathy:      Cervical: No cervical adenopathy.   Skin:     General: Skin is warm and dry.   Neurological:      Mental Status: She is alert and oriented to person, place, and time.      Cranial Nerves: No cranial nerve deficit.      Coordination: Coordination normal.   Psychiatric:         Mood and Affect: Mood normal.         Behavior: Behavior normal.         Thought Content: Thought content normal.         Judgment: Judgment normal.           Assessment:         ICD-10-CM ICD-9-CM   1. Annual physical exam  Z00.00 V70.0   2. Mild hyperlipidemia  E78.5 272.4   3. Attention deficit hyperactivity disorder (ADHD), combined type  F90.2 314.01   4. Situational anxiety  F41.8 300.09   5. Eczema, unspecified type  L30.9 692.9   6. Acne vulgaris  L70.0 706.1       Plan:       1. Annual physical exam     Health Maintenance Summary   Full History      Expand All  Collapse All  Postponed - COVID-19 Vaccine   (3 - 2023-24 season)Postponed until 1/22/2025 01/27/2021  Imm Admin: COVID-19, MRNA, LN-S, PF (Pfizer) (Purple Cap)   01/08/2021  Imm Admin: COVID-19, MRNA, LN-S, PF (Pfizer) (Purple Cap)   Postponed - Pneumococcal Vaccines (Age 0-64)   (1 - PCV)Postponed until 12/4/2028  No completion history exists for this topic.   Pap Smear   (Every 3 Years)Next due on  4/17/2026 04/17/2023  Outside Claim: CHG CYTOPAT,CER/VAG,THIN LAYER,MAN RES,INTER   03/08/2022  Outside Claim: CHG CYTOPAT,CER/VAG,THIN LAYER,MAN RES,INTER   03/16/2019  [facility] PAP SMEAR   01/05/2017  Done - Dr. Giles   TETANUS VACCINE   (Every 10 Years)Next due on 10/25/2028  10/25/2018  Imm Admin: Tdap   05/03/2007  Imm Admin: Tdap   Hepatitis C Screening  Completed  04/20/2021  Hepatitis C Ab component of Hepatitis C Antibody   HIV Screening  Completed  04/20/2021  HIV 1/2 Ag/Ab (4th Gen)   Influenza Vaccine   (Series Information)Completed  11/21/2023  Imm Admin: Influenza - Quadrivalent - PF *Preferred* (6 months and older)   10/19/2022  Imm Admin: Influenza - Quadrivalent   10/19/2022  Imm Admin: Influenza - Quadrivalent - PF *Preferred* (6 months and older)   10/22/2021  Imm Admin: Influenza - Quadrivalent   10/22/2021  Imm Admin: Influenza - Quadrivalent - PF *Preferred* (6 months and older)   View More History   Lipid Panel  Completed  01/19/2024  Cholesterol Total component of Lipid Panel   11/23/2022  Cholesterol Total component of Lipid Panel   04/20/2021  Cholesterol Total component of Lipid Panel   07/31/2018  Cholesterol Total component of Lipid panel     2. Mild hyperlipidemia  Levels are mildly elevated today   Does have a family history of Hyperlipidemia in mother and brother  Significant jump in levels in past year  Will monitor  Working on dietary modifications  Recheck in 6 months.        3. Attention deficit hyperactivity disorder (ADHD), combined type  Overview:  WAS on Vyvanse at 50 mg daily when in school  Patient graduated [employer] with a 4 year degree in Biology and GRADUATED [employer] [facility] program in [place] in May 2021.    She has been OFF of ADHD medication since May 2021 after graduating school.  She now works as a PA in TRAUMA ER at [employer]  She reports that she found the Vyvanse made her irritible and mean.  She reports she manages her ADHD at work mostly due to busy  task-oriented schedule. However, she reports she does struggle and have to make lists, etc to get routine activities completed.  She notes that she does have recurring situaltional anixety.  Started Wellbutrin  mg daily and tolerating medication well.    Orders:  -     buPROPion (WELLBUTRIN XL) 150 MG TB24 tablet; Take 1 tablet (150 mg total) by mouth once daily.  Dispense: 30 tablet; Refill: 5    4. Situational anxiety  Overview:  Reports since off of Vyvanse she does find she has noted some situational anxiety present at times  She reports anxious more in group rounding and with rounds with certain providers but functions well in trauma situations and individual rounding.  Also ADHD - she can sometimes feels overwhelmed but stimulants caused significant mood changes  STARTED Wellbutrin  mg daily and reports symptoms have improved.    Orders:  -     buPROPion (WELLBUTRIN XL) 150 MG TB24 tablet; Take 1 tablet (150 mg total) by mouth once daily.  Dispense: 30 tablet; Refill: 5    5. Eczema, unspecified type  Overview:  are treated by Dermatologist, Dr. Maria Luz Martinez.  On Dupixent injections  Stable.         6. Acne vulgaris  Overview:  are treated by Dermatologist, Dr. Maria Luz Martinez.  On Dupixent injections  Stable.            Follow up in about 6 months (around 7/22/2024) for med check.     Patient's Medications   New Prescriptions    No medications on file   Previous Medications    ACZONE 7.5 % GLWP    SPOT TREAT FACE EVERY MORNING    DICYCLOMINE (BENTYL) 10 MG CAPSULE    Take 1 capsule (10 mg total) by mouth 3 (three) times daily as needed (abdominal pain).    DUPIXENT  MG/2 ML PNIJ        HYDROCORTISONE 2.5 % OINTMENT    Apply 1 application twice daily up to 2 weeks on face and neck.    SPIRONOLACTONE (ALDACTONE) 50 MG TABLET    Take 1 tablet (50 mg total) by mouth 2 (two) times daily.    TACROLIMUS (PROTOPIC) 0.1 % OINTMENT    1 application 2 (two) times daily.    TRIAMCINOLONE ACETONIDE 0.1%  (KENALOG) 0.1 % CREAM    Apply to areas of eczema on body twice a day    VALACYCLOVIR (VALTREX) 1000 MG TABLET    Take 2 tablets (2,000 mg total) by mouth 2 (two) times daily as needed (cold sore outbreak).   Modified Medications    Modified Medication Previous Medication    BUPROPION (WELLBUTRIN XL) 150 MG TB24 TABLET buPROPion (WELLBUTRIN XL) 150 MG TB24 tablet       Take 1 tablet (150 mg total) by mouth once daily.    Take 1 tablet (150 mg total) by mouth once daily.   Discontinued Medications    CLOBETASOL 0.05% (TEMOVATE) 0.05 % OINT    Apply to the affected area twice daily for 2 weeks out of month or 2 days out of the week.  Avoid face.    NORETHINDRONE-E.ESTRADIOL-IRON (LO LOESTRIN FE) 1 MG-10 MCG (24)/10 MCG (2) TAB    Take 1 tablet by mouth once daily       Past Medical History:   Diagnosis Date    Acne vulgaris 08/06/2018    TREATED BY CANDI QUINTEROS    Attention deficit hyperactivity disorder (ADHD), combined type     Eczema        Past Surgical History:   Procedure Laterality Date    ESOPHAGOGASTRODUODENOSCOPY N/A 5/25/2021    Procedure: EGD (ESOPHAGOGASTRODUODENOSCOPY);  Surgeon: Carol Castro MD;  Location: [facility];  Service: Endoscopy;  Laterality: N/A;       Family History   Problem Relation Age of Onset    No Known Problems Mother     Heart disease Father         passed age 63 Acute MI; had prior cardiac arrest events prior -     Hypertension Father     Hyperlipidemia Father     No Known Problems Brother     Emphysema Maternal Grandmother        Social History     Socioeconomic History    Marital status: Single   Occupational History    Occupation: student   Tobacco Use    Smoking status: Never     Passive exposure: Never    Smokeless tobacco: Never   Substance and Sexual Activity    Alcohol use: Yes     Comment: once monthly on average; vodka and water usually 2 drinks on a night she drinks    Drug use: No    Sexual activity: Yes     Partners: Male   Social History Narrative     [employer] - major Biology.      NOW PA - TRAUMA PA     Social Determinants of Health     Financial Resource Strain: Low Risk  (10/24/2022)    Overall Financial Resource Strain (CARDIA)     Difficulty of Paying Living Expenses: Not hard at all   Food Insecurity: No Food Insecurity (10/24/2022)    Hunger Vital Sign     Worried About Running Out of Food in the Last Year: Never true     Ran Out of Food in the Last Year: Never true   Transportation Needs: No Transportation Needs (10/24/2022)    PRAPARE - Transportation     Lack of Transportation (Medical): No     Lack of Transportation (Non-Medical): No   Physical Activity: Sufficiently Active (10/24/2022)    Exercise Vital Sign     Days of Exercise per Week: 4 days     Minutes of Exercise per Session: 40 min   Stress: Stress Concern Present (10/24/2022)    [place] [employer] - Occupational Stress Questionnaire     Feeling of Stress : To some extent   Social Connections: Unknown (10/24/2022)    Social Connection and Isolation Panel [NHANES]     Frequency of Communication with Friends and Family: More than three times a week     Frequency of Social Gatherings with Friends and Family: More than three times a week     Active Member of Clubs or Organizations: No     Attends Club or Organization Meetings: Patient declined     Marital Status: Living with partner   Housing Stability: Low Risk  (10/24/2022)    Housing Stability Vital Sign     Unable to Pay for Housing in the Last Year: No     Number of Places Lived in the Last Year: 1     Unstable Housing in the Last Year: No

## 2024-02-08 NOTE — PROCEDURE: COUNSELING
Detail Level: Detailed [Initial Evaluation] : an initial evaluation for [Hearing Loss] : hearing loss

## 2024-04-12 ENCOUNTER — E-VISIT (OUTPATIENT)
Dept: FAMILY MEDICINE | Facility: CLINIC | Age: 29
End: 2024-04-12
Payer: COMMERCIAL

## 2024-04-12 DIAGNOSIS — B37.31 VAGINAL YEAST INFECTION: Primary | ICD-10-CM

## 2024-04-12 PROCEDURE — 99421 OL DIG E/M SVC 5-10 MIN: CPT | Mod: ,,, | Performed by: NURSE PRACTITIONER

## 2024-04-12 RX ORDER — FLUCONAZOLE 150 MG/1
150 TABLET ORAL DAILY
Qty: 1 TABLET | Refills: 0 | Status: SHIPPED | OUTPATIENT
Start: 2024-04-12 | End: 2024-04-13

## 2024-07-08 RX ORDER — VALACYCLOVIR HYDROCHLORIDE 1 G/1
2000 TABLET, FILM COATED ORAL 2 TIMES DAILY PRN
Qty: 4 TABLET | Refills: 2 | Status: SHIPPED | OUTPATIENT
Start: 2024-07-08

## 2024-07-17 ENCOUNTER — OFFICE VISIT (OUTPATIENT)
Dept: FAMILY MEDICINE | Facility: CLINIC | Age: 29
End: 2024-07-17
Payer: COMMERCIAL

## 2024-07-17 ENCOUNTER — TELEPHONE (OUTPATIENT)
Dept: FAMILY MEDICINE | Facility: CLINIC | Age: 29
End: 2024-07-17

## 2024-07-17 VITALS
SYSTOLIC BLOOD PRESSURE: 98 MMHG | OXYGEN SATURATION: 98 % | WEIGHT: 158.38 LBS | DIASTOLIC BLOOD PRESSURE: 64 MMHG | HEIGHT: 67 IN | HEART RATE: 70 BPM | TEMPERATURE: 98 F | BODY MASS INDEX: 24.86 KG/M2

## 2024-07-17 DIAGNOSIS — Z13.29 THYROID DISORDER SCREEN: ICD-10-CM

## 2024-07-17 DIAGNOSIS — Z13.0 SCREENING FOR DEFICIENCY ANEMIA: ICD-10-CM

## 2024-07-17 DIAGNOSIS — L70.0 ACNE VULGARIS: ICD-10-CM

## 2024-07-17 DIAGNOSIS — L30.9 ECZEMA, UNSPECIFIED TYPE: ICD-10-CM

## 2024-07-17 DIAGNOSIS — Z13.1 DIABETES MELLITUS SCREENING: ICD-10-CM

## 2024-07-17 DIAGNOSIS — E78.5 MILD HYPERLIPIDEMIA: ICD-10-CM

## 2024-07-17 DIAGNOSIS — Z00.00 ENCOUNTER FOR BLOOD TEST FOR ROUTINE GENERAL PHYSICAL EXAMINATION: ICD-10-CM

## 2024-07-17 DIAGNOSIS — F41.8 SITUATIONAL ANXIETY: Primary | ICD-10-CM

## 2024-07-17 DIAGNOSIS — F90.2 ATTENTION DEFICIT HYPERACTIVITY DISORDER (ADHD), COMBINED TYPE: ICD-10-CM

## 2024-07-17 PROCEDURE — 3074F SYST BP LT 130 MM HG: CPT | Mod: CPTII,S$GLB,, | Performed by: NURSE PRACTITIONER

## 2024-07-17 PROCEDURE — 1159F MED LIST DOCD IN RCRD: CPT | Mod: CPTII,S$GLB,, | Performed by: NURSE PRACTITIONER

## 2024-07-17 PROCEDURE — 3078F DIAST BP <80 MM HG: CPT | Mod: CPTII,S$GLB,, | Performed by: NURSE PRACTITIONER

## 2024-07-17 PROCEDURE — 3008F BODY MASS INDEX DOCD: CPT | Mod: CPTII,S$GLB,, | Performed by: NURSE PRACTITIONER

## 2024-07-17 PROCEDURE — 99999 PR PBB SHADOW E&M-EST. PATIENT-LVL IV: CPT | Mod: PBBFAC,,, | Performed by: NURSE PRACTITIONER

## 2024-07-17 PROCEDURE — 99214 OFFICE O/P EST MOD 30 MIN: CPT | Mod: S$GLB,,, | Performed by: NURSE PRACTITIONER

## 2024-07-17 PROCEDURE — 1160F RVW MEDS BY RX/DR IN RCRD: CPT | Mod: CPTII,S$GLB,, | Performed by: NURSE PRACTITIONER

## 2024-07-17 NOTE — PROGRESS NOTES
Subjective:       Patient ID: Elisha Grimes is a 28 y.o. female.    Chief Complaint: Follow-up (6 months Med Check)    Follow-up        Patient is a 28 year old white female with ADHD, Acne Vulgaris, Eczema that is treated by Dermatology, and history of chronic GERD that is here today for 6 month with fasting lab results.     Anxiety - Situational  Reports since off of Vyvanse she does find she has noted some situational anxiety present at times  She reports anxious more in group rounding and with rounds with certain providers but functions well in trauma situations and individual rounding.  Also ADHD - she can sometimes feels overwhelmed but stimulants caused significant mood changes  STARTED Wellbutrin  mg daily 10/31/2022 and reports symptoms have improved.  She reports she is working out in gym and doing well - decreased stressors  Would like to trial getting off of medication and manage with behavioral modification techniques  She will message me if trial wean off medication is ineffective and we can start back on medication  Recheck in 6 months.     ADHD  WAS on Vyvanse at 50 mg daily when in school  Patient graduated [employer] with a 4 year degree in Biology and GRADUATED [employer] PA program in [place] in May 2021.    She has been OFF of ADHD medication since May 2021 after graduating school.  She now works as a PA in TRAUMA ER at [employer]  She reports that she found the Vyvanse made her irritible and mean.  She reports she manages her ADHD at work mostly due to busy task-oriented schedule. However, she reports she does struggle and have to make lists, etc to get routine activities completed.  She notes that she does have recurring situaltional anixety.  Started Wellbutrin  mg daily in October 2022 and tolerating medication well.  She reports she is working out in gym and doing well - decreased stressors  Would like to trial getting off of medication and manage with behavioral  "modification techniques  She will message me if trial wean off medication is ineffective and we can start back on medication  Recheck in 6 months.       Acne Vulgaris and Eczema   are treated by Dermatologist, Dr. Maria Luz Martinez.  On Dupixent injections  Stable.     Mild Hyperlipidemia  Levels are mildly elevated today   Does have a family history of Hyperlipidemia in mother and brother  Significant jump in levels in past year  Will monitor  Working on dietary modifications  Will recheck in January 2025          Vitals:    07/17/24 0825   BP: 98/64   BP Location: Left arm   Patient Position: Sitting   BP Method: Large (Manual)   Pulse: 70   Temp: 97.9 °F (36.6 °C)   TempSrc: Temporal   SpO2: 98%   Weight: 71.9 kg (158 lb 6.4 oz)   Height: 5' 7" (1.702 m)       Assessment:         ICD-10-CM ICD-9-CM   1. Situational anxiety  F41.8 300.09   2. Attention deficit hyperactivity disorder (ADHD), combined type  F90.2 314.01   3. Acne vulgaris  L70.0 706.1   4. Eczema, unspecified type  L30.9 692.9   5. Mild hyperlipidemia  E78.5 272.4       Plan:       1. Situational anxiety  Overview:  Reports since off of Vyvanse she does find she has noted some situational anxiety present at times  She reports anxious more in group rounding and with rounds with certain providers but functions well in trauma situations and individual rounding.  Also ADHD - she can sometimes feels overwhelmed but stimulants caused significant mood changes  STARTED Wellbutrin  mg daily 10/31/2022 and reports symptoms have improved.  She reports she is working out in gym and doing well - decreased stressors  Would like to trial getting off of medication and manage with behavioral modification techniques  She will message me if trial wean off medication is ineffective and we can start back on medication  Recheck in 6 months.      2. Attention deficit hyperactivity disorder (ADHD), combined type  Overview:  WAS on Vyvanse at 50 mg daily when in " school  Patient graduated [employer] with a 4 year degree in Biology and GRADUATED [employer] program in [place] in May 2021.    She has been OFF of ADHD medication since May 2021 after graduating school.  She now works as a PA in TRAUMA ER at [employer]  She reports that she found the Vyvanse made her irritible and mean.  She reports she manages her ADHD at work mostly due to busy task-oriented schedule. However, she reports she does struggle and have to make lists, etc to get routine activities completed.  She notes that she does have recurring situaltional anixety.  Started Wellbutrin  mg daily in October 2022 and tolerating medication well.  She reports she is working out in gym and doing well - decreased stressors  Would like to trial getting off of medication and manage with behavioral modification techniques  She will message me if trial wean off medication is ineffective and we can start back on medication  Recheck in 6 months.      3. Acne vulgaris  Overview:  Acne Vulgaris and Eczema   are treated by Dermatologist, Dr. Maria Luz Martinez.  On Dupixent injections  Stable.         4. Eczema, unspecified type  Overview:  Acne Vulgaris and Eczema   are treated by Dermatologist, Dr. Maria Luz Martinez.  On Dupixent injections  Stable.         5. Mild hyperlipidemia  Overview:  Levels are mildly elevated today   Does have a family history of Hyperlipidemia in mother and brother  Significant jump in levels in past year  Will monitor  Working on dietary modifications  Recheck in 6 months.           Follow up in about 6 months (around 1/17/2025) for fasting labs and WELLNESS EXAM.     Patient's Medications   New Prescriptions    No medications on file   Previous Medications    ACZONE 7.5 % GLWP    SPOT TREAT FACE EVERY MORNING    DICYCLOMINE (BENTYL) 10 MG CAPSULE    Take 1 capsule (10 mg total) by mouth 3 (three) times daily as needed (abdominal pain).    DUPIXENT  MG/2 ML PNIJ        HYDROCORTISONE 2.5  % OINTMENT    Apply 1 application twice daily up to 2 weeks on face and neck.    TACROLIMUS (PROTOPIC) 0.1 % OINTMENT    1 application 2 (two) times daily.    TRIAMCINOLONE ACETONIDE 0.1% (KENALOG) 0.1 % CREAM    Apply to areas of eczema on body twice a day    VALACYCLOVIR (VALTREX) 1000 MG TABLET    Take 2 tablets (2,000 mg total) by mouth 2 (two) times daily as needed (cold sore outbreak).   Modified Medications    No medications on file   Discontinued Medications    BUPROPION (WELLBUTRIN XL) 150 MG TB24 TABLET    Take 1 tablet (150 mg total) by mouth once daily.    SPIRONOLACTONE (ALDACTONE) 50 MG TABLET    Take 1 tablet (50 mg total) by mouth 2 (two) times daily.         Review of Systems   HENT: Negative.     Respiratory: Negative.     Cardiovascular: Negative.    Gastrointestinal: Negative.    Psychiatric/Behavioral:  Negative for decreased concentration and dysphoric mood. The patient is not nervous/anxious.          Objective:        Physical Exam  Constitutional:       General: She is not in acute distress.     Appearance: Normal appearance. She is normal weight. She is not toxic-appearing or diaphoretic.      Comments: Body mass index is 24.81 kg/m².     Cardiovascular:      Rate and Rhythm: Normal rate and regular rhythm.      Heart sounds: Normal heart sounds. No murmur heard.  Pulmonary:      Effort: Pulmonary effort is normal. No respiratory distress.   Neurological:      Mental Status: She is alert and oriented to person, place, and time.   Psychiatric:         Mood and Affect: Mood normal.         Behavior: Behavior normal.             Past Medical History:   Diagnosis Date    Acne vulgaris 08/06/2018    TREATED BY CANDI QUINTEROS    Attention deficit hyperactivity disorder (ADHD), combined type     Eczema        Past Surgical History:   Procedure Laterality Date    ESOPHAGOGASTRODUODENOSCOPY N/A 5/25/2021    Procedure: EGD (ESOPHAGOGASTRODUODENOSCOPY);  Surgeon: Carol Castro MD;  Location: [facility]  ENDO;  Service: Endoscopy;  Laterality: N/A;       Family History   Problem Relation Name Age of Onset    No Known Problems Mother      Heart disease Father Toni Grimes         passed age 63 Acute MI; had prior cardiac arrest events prior -     Hypertension Father Toni Grimes     Hyperlipidemia Father Toni Grimes     No Known Problems Brother      Emphysema Maternal Grandmother         Social History     Socioeconomic History    Marital status:    Occupational History    Occupation: student   Tobacco Use    Smoking status: Never     Passive exposure: Never    Smokeless tobacco: Never   Substance and Sexual Activity    Alcohol use: Yes     Comment: once monthly on average; vodka and water usually 2 drinks on a night she drinks    Drug use: No    Sexual activity: Yes     Partners: Male   Social History Narrative    [employer] - major Biology.      NOW PA - TRAUMA PA     Social Determinants of Health     Financial Resource Strain: Low Risk  (10/24/2022)    Overall Financial Resource Strain (CARDIA)     Difficulty of Paying Living Expenses: Not hard at all   Food Insecurity: No Food Insecurity (10/24/2022)    Hunger Vital Sign     Worried About Running Out of Food in the Last Year: Never true     Ran Out of Food in the Last Year: Never true   Transportation Needs: No Transportation Needs (10/24/2022)    PRAPARE - Transportation     Lack of Transportation (Medical): No     Lack of Transportation (Non-Medical): No   Physical Activity: Sufficiently Active (10/24/2022)    Exercise Vital Sign     Days of Exercise per Week: 4 days     Minutes of Exercise per Session: 40 min   Stress: Stress Concern Present (10/24/2022)    [place] [employer] - Occupational Stress Questionnaire     Feeling of Stress : To some extent   Housing Stability: Low Risk  (10/24/2022)    Housing Stability Vital Sign     Unable to Pay for Housing in the Last Year: No     Number of Places Lived in the Last Year:  1     Unstable Housing in the Last Year: No

## 2024-10-05 ENCOUNTER — PATIENT MESSAGE (OUTPATIENT)
Dept: FAMILY MEDICINE | Facility: CLINIC | Age: 29
End: 2024-10-05
Payer: COMMERCIAL

## 2024-10-05 DIAGNOSIS — F41.8 SITUATIONAL ANXIETY: ICD-10-CM

## 2024-10-05 DIAGNOSIS — F90.2 ATTENTION DEFICIT HYPERACTIVITY DISORDER (ADHD), COMBINED TYPE: ICD-10-CM

## 2024-10-07 RX ORDER — BUPROPION HYDROCHLORIDE 150 MG/1
150 TABLET ORAL DAILY
Qty: 30 TABLET | Refills: 5 | Status: SHIPPED | OUTPATIENT
Start: 2024-10-07 | End: 2025-10-07

## 2024-12-17 RX ORDER — VALACYCLOVIR HYDROCHLORIDE 1 G/1
2000 TABLET, FILM COATED ORAL 2 TIMES DAILY PRN
Qty: 4 TABLET | Refills: 2 | Status: SHIPPED | OUTPATIENT
Start: 2024-12-17

## 2025-01-08 ENCOUNTER — E-VISIT (OUTPATIENT)
Dept: FAMILY MEDICINE | Facility: CLINIC | Age: 30
End: 2025-01-08
Payer: COMMERCIAL

## 2025-01-08 DIAGNOSIS — B37.31 VAGINAL YEAST INFECTION: Primary | ICD-10-CM

## 2025-01-08 RX ORDER — FLUCONAZOLE 150 MG/1
150 TABLET ORAL DAILY
Qty: 1 TABLET | Refills: 0 | Status: SHIPPED | OUTPATIENT
Start: 2025-01-08 | End: 2025-01-09

## 2025-01-09 NOTE — PROGRESS NOTES
Patient ID: Elisha Grimes is a 29 y.o. female.    Chief Complaint: General Illness (Entered automatically based on patient selection in [facility].)    The patient initiated a request through [facility] on 1/8/2025 for evaluation and management with a chief complaint of General Illness (Entered automatically based on patient selection in [facility].)     I evaluated the questionnaire submission on 1/8/2025 at 740 pm.    [facility] Evisit [facility]group-Common Problems    1/8/2025  3:36 PM CST - Filed by Patient   What do you need help with? Other Concern   Do you agree to participate in an E-Visit? Yes   If you have any of the following symptoms, please present to your local emergency room or call 911:  I acknowledge   Do you have any of the following pregnancy-related conditions? None   What is the main issue you would like addressed today? Yeast infection   Please describe your symptoms Vaginal discharge, irritation, itchiness, recent antibiotic use   Where is your problem located? Perineum   How severe are your symptoms? Mild   Have you had these symptoms before? Yes   How long have you been having these symptoms? For a few days   Please list any medications or treatments you have used for your condition and indicate if it was effective or not. None   What makes this feel better? Diflucan   What makes this feel worse? N/a   Are these symptoms related to a condition that you currently have? No   Please describe any probable cause for these symptoms Recent antibiotic use   Provide any additional information you feel is important.    Please attach any relevant images or files    Are you able to take your vital signs? Yes   Systolic Blood Pressure: 118   Diastolic Blood Pressure: 76   Weight: 155   Height: 67   Pulse: 61   Temperature: 98.3   Respiration rate:    Pulse Oxygen:          Encounter Diagnosis   Name Primary?    Vaginal yeast infection Yes        No orders of the defined types were placed in this encounter.      Medications Ordered This Encounter   Medications    fluconazole (DIFLUCAN) 150 MG Tab     Sig: Take 1 tablet (150 mg total) by mouth once daily. for 1 day     Dispense:  1 tablet     Refill:  0        Follow up if symptoms worsen or fail to improve.      E-Visit Time Tracking:    Day 1 Time (in minutes): 6    Total Time (in minutes): 6

## 2025-01-28 RX ORDER — VALACYCLOVIR HYDROCHLORIDE 1 G/1
2000 TABLET, FILM COATED ORAL 2 TIMES DAILY PRN
Qty: 30 TABLET | Refills: 0 | Status: SHIPPED | OUTPATIENT
Start: 2025-01-28

## 2025-03-19 NOTE — PROGRESS NOTES
Subjective:       Patient ID: Elisha Grimes is a 24 y.o. female.    Chief Complaint: ADHD  The patient location is: Home in [place], [place]  The chief complaint leading to consultation is: ADHD follow up  Visit type: Virtual visit with synchronous audio and video  Total time spent with patient: 15  Each patient to whom he or she provides medical services by telemedicine is:  (1) informed of the relationship between the physician and patient and the respective role of any other health care provider with respect to management of the patient; and (2) notified that he or she may decline to receive medical services by telemedicine and may withdraw from such care at any time.    Notes:     Patient is a 24 year old white female with ADHD, Acne Vulgaris and Eczema that is treated by Dermatology that has virtual today for ADHD follow up.     Patient has ADHD that is  controlled on Vyvanse at 60 mg daily.  Patient graduated [employer] with a 4 year degree in Biology and and is now going to school at [employer] in [place]. Reports medication is working well without side effects.     The Acne Vulgaris and Eczema are treated by Dermatologist, Dr. Maria Luz Martinez.      Current Outpatient Medications   Medication Sig Dispense Refill    ACZONE 7.5 % GlwP SPOT TREAT FACE EVERY MORNING  3    flurandrenolide (CORDRAN) 0.05 % lotion Apply topically.      hydrocortisone 2.5 % ointment Apply 1 application twice daily up to 2 weeks on face and neck. 28.35 g 0    hydrOXYzine HCl (ATARAX) 25 MG tablet Take 1 to 2 tablets (25 mg total) by mouth nightly. 60 tablet 0    lisdexamfetamine (VYVANSE) 60 MG capsule Take 1 capsule (60 mg total) by mouth every morning. 30 capsule 0    naproxen (NAPROSYN) 375 MG tablet Take 375 mg by mouth.      norethindrone-e.estradiol-iron (LO LOESTRIN FE) 1 mg-10 mcg (24)/10 mcg (2) Tab Take 1 tablet by mouth once daily 84 tablet 3    spironolactone (ALDACTONE) 50 MG tablet  Take 1 tablet (50 mg total) by mouth 2 (two) times daily. 60 tablet 11    tacrolimus (PROTOPIC) 0.1 % ointment 1 application 2 (two) times daily.  6    triamcinolone acetonide 0.1% (KENALOG) 0.1 % cream Apply to areas of eczema on body twice a day      valACYclovir (VALTREX) 1000 MG tablet Take 2 tablets (2,000 mg total) by mouth 2 (two) times daily. 4 tablet 0     No current facility-administered medications for this visit.        Past Medical History:   Diagnosis Date    Acne vulgaris 08/06/2018    TREATED BY CANDI QUINTEROS    Attention deficit hyperactivity disorder (ADHD), combined type     Eczema        History reviewed. No pertinent surgical history.    Family History   Problem Relation Age of Onset    No Known Problems Mother     Heart disease Father         passed age 63 Acute MI; had prior cardiac arrest events prior -     Hypertension Father     Hyperlipidemia Father     No Known Problems Brother     Emphysema Maternal Grandmother        Social History     Socioeconomic History    Marital status: Single     Spouse name: Not on file    Number of children: Not on file    Years of education: Not on file    Highest education level: Not on file   Occupational History    Occupation: student   Social Needs    Financial resource strain: Not on file    Food insecurity:     Worry: Never true     Inability: Never true    Transportation needs:     Medical: No     Non-medical: No   Tobacco Use    Smoking status: Never Smoker    Smokeless tobacco: Never Used   Substance and Sexual Activity    Alcohol use: Yes     Frequency: Monthly or less     Drinks per session: 3 or 4     Binge frequency: Never     Comment: once monthly on average; vodka and water usually 2 drinks on a night she drinks    Drug use: No    Sexual activity: Yes     Partners: Male     Birth control/protection: OCP   Lifestyle    Physical activity:     Days per week: 3 days     Minutes per session: 90 min    Stress: Not at all    Relationships    Social connections:     Talks on phone: More than three times a week     Gets together: More than three times a week     Attends Confucianist service: Not on file     Active member of club or organization: No     Attends meetings of clubs or organizations: Patient refused     Relationship status: Never    Other Topics Concern    Not on file   Social History Narrative    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       Review of Systems   Constitutional: Negative for appetite change, chills, fatigue, fever and unexpected weight change.   HENT: Negative for congestion, ear pain, mouth sores, nosebleeds, postnasal drip, rhinorrhea, sinus pressure, sneezing, sore throat, trouble swallowing and voice change.    Eyes: Negative for photophobia, pain, discharge, redness, itching and visual disturbance.   Respiratory: Negative for cough, chest tightness and shortness of breath.    Cardiovascular: Negative for chest pain, palpitations and leg swelling.   Gastrointestinal: Negative for abdominal pain, blood in stool, constipation, diarrhea, nausea and vomiting.   Genitourinary: Negative for dysuria, frequency, hematuria and urgency.   Musculoskeletal: Negative for arthralgias, back pain, joint swelling and myalgias.   Skin: Negative for color change and rash.   Allergic/Immunologic: Negative for immunocompromised state.   Neurological: Negative for dizziness, seizures, syncope, weakness and headaches.   Hematological: Negative for adenopathy. Does not bruise/bleed easily.   Psychiatric/Behavioral: Negative for agitation, dysphoric mood, sleep disturbance and suicidal ideas. The patient is not nervous/anxious.          Objective:     Vitals:    04/08/20 1131   Pulse: 72          Physical Exam   Constitutional: She is oriented to person, place, and time. She appears well-developed and well-nourished. No distress.   HENT:   Head: Normocephalic and atraumatic.   Eyes: Pupils are  equal, round, and reactive to light. Conjunctivae and EOM are normal. Right eye exhibits no discharge. Left eye exhibits no discharge. No scleral icterus.   Pulmonary/Chest: Effort normal. No respiratory distress.   Neurological: She is alert and oriented to person, place, and time.   Skin: She is not diaphoretic.   Psychiatric: She has a normal mood and affect. Her behavior is normal. Judgment and thought content normal.         Assessment:         ICD-10-CM ICD-9-CM   1. Attention deficit hyperactivity disorder (ADHD), combined type F90.2 314.01       Plan:       Attention deficit hyperactivity disorder (ADHD), combined type  -  Controlled on present medication and dose.  Will send refill request when needed.  Recheck in 3 months.      Follow up in about 3 months (around 7/8/2020) for ADHD follow up.     Patient's Medications   New Prescriptions    No medications on file   Previous Medications    ACZONE 7.5 % GLWP    SPOT TREAT FACE EVERY MORNING    FLURANDRENOLIDE (CORDRAN) 0.05 % LOTION    Apply topically.    HYDROCORTISONE 2.5 % OINTMENT    Apply 1 application twice daily up to 2 weeks on face and neck.    HYDROXYZINE HCL (ATARAX) 25 MG TABLET    Take 1 to 2 tablets (25 mg total) by mouth nightly.    LISDEXAMFETAMINE (VYVANSE) 60 MG CAPSULE    Take 1 capsule (60 mg total) by mouth every morning.    NAPROXEN (NAPROSYN) 375 MG TABLET    Take 375 mg by mouth.    NORETHINDRONE-E.ESTRADIOL-IRON (LO LOESTRIN FE) 1 MG-10 MCG (24)/10 MCG (2) TAB    Take 1 tablet by mouth once daily    SPIRONOLACTONE (ALDACTONE) 50 MG TABLET    Take 1 tablet (50 mg total) by mouth 2 (two) times daily.    TACROLIMUS (PROTOPIC) 0.1 % OINTMENT    1 application 2 (two) times daily.    TRIAMCINOLONE ACETONIDE 0.1% (KENALOG) 0.1 % CREAM    Apply to areas of eczema on body twice a day    VALACYCLOVIR (VALTREX) 1000 MG TABLET    Take 2 tablets (2,000 mg total) by mouth 2 (two) times daily.   Modified Medications    No medications on file    Discontinued Medications    No medications on file      No